# Patient Record
Sex: MALE | Race: WHITE | Employment: OTHER | URBAN - METROPOLITAN AREA
[De-identification: names, ages, dates, MRNs, and addresses within clinical notes are randomized per-mention and may not be internally consistent; named-entity substitution may affect disease eponyms.]

---

## 2024-01-15 ENCOUNTER — TRANSITIONAL CARE UNIT VISIT (OUTPATIENT)
Dept: GERIATRICS | Facility: CLINIC | Age: 74
End: 2024-01-15
Payer: COMMERCIAL

## 2024-01-15 ENCOUNTER — LAB REQUISITION (OUTPATIENT)
Dept: LAB | Facility: CLINIC | Age: 74
End: 2024-01-15
Payer: COMMERCIAL

## 2024-01-15 ENCOUNTER — DOCUMENTATION ONLY (OUTPATIENT)
Dept: GERIATRICS | Facility: CLINIC | Age: 74
End: 2024-01-15

## 2024-01-15 VITALS
HEART RATE: 81 BPM | BODY MASS INDEX: 24.74 KG/M2 | DIASTOLIC BLOOD PRESSURE: 79 MMHG | RESPIRATION RATE: 20 BRPM | OXYGEN SATURATION: 100 % | WEIGHT: 172.8 LBS | HEIGHT: 70 IN | TEMPERATURE: 97.6 F | SYSTOLIC BLOOD PRESSURE: 121 MMHG

## 2024-01-15 DIAGNOSIS — N18.32 STAGE 3B CHRONIC KIDNEY DISEASE (H): ICD-10-CM

## 2024-01-15 DIAGNOSIS — E11.00 TYPE 2 DIABETES MELLITUS WITH HYPEROSMOLARITY WITHOUT COMA, UNSPECIFIED WHETHER LONG TERM INSULIN USE (H): ICD-10-CM

## 2024-01-15 DIAGNOSIS — N18.32 CHRONIC KIDNEY DISEASE, STAGE 3B (H): ICD-10-CM

## 2024-01-15 DIAGNOSIS — I25.118 CORONARY ARTERY DISEASE OF NATIVE ARTERY OF NATIVE HEART WITH STABLE ANGINA PECTORIS (H): ICD-10-CM

## 2024-01-15 DIAGNOSIS — E78.2 MIXED DYSLIPIDEMIA: ICD-10-CM

## 2024-01-15 DIAGNOSIS — I63.531 ACUTE ISCHEMIC RIGHT POSTERIOR CEREBRAL ARTERY (PCA) STROKE (H): Primary | ICD-10-CM

## 2024-01-15 DIAGNOSIS — I69.354 HEMIPARESIS AFFECTING LEFT SIDE AS LATE EFFECT OF CEREBROVASCULAR ACCIDENT (CVA) (H): ICD-10-CM

## 2024-01-15 PROBLEM — B35.1 ONYCHOMYCOSIS OF RIGHT GREAT TOE: Status: ACTIVE | Noted: 2023-04-05

## 2024-01-15 PROBLEM — E65 VISCERAL OBESITY: Status: ACTIVE | Noted: 2020-11-13

## 2024-01-15 PROBLEM — E11.9 TYPE 2 DIABETES MELLITUS (H): Status: ACTIVE | Noted: 2017-02-16

## 2024-01-15 PROBLEM — I63.532 CEREBROVASCULAR ACCIDENT (CVA) DUE TO OCCLUSION OF LEFT POSTERIOR CEREBRAL ARTERY (H): Status: ACTIVE | Noted: 2022-07-04

## 2024-01-15 PROBLEM — R94.39 ABNORMAL STRESS TEST: Status: ACTIVE | Noted: 2024-01-04

## 2024-01-15 PROBLEM — K76.0 NAFLD (NONALCOHOLIC FATTY LIVER DISEASE): Status: ACTIVE | Noted: 2019-10-09

## 2024-01-15 PROBLEM — E78.41 ELEVATED LIPOPROTEIN(A): Chronic | Status: ACTIVE | Noted: 2022-04-06

## 2024-01-15 PROBLEM — H93.13 TINNITUS, BILATERAL: Status: ACTIVE | Noted: 2022-04-04

## 2024-01-15 PROCEDURE — 99305 1ST NF CARE MODERATE MDM 35: CPT | Performed by: NURSE PRACTITIONER

## 2024-01-15 RX ORDER — ASPIRIN 81 MG/1
81 TABLET ORAL DAILY
COMMUNITY
Start: 2024-01-15

## 2024-01-15 RX ORDER — NITROGLYCERIN 0.4 MG/1
0.4 TABLET SUBLINGUAL EVERY 5 MIN PRN
COMMUNITY
Start: 2024-01-15 | End: 2024-03-19

## 2024-01-15 RX ORDER — CLOPIDOGREL BISULFATE 75 MG/1
75 TABLET ORAL DAILY
COMMUNITY
Start: 2024-01-15

## 2024-01-15 RX ORDER — ISOSORBIDE DINITRATE 5 MG/1
5 TABLET ORAL
COMMUNITY
Start: 2024-01-15 | End: 2024-03-07

## 2024-01-15 RX ORDER — ATORVASTATIN CALCIUM 80 MG/1
80 TABLET, FILM COATED ORAL AT BEDTIME
COMMUNITY
Start: 2024-01-15

## 2024-01-15 RX ORDER — METOPROLOL TARTRATE 25 MG/1
25 TABLET, FILM COATED ORAL 2 TIMES DAILY
COMMUNITY
Start: 2024-01-15 | End: 2024-03-17

## 2024-01-15 RX ORDER — MIRTAZAPINE 15 MG/1
15 TABLET, FILM COATED ORAL AT BEDTIME
COMMUNITY
Start: 2024-01-15 | End: 2024-02-07

## 2024-01-15 NOTE — PROGRESS NOTES
[facility] GERIATRIC SERVICES    Code Status:  DNR   Visit Type:   Chief Complaint   Patient presents with    TCU Admission     [place] 1/4/2024 - 1/12/2024     Facility:  [facility] ([facility]) [[id number]]           HPI: Shaji Markham is a 73 year old male who I am seeing today for admit to the TCU. 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  Patient hospitalized on 1/4/2024 due to left-sided weakness and right gaze following cardiac catheterization.  Patient underwent a coronary angiogram on 1/4/2024 in the Cath Lab at [facility].  He received a heparin bolus.  After the procedure was done staff noted the patient exhibiting right gaze deviation.  He also endorsed left Heema body numbness and weakness he was transported to the ED accompanied by the neurologist.  In the ED tPA not indicated as patient had already received heparin.  CT was not indicated of LVO so no endovascular treatment was pursued.  Patient given aspirin suppository.  MRI of the brain showed restricted diffusion defect in the right hippocampal body and tail likely representing acute ischemia.  Repeat MRI of the brain 1/5/2024 demonstrated increased size of the recent right PCA territory infarct involving the hippocampus and occipital lobe with mild localized mass effect.  Possible trace SAH overlying the right middle lobe.  EEG negative.  Vascular surgery consulted for carotid disease.  Severe greater than 70% stenosis of bilateral carotid arteries.  Vascular surgery recommended aspirin with possible carotid artery revascularization in the future.  DAPT and statin therapy.  Vascular recommended 3-month follow-up with repeat carotid artery ultrasound.  Cardiothoracic surgery following.  Patient underwent coronary angiogram on 1/4/2024 which showed multivessel CAD including 70% proximal LAD  disease.  CT surgery recommended CABG versus complex percutaneous coronary intervention.  Neurology recommended at least 4 weeks before considering any intervention.  Patient with suicidal thoughts and ideation during hospitalization.  Psychiatric consulted.  Patient found to have a general disorder with depressed mood.  Worsened left-sided weakness on 1/9/2024.  Repeat head CT showed evolving stroke.  Patient with dense left hemiplegia.    Transitional Care Course: Today patient lying in bed.  Patient with left-sided Heema plegia.  Not sure patient is exhibiting expressive aphasia or cognitive impairment.  He does not always follow commands.  He reports dislike of the food.  He is incontinent of bowel and bladder.  Denies shortness of breath or chest pain.      Assessment/Plan:     Acute ischemic right posterior cerebral artery (PCA) stroke   Hemiparesis affecting left side as late effect of cerebrovascular accident (CVA)   History of CVA due to occlusion of the left posterior cerebral artery  -Continue Plavix, aspirin and statin.  -Brain MRI showed right PCA stroke.  -Video EEG with no seizure.    Bilateral carotid stenosis  -Vascular surgery consulted for greater than 70% stenosis of bilateral carotid arteries.    -Recommend follow-up in 3 months for repeat ultrasound and possible surgery.  -Continue aspirin, Plavix and statin.    Coronary artery disease of native artery of native heart with stable angina pectoris (H24)  -Status post coronary angiogram on 1/4/2024 revealing multivessel coronary artery disease.  -CV surgery following.  Initially CABG was recommended now cardiology is planning possible PCI.   -Hold off for 4 weeks per neurology recommendations  -Continue aspirin and statin.  -Echocardiogram showed ejection fracture of 59%.  -Continue Isosorbide.     Type 2 diabetes mellitus with hyperosmolarity without coma, unspecified whether long term insulin use (H)  -Consistent carb diet.  -Hemoglobin A1c 6.5%  on 1/4/2024.  -Previously on metformin, Farxiga and Ozempic.  Discontinued during hospitalization.  -Blood sugar checks twice daily.  Monitor for hypoglycemia due to poor oral intake.    -looks like sliding scale should have been discontinued but pt admitted on sliding scale with meals. Discontinue today.  Pain management and reach somebody downstairs but they are not answering  -Offer at bedtime snack.    Stage 3b chronic kidney disease (H)  -Baseline creatinine 1.20-1.7.  -Follow-up BMP.    Mixed dyslipidemia  -Continue statin.    -Okay for PT OT eval and treat.  Follow-up CBC and BMP.    Active Ambulatory Problems     Diagnosis Date Noted    Abnormal stress test 01/04/2024    Acute ischemic right posterior cerebral artery (PCA) stroke (H) 01/04/2024    Cerebrovascular accident (CVA) due to occlusion of left posterior cerebral artery (H) 07/04/2022    Type 2 diabetes mellitus (H) 02/16/2017    Tinnitus, bilateral 04/04/2022    Stage 3b chronic kidney disease (H) 03/13/2021    Onychomycosis of right great toe 04/05/2023    NAFLD (nonalcoholic fatty liver disease) 10/09/2019    Mixed dyslipidemia 05/05/2011    Elevated lipoprotein(a) 04/06/2022    Coronary artery disease of native artery of native heart with stable angina pectoris (H24) 01/04/2024    Visceral obesity 11/13/2020     Resolved Ambulatory Problems     Diagnosis Date Noted    No Resolved Ambulatory Problems     No Additional Past Medical History     Allergies   Allergen Reactions    Latex Hives and Rash    Anesthetics, Lorna Rash       All Meds and Allergies reviewed in the record at the facility and is the most up-to-date.    Post Discharge Medication Reconciliation Status: discharge medications reconciled, continue medications without change  Current Outpatient Medications   Medication Sig    aspirin 81 MG EC tablet Take 81 mg by mouth daily    atorvastatin (LIPITOR) 80 MG tablet Take 80 mg by mouth at bedtime    clopidogrel (PLAVIX) 75 MG tablet Take  "75 mg by mouth daily    insulin aspart (NOVOLOG PEN) 100 UNIT/ML pen Inject Subcutaneous 3 times daily (with meals) Injection per sliding scale:  If Blood Sugar is 150 to 199, give 2 Units.  If Blood Sugar is 200 to 249, give 4 Units.  If Blood Sugar is 250 to 299, give 6 Units.  If Blood Sugar is 300 to 349, give 8 Units.  If Blood Sugar is 350 to 399, give 10 Units.  If Blood Sugar is greater than 399, give 12 Units.  If Blood Sugar is greater than 399, call NP/PA.    isosorbide dinitrate (ISORDIL) 5 MG tablet Take 5 mg by mouth 3 times daily    metoprolol tartrate (LOPRESSOR) 25 MG tablet Take 25 mg by mouth 2 times daily    mirtazapine (REMERON) 15 MG tablet Take 15 mg by mouth at bedtime    nitroGLYcerin (NITROSTAT) 0.4 MG sublingual tablet Place 0.4 mg under the tongue every 5 minutes as needed for chest pain For chest pain place 1 tablet under the tongue every 5 minutes for 3 doses. If symptoms persist 5 minutes after 1st dose call 911.    Suvorexant (BELSOMRA) 10 MG tablet Take 10 mg by mouth nightly as needed for sleep     No current facility-administered medications for this visit.       REVIEW OF SYSTEMS:   10 point review of systems reviewed and pertinent positives in the HPI.     PHYSICAL EXAMINATION:  Physical Exam     Vital signs: /79   Pulse 81   Temp 97.6  F (36.4  C)   Resp 20   Ht 1.778 m (5' 10\")   Wt 78.4 kg (172 lb 12.8 oz)   SpO2 100%   BMI 24.79 kg/m    General: Awake, Alert, oriented x2, lying in bed, follows simple commands at times   HEENT:Pink conjunctiva, moist oral mucosa, left side facial droop. Speech clear.   NECK: Supple  CVS:  S1  S2, without murmur or gallop.   LUNG: Clear to auscultation, No wheezes, rales or rhonci. Shallow respiratory effort.   BACK: No kyphosis of the thoracic spine  ABDOMEN: Soft, nontender to palpation, with positive bowel sounds  EXTREMITIES: Left side hemiparesis, no pedal edema  SKIN: Warm and dry  NEUROLOGIC: See above, pulses " palpable      Labs:  All labs reviewed in the nursing home record and Epic     45 minutes spent for this visit which included reviewing labs, meds, consults, imaging as well as face to face with pt and collaboration with nursing staff.     This note has been dictated using voice recognition software. Any grammatical or context distortions are unintentional and inherent to the software    Electronically signed by: Essence Conteh CNP

## 2024-01-15 NOTE — LETTER
1/15/2024        RE: Shaji Markham  [address]  [place] [facility] [address]        [facility]ERIAT[facility]    Code Status:  DNR   Visit Type:   Chief Complaint   Patient presents with     TCU Admission     [place] 1/4/2024 - 1/12/2024     Facility:  [facility] ([facility]) [[id number]]           HPI: Shaji Markham is a 73 year old male who I am seeing today for admit to the TCU. 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  Patient hospitalized on 1/4/2024 due to left-sided weakness and right gaze following cardiac catheterization.  Patient underwent a coronary angiogram on 1/4/2024 in the Cath Lab at [facility].  He received a heparin bolus.  After the procedure was done staff noted the patient exhibiting right gaze deviation.  He also endorsed left Heema body numbness and weakness he was transported to the ED accompanied by the neurologist.  In the ED tPA not indicated as patient had already received heparin.  CT was not indicated of LVO so no endovascular treatment was pursued.  Patient given aspirin suppository.  MRI of the brain showed restricted diffusion defect in the right hippocampal body and tail likely representing acute ischemia.  Repeat MRI of the brain 1/5/2024 demonstrated increased size of the recent right PCA territory infarct involving the hippocampus and occipital lobe with mild localized mass effect.  Possible trace SAH overlying the right middle lobe.  EEG negative.  Vascular surgery consulted for carotid disease.  Severe greater than 70% stenosis of bilateral carotid arteries.  Vascular surgery recommended aspirin with possible carotid artery revascularization in the future.  DAPT and statin therapy.  Vascular recommended 3-month follow-up with repeat carotid artery ultrasound.  Cardiothoracic surgery following.  Patient underwent  coronary angiogram on 1/4/2024 which showed multivessel CAD including 70% proximal LAD disease.  CT surgery recommended CABG versus complex percutaneous coronary intervention.  Neurology recommended at least 4 weeks before considering any intervention.  Patient with suicidal thoughts and ideation during hospitalization.  Psychiatric consulted.  Patient found to have a general disorder with depressed mood.  Worsened left-sided weakness on 1/9/2024.  Repeat head CT showed evolving stroke.  Patient with dense left hemiplegia.    Transitional Care Course: Today patient lying in bed.  Patient with left-sided Heema plegia.  Not sure patient is exhibiting expressive aphasia or cognitive impairment.  He does not always follow commands.  He reports dislike of the food.  He is incontinent of bowel and bladder.  Denies shortness of breath or chest pain.      Assessment/Plan:     Acute ischemic right posterior cerebral artery (PCA) stroke   Hemiparesis affecting left side as late effect of cerebrovascular accident (CVA)   History of CVA due to occlusion of the left posterior cerebral artery  -Continue Plavix, aspirin and statin.  -Brain MRI showed right PCA stroke.  -Video EEG with no seizure.    Bilateral carotid stenosis  -Vascular surgery consulted for greater than 70% stenosis of bilateral carotid arteries.    -Recommend follow-up in 3 months for repeat ultrasound and possible surgery.  -Continue aspirin, Plavix and statin.    Coronary artery disease of native artery of native heart with stable angina pectoris (H24)  -Status post coronary angiogram on 1/4/2024 revealing multivessel coronary artery disease.  -CV surgery following.  Initially CABG was recommended now cardiology is planning possible PCI.   -Hold off for 4 weeks per neurology recommendations  -Continue aspirin and statin.  -Echocardiogram showed ejection fracture of 59%.  -Continue Isosorbide.     Type 2 diabetes mellitus with hyperosmolarity without coma,  unspecified whether long term insulin use (H)  -Consistent carb diet.  -Hemoglobin A1c 6.5% on 1/4/2024.  -Previously on metformin, Farxiga and Ozempic.  Discontinued during hospitalization.  -Blood sugar checks twice daily.  Monitor for hypoglycemia due to poor oral intake.    -looks like sliding scale should have been discontinued but pt admitted on sliding scale with meals. Discontinue today.  Pain management and reach somebody downstairs but they are not answering  -Offer at bedtime snack.    Stage 3b chronic kidney disease (H)  -Baseline creatinine 1.20-1.7.  -Follow-up BMP.    Mixed dyslipidemia  -Continue statin.    -Okay for PT OT eval and treat.  Follow-up CBC and BMP.    Active Ambulatory Problems     Diagnosis Date Noted     Abnormal stress test 01/04/2024     Acute ischemic right posterior cerebral artery (PCA) stroke (H) 01/04/2024     Cerebrovascular accident (CVA) due to occlusion of left posterior cerebral artery (H) 07/04/2022     Type 2 diabetes mellitus (H) 02/16/2017     Tinnitus, bilateral 04/04/2022     Stage 3b chronic kidney disease (H) 03/13/2021     Onychomycosis of right great toe 04/05/2023     NAFLD (nonalcoholic fatty liver disease) 10/09/2019     Mixed dyslipidemia 05/05/2011     Elevated lipoprotein(a) 04/06/2022     Coronary artery disease of native artery of native heart with stable angina pectoris (H24) 01/04/2024     Visceral obesity 11/13/2020     Resolved Ambulatory Problems     Diagnosis Date Noted     No Resolved Ambulatory Problems     No Additional Past Medical History     Allergies   Allergen Reactions     Latex Hives and Rash     Anesthetics, Lorna Rash       All Meds and Allergies reviewed in the record at the facility and is the most up-to-date.    Post Discharge Medication Reconciliation Status: discharge medications reconciled, continue medications without change  Current Outpatient Medications   Medication Sig     aspirin 81 MG EC tablet Take 81 mg by mouth daily      "atorvastatin (LIPITOR) 80 MG tablet Take 80 mg by mouth at bedtime     clopidogrel (PLAVIX) 75 MG tablet Take 75 mg by mouth daily     insulin aspart (NOVOLOG PEN) 100 UNIT/ML pen Inject Subcutaneous 3 times daily (with meals) Injection per sliding scale:  If Blood Sugar is 150 to 199, give 2 Units.  If Blood Sugar is 200 to 249, give 4 Units.  If Blood Sugar is 250 to 299, give 6 Units.  If Blood Sugar is 300 to 349, give 8 Units.  If Blood Sugar is 350 to 399, give 10 Units.  If Blood Sugar is greater than 399, give 12 Units.  If Blood Sugar is greater than 399, call NP/PA.     isosorbide dinitrate (ISORDIL) 5 MG tablet Take 5 mg by mouth 3 times daily     metoprolol tartrate (LOPRESSOR) 25 MG tablet Take 25 mg by mouth 2 times daily     mirtazapine (REMERON) 15 MG tablet Take 15 mg by mouth at bedtime     nitroGLYcerin (NITROSTAT) 0.4 MG sublingual tablet Place 0.4 mg under the tongue every 5 minutes as needed for chest pain For chest pain place 1 tablet under the tongue every 5 minutes for 3 doses. If symptoms persist 5 minutes after 1st dose call 911.     Suvorexant (BELSOMRA) 10 MG tablet Take 10 mg by mouth nightly as needed for sleep     No current facility-administered medications for this visit.       REVIEW OF SYSTEMS:   10 point review of systems reviewed and pertinent positives in the HPI.     PHYSICAL EXAMINATION:  Physical Exam     Vital signs: /79   Pulse 81   Temp 97.6  F (36.4  C)   Resp 20   Ht 1.778 m (5' 10\")   Wt 78.4 kg (172 lb 12.8 oz)   SpO2 100%   BMI 24.79 kg/m    General: Awake, Alert, oriented x2, lying in bed, follows simple commands at times   HEENT:Pink conjunctiva, moist oral mucosa, left side facial droop. Speech clear.   NECK: Supple  CVS:  S1  S2, without murmur or gallop.   LUNG: Clear to auscultation, No wheezes, rales or rhonci. Shallow respiratory effort.   BACK: No kyphosis of the thoracic spine  ABDOMEN: Soft, nontender to palpation, with positive bowel " sounds  EXTREMITIES: Left side hemiparesis, no pedal edema  SKIN: Warm and dry  NEUROLOGIC: See above, pulses palpable      Labs:  All labs reviewed in the nursing home record and Epic     45 minutes spent for this visit which included reviewing labs, meds, consults, imaging as well as face to face with pt and collaboration with nursing staff.     This note has been dictated using voice recognition software. Any grammatical or context distortions are unintentional and inherent to the software    Electronically signed by: Essence Conteh CNP       Sincerely,        Essence Conteh NP

## 2024-01-16 DIAGNOSIS — G47.9 SLEEP DISORDER: Primary | ICD-10-CM

## 2024-01-16 LAB
ANION GAP SERPL CALCULATED.3IONS-SCNC: 12 MMOL/L (ref 7–15)
BUN SERPL-MCNC: 33.7 MG/DL (ref 8–23)
CALCIUM SERPL-MCNC: 10.1 MG/DL (ref 8.8–10.2)
CHLORIDE SERPL-SCNC: 107 MMOL/L (ref 98–107)
CREAT SERPL-MCNC: 1.88 MG/DL (ref 0.67–1.17)
DEPRECATED HCO3 PLAS-SCNC: 24 MMOL/L (ref 22–29)
EGFRCR SERPLBLD CKD-EPI 2021: 37 ML/MIN/1.73M2
ERYTHROCYTE [DISTWIDTH] IN BLOOD BY AUTOMATED COUNT: 12.7 % (ref 10–15)
GLUCOSE SERPL-MCNC: 108 MG/DL (ref 70–99)
HCT VFR BLD AUTO: 44.4 % (ref 40–53)
HGB BLD-MCNC: 14.6 G/DL (ref 13.3–17.7)
MCH RBC QN AUTO: 30.8 PG (ref 26.5–33)
MCHC RBC AUTO-ENTMCNC: 32.9 G/DL (ref 31.5–36.5)
MCV RBC AUTO: 94 FL (ref 78–100)
PLATELET # BLD AUTO: 285 10E3/UL (ref 150–450)
POTASSIUM SERPL-SCNC: 4.5 MMOL/L (ref 3.4–5.3)
RBC # BLD AUTO: 4.74 10E6/UL (ref 4.4–5.9)
SODIUM SERPL-SCNC: 143 MMOL/L (ref 135–145)
WBC # BLD AUTO: 7.9 10E3/UL (ref 4–11)

## 2024-01-16 PROCEDURE — 85027 COMPLETE CBC AUTOMATED: CPT | Mod: ORL | Performed by: NURSE PRACTITIONER

## 2024-01-16 PROCEDURE — 36415 COLL VENOUS BLD VENIPUNCTURE: CPT | Mod: ORL | Performed by: NURSE PRACTITIONER

## 2024-01-16 PROCEDURE — P9603 ONE-WAY ALLOW PRORATED MILES: HCPCS | Mod: ORL | Performed by: NURSE PRACTITIONER

## 2024-01-16 PROCEDURE — 80048 BASIC METABOLIC PNL TOTAL CA: CPT | Mod: ORL | Performed by: NURSE PRACTITIONER

## 2024-01-17 ENCOUNTER — TRANSITIONAL CARE UNIT VISIT (OUTPATIENT)
Dept: GERIATRICS | Facility: CLINIC | Age: 74
End: 2024-01-17
Payer: COMMERCIAL

## 2024-01-17 ENCOUNTER — LAB REQUISITION (OUTPATIENT)
Dept: LAB | Facility: CLINIC | Age: 74
End: 2024-01-17
Payer: COMMERCIAL

## 2024-01-17 VITALS
RESPIRATION RATE: 18 BRPM | HEART RATE: 68 BPM | BODY MASS INDEX: 24.74 KG/M2 | TEMPERATURE: 98.3 F | SYSTOLIC BLOOD PRESSURE: 142 MMHG | OXYGEN SATURATION: 96 % | DIASTOLIC BLOOD PRESSURE: 60 MMHG | HEIGHT: 70 IN | WEIGHT: 172.8 LBS

## 2024-01-17 DIAGNOSIS — E11.00 TYPE 2 DIABETES MELLITUS WITH HYPEROSMOLARITY WITHOUT COMA, UNSPECIFIED WHETHER LONG TERM INSULIN USE (H): ICD-10-CM

## 2024-01-17 DIAGNOSIS — I63.531 ACUTE ISCHEMIC RIGHT POSTERIOR CEREBRAL ARTERY (PCA) STROKE (H): Primary | ICD-10-CM

## 2024-01-17 DIAGNOSIS — G47.9 SLEEP DISORDER: ICD-10-CM

## 2024-01-17 DIAGNOSIS — N18.32 STAGE 3B CHRONIC KIDNEY DISEASE (H): ICD-10-CM

## 2024-01-17 DIAGNOSIS — E86.0 DEHYDRATION: ICD-10-CM

## 2024-01-17 DIAGNOSIS — N17.9 ACUTE KIDNEY FAILURE, UNSPECIFIED (H): ICD-10-CM

## 2024-01-17 DIAGNOSIS — E78.2 MIXED DYSLIPIDEMIA: ICD-10-CM

## 2024-01-17 DIAGNOSIS — I25.118 CORONARY ARTERY DISEASE OF NATIVE ARTERY OF NATIVE HEART WITH STABLE ANGINA PECTORIS (H): ICD-10-CM

## 2024-01-17 PROBLEM — K05.30 PERIODONTITIS: Chronic | Status: ACTIVE | Noted: 2023-12-06

## 2024-01-17 PROCEDURE — 99309 SBSQ NF CARE MODERATE MDM 30: CPT | Performed by: NURSE PRACTITIONER

## 2024-01-17 NOTE — LETTER
1/17/2024        RE: Shaji Markham  [address]  [facility] [address]        No notes on file      Sincerely,        Essence Conteh NP

## 2024-01-18 ENCOUNTER — TRANSITIONAL CARE UNIT VISIT (OUTPATIENT)
Dept: GERIATRICS | Facility: CLINIC | Age: 74
End: 2024-01-18
Payer: COMMERCIAL

## 2024-01-18 VITALS
HEART RATE: 57 BPM | WEIGHT: 171 LBS | OXYGEN SATURATION: 94 % | HEIGHT: 70 IN | TEMPERATURE: 97.1 F | DIASTOLIC BLOOD PRESSURE: 71 MMHG | SYSTOLIC BLOOD PRESSURE: 127 MMHG | BODY MASS INDEX: 24.48 KG/M2 | RESPIRATION RATE: 20 BRPM

## 2024-01-18 DIAGNOSIS — N17.9 ACUTE KIDNEY INJURY SUPERIMPOSED ON CKD (H): ICD-10-CM

## 2024-01-18 DIAGNOSIS — E11.00 TYPE 2 DIABETES MELLITUS WITH HYPEROSMOLARITY WITHOUT COMA, UNSPECIFIED WHETHER LONG TERM INSULIN USE (H): ICD-10-CM

## 2024-01-18 DIAGNOSIS — F32.0 CURRENT MILD EPISODE OF MAJOR DEPRESSIVE DISORDER, UNSPECIFIED WHETHER RECURRENT (H): ICD-10-CM

## 2024-01-18 DIAGNOSIS — N18.9 ACUTE KIDNEY INJURY SUPERIMPOSED ON CKD (H): ICD-10-CM

## 2024-01-18 DIAGNOSIS — I63.531 ACUTE ISCHEMIC RIGHT POSTERIOR CEREBRAL ARTERY (PCA) STROKE (H): Primary | ICD-10-CM

## 2024-01-18 DIAGNOSIS — I25.118 CORONARY ARTERY DISEASE OF NATIVE ARTERY OF NATIVE HEART WITH STABLE ANGINA PECTORIS (H): ICD-10-CM

## 2024-01-18 DIAGNOSIS — E78.2 MIXED DYSLIPIDEMIA: ICD-10-CM

## 2024-01-18 PROCEDURE — 80048 BASIC METABOLIC PNL TOTAL CA: CPT | Mod: ORL | Performed by: NURSE PRACTITIONER

## 2024-01-18 PROCEDURE — 36415 COLL VENOUS BLD VENIPUNCTURE: CPT | Mod: ORL | Performed by: NURSE PRACTITIONER

## 2024-01-18 PROCEDURE — 99309 SBSQ NF CARE MODERATE MDM 30: CPT | Performed by: NURSE PRACTITIONER

## 2024-01-18 PROCEDURE — P9604 ONE-WAY ALLOW PRORATED TRIP: HCPCS | Mod: ORL | Performed by: NURSE PRACTITIONER

## 2024-01-18 NOTE — LETTER
1/18/2024        RE: Shaji Markham  [address] [facility] [address]        [facility] GERIATRIC SERVICES    Code Status:  DNR   Visit Type:   Chief Complaint   Patient presents with     TCU Follow Up     Facility:  [facility]) [[id number]]           HPI: Shaji Markham is a 73 year old male who I am seeing today for follow up on the TCU. 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  Patient hospitalized on 1/4/2024 due to left-sided weakness and right gaze following cardiac catheterization.  Patient underwent a coronary angiogram on 1/4/2024 in the Cath Lab at [facility].  He received a heparin bolus.  After the procedure was done staff noted the patient exhibiting right gaze deviation.  He also endorsed left Heema body numbness and weakness he was transported to the ED accompanied by the neurologist.  In the ED tPA not indicated as patient had already received heparin.  CT was not indicated of LVO so no endovascular treatment was pursued.  Patient given aspirin suppository.  MRI of the brain showed restricted diffusion defect in the right hippocampal body and tail likely representing acute ischemia.  Repeat MRI of the brain 1/5/2024 demonstrated increased size of the recent right PCA territory infarct involving the hippocampus and occipital lobe with mild localized mass effect.  Possible trace SAH overlying the right middle lobe.  EEG negative.  Vascular surgery consulted for carotid disease.  Severe greater than 70% stenosis of bilateral carotid arteries.  Vascular surgery recommended aspirin with possible carotid artery revascularization in the future.  DAPT and statin therapy.  Vascular recommended 3-month follow-up with repeat carotid artery ultrasound.  Cardiothoracic surgery following.  Patient underwent coronary angiogram on 1/4/2024  which showed multivessel CAD including 70% proximal LAD disease.  CT surgery recommended CABG versus complex percutaneous coronary intervention.  Neurology recommended at least 4 weeks before considering any intervention.  Patient with suicidal thoughts and ideation during hospitalization.  Psychiatric consulted.  Patient found to have a general disorder with depressed mood.  Worsened left-sided weakness on 1/9/2024.  Repeat head CT showed evolving stroke.  Patient with dense left hemiplegia.    Transitional Care Course: Today patient lying in bed. CVA with left-sided Heema plegia. Pt with poor oral intake. He is being fed but often refuses meal. I did speak with his daughter today in regards to poor oral intake and dehydration. He was on pureed diet in hospital and she feels he would do better with that. He is currently on moist and minced diet. He is being followed by speech therapy. Dehydration. Discussed IV fluids and she is in agreement if needed. Depressive mood. He continues on Remeron. Incontinent of both bowel and bladder. Pt denies any pain. No SOB or CP.     Assessment/Plan:     Acute ischemic right posterior cerebral artery (PCA) stroke   Hemiparesis affecting left side as late effect of cerebrovascular accident (CVA)   History of CVA due to occlusion of the left posterior cerebral artery  -Continue Plavix, aspirin and statin.  -Brain MRI showed right PCA stroke.  -Video EEG with no seizure.    Bilateral carotid stenosis  -Vascular surgery consulted for greater than 70% stenosis of bilateral carotid arteries.    -Recommend follow-up in 3 months for repeat ultrasound and possible surgery.  -Continue aspirin, Plavix and statin.    DANIA on CKD, stage 3.   -Baseline creatinine 1.20-1.7.  -Creatine now 1.88 due to poor oral intake.   -Previous Creatine 1.88. BMP pending today.   -Additional 240 ml with each med pass.   -assist with all meals.     Coronary artery disease of native artery of native heart with  stable angina pectoris (H24)  -Status post coronary angiogram on 1/4/2024 revealing multivessel coronary artery disease.  -CV surgery following.  Initially CABG was recommended now cardiology is planning possible PCI.   -Hold off for 4 weeks per neurology recommendations  -Continue aspirin and statin.  -Echocardiogram showed ejection fracture of 59%.  -Continue Isosorbide.     Type 2 diabetes mellitus with hyperosmolarity without coma, unspecified whether long term insulin use (H)  -Consistent carb diet.  -Hemoglobin A1c 6.5% on 1/4/2024.  -Previously on metformin, Farxiga and Ozempic.  Discontinued during hospitalization.  -Blood sugar checks twice daily.  Monitor for hypoglycemia due to poor oral intake.    -looks like sliding scale should have been discontinued but pt admitted on sliding scale with meals. Discontinue today.  Pain management and reach somebody downstairs but they are not answering  -Offer at bedtime snack.    Mixed dyslipidemia  -Continue statin.    Depression   -Continue Remeron 15 mg at bedtime.   -pt with suicidal ideation in hospital.   -Monitor for symptoms.         Active Ambulatory Problems     Diagnosis Date Noted     Abnormal stress test 01/04/2024     Acute ischemic right posterior cerebral artery (PCA) stroke (H) 01/04/2024     Cerebrovascular accident (CVA) due to occlusion of left posterior cerebral artery (H) 07/04/2022     Type 2 diabetes mellitus (H) 02/16/2017     Tinnitus, bilateral 04/04/2022     Stage 3b chronic kidney disease (H) 03/13/2021     Onychomycosis of right great toe 04/05/2023     NAFLD (nonalcoholic fatty liver disease) 10/09/2019     Mixed dyslipidemia 05/05/2011     Elevated lipoprotein(a) 04/06/2022     Coronary artery disease of native artery of native heart with stable angina pectoris (H24) 01/04/2024     Visceral obesity 11/13/2020     Periodontitis 12/06/2023     Resolved Ambulatory Problems     Diagnosis Date Noted     No Resolved Ambulatory Problems     No  "Additional Past Medical History     Allergies   Allergen Reactions     Latex Hives and Rash     Anesthetics, Lorna Rash       All Meds and Allergies reviewed in the record at the facility and is the most up-to-date.    Current Outpatient Medications   Medication Sig     aspirin 81 MG EC tablet Take 81 mg by mouth daily     atorvastatin (LIPITOR) 80 MG tablet Take 80 mg by mouth at bedtime     clopidogrel (PLAVIX) 75 MG tablet Take 75 mg by mouth daily     isosorbide dinitrate (ISORDIL) 5 MG tablet Take 5 mg by mouth 3 times daily     metoprolol tartrate (LOPRESSOR) 25 MG tablet Take 25 mg by mouth 2 times daily     mirtazapine (REMERON) 15 MG tablet Take 15 mg by mouth at bedtime     nitroGLYcerin (NITROSTAT) 0.4 MG sublingual tablet Place 0.4 mg under the tongue every 5 minutes as needed for chest pain For chest pain place 1 tablet under the tongue every 5 minutes for 3 doses. If symptoms persist 5 minutes after 1st dose call 911.     Suvorexant (BELSOMRA) 10 MG tablet Take 10 mg by mouth nightly as needed for sleep     No current facility-administered medications for this visit.       REVIEW OF SYSTEMS:   10 point review of systems reviewed and pertinent positives in the HPI.     PHYSICAL EXAMINATION:  Physical Exam     Vital signs: /71   Pulse 57   Temp 97.1  F (36.2  C)   Resp 20   Ht 1.778 m (5' 10\")   Wt 77.6 kg (171 lb)   SpO2 94%   BMI 24.54 kg/m    General: Awake, Alert, oriented x2, lying in bed, follows simple commands at times   HEENT:Pink conjunctiva, dry oral mucosa, left side facial droop. Speech clear.   NECK: Supple  CVS:  S1  S2, without murmur or gallop.   LUNG: Clear to auscultation, No wheezes, rales or rhonci. Shallow respiratory effort.   BACK: No kyphosis of the thoracic spine  ABDOMEN: Soft, nontender to palpation, with positive bowel sounds  EXTREMITIES: Left side hemiparesis, no pedal edema  SKIN: Warm and dry  NEUROLOGIC: See above, pulses palpable      Labs:  All labs " reviewed in the nursing home record and Epic       This note has been dictated using voice recognition software. Any grammatical or context distortions are unintentional and inherent to the software    Electronically signed by: Essence Conteh CNP       Sincerely,        Essence Conteh, NP

## 2024-01-18 NOTE — PROGRESS NOTES
[facility] GERIATRIC SERVICES    Code Status:  DNR   Visit Type:   Chief Complaint   Patient presents with    TCU Follow Up     Facility:  [facility] ([facility]) [[id number]]           HPI: Shaji Markham is a 73 year old male who I am seeing today for follow up on the TCU. 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  Patient hospitalized on 1/4/2024 due to left-sided weakness and right gaze following cardiac catheterization.  Patient underwent a coronary angiogram on 1/4/2024 in the Cath Lab at [facility].  He received a heparin bolus.  After the procedure was done staff noted the patient exhibiting right gaze deviation.  He also endorsed left Heema body numbness and weakness he was transported to the ED accompanied by the neurologist.  In the ED tPA not indicated as patient had already received heparin.  CT was not indicated of LVO so no endovascular treatment was pursued.  Patient given aspirin suppository.  MRI of the brain showed restricted diffusion defect in the right hippocampal body and tail likely representing acute ischemia.  Repeat MRI of the brain 1/5/2024 demonstrated increased size of the recent right PCA territory infarct involving the hippocampus and occipital lobe with mild localized mass effect.  Possible trace SAH overlying the right middle lobe.  EEG negative.  Vascular surgery consulted for carotid disease.  Severe greater than 70% stenosis of bilateral carotid arteries.  Vascular surgery recommended aspirin with possible carotid artery revascularization in the future.  DAPT and statin therapy.  Vascular recommended 3-month follow-up with repeat carotid artery ultrasound.  Cardiothoracic surgery following.  Patient underwent coronary angiogram on 1/4/2024 which showed multivessel CAD including 70% proximal LAD disease.  CT surgery  recommended CABG versus complex percutaneous coronary intervention.  Neurology recommended at least 4 weeks before considering any intervention.  Patient with suicidal thoughts and ideation during hospitalization.  Psychiatric consulted.  Patient found to have a general disorder with depressed mood.  Worsened left-sided weakness on 1/9/2024.  Repeat head CT showed evolving stroke.  Patient with dense left hemiplegia.    Transitional Care Course: Today patient lying in bed. CVA with left-sided Heema plegia. Pt with poor oral intake. He is being fed but often refuses meal. I did speak with his daughter today in regards to poor oral intake and dehydration. He was on pureed diet in hospital and she feels he would do better with that. He is currently on moist and minced diet. He is being followed by speech therapy. Dehydration. Discussed IV fluids and she is in agreement if needed. Depressive mood. He continues on Remeron. Incontinent of both bowel and bladder. Pt denies any pain. No SOB or CP.     Assessment/Plan:     Acute ischemic right posterior cerebral artery (PCA) stroke   Hemiparesis affecting left side as late effect of cerebrovascular accident (CVA)   History of CVA due to occlusion of the left posterior cerebral artery  -Continue Plavix, aspirin and statin.  -Brain MRI showed right PCA stroke.  -Video EEG with no seizure.    Bilateral carotid stenosis  -Vascular surgery consulted for greater than 70% stenosis of bilateral carotid arteries.    -Recommend follow-up in 3 months for repeat ultrasound and possible surgery.  -Continue aspirin, Plavix and statin.    DANIA on CKD, stage 3.   -Baseline creatinine 1.20-1.7.  -Creatine now 1.88 due to poor oral intake.   -Previous Creatine 1.88. BMP pending today.   -Additional 240 ml with each med pass.   -assist with all meals.     Coronary artery disease of native artery of native heart with stable angina pectoris (H24)  -Status post coronary angiogram on 1/4/2024  revealing multivessel coronary artery disease.  -CV surgery following.  Initially CABG was recommended now cardiology is planning possible PCI.   -Hold off for 4 weeks per neurology recommendations  -Continue aspirin and statin.  -Echocardiogram showed ejection fracture of 59%.  -Continue Isosorbide.     Type 2 diabetes mellitus with hyperosmolarity without coma, unspecified whether long term insulin use (H)  -Consistent carb diet.  -Hemoglobin A1c 6.5% on 1/4/2024.  -Previously on metformin, Farxiga and Ozempic.  Discontinued during hospitalization.  -Blood sugar checks twice daily.  Monitor for hypoglycemia due to poor oral intake.    -looks like sliding scale should have been discontinued but pt admitted on sliding scale with meals. Discontinue today.  Pain management and reach somebody downstairs but they are not answering  -Offer at bedtime snack.    Mixed dyslipidemia  -Continue statin.    Depression   -Continue Remeron 15 mg at bedtime.   -pt with suicidal ideation in hospital.   -Monitor for symptoms.         Active Ambulatory Problems     Diagnosis Date Noted    Abnormal stress test 01/04/2024    Acute ischemic right posterior cerebral artery (PCA) stroke (H) 01/04/2024    Cerebrovascular accident (CVA) due to occlusion of left posterior cerebral artery (H) 07/04/2022    Type 2 diabetes mellitus (H) 02/16/2017    Tinnitus, bilateral 04/04/2022    Stage 3b chronic kidney disease (H) 03/13/2021    Onychomycosis of right great toe 04/05/2023    NAFLD (nonalcoholic fatty liver disease) 10/09/2019    Mixed dyslipidemia 05/05/2011    Elevated lipoprotein(a) 04/06/2022    Coronary artery disease of native artery of native heart with stable angina pectoris (H24) 01/04/2024    Visceral obesity 11/13/2020    Periodontitis 12/06/2023     Resolved Ambulatory Problems     Diagnosis Date Noted    No Resolved Ambulatory Problems     No Additional Past Medical History     Allergies   Allergen Reactions    Latex Hives and  "Rash    Anesthetics, Lorna Rash       All Meds and Allergies reviewed in the record at the facility and is the most up-to-date.    Current Outpatient Medications   Medication Sig    aspirin 81 MG EC tablet Take 81 mg by mouth daily    atorvastatin (LIPITOR) 80 MG tablet Take 80 mg by mouth at bedtime    clopidogrel (PLAVIX) 75 MG tablet Take 75 mg by mouth daily    isosorbide dinitrate (ISORDIL) 5 MG tablet Take 5 mg by mouth 3 times daily    metoprolol tartrate (LOPRESSOR) 25 MG tablet Take 25 mg by mouth 2 times daily    mirtazapine (REMERON) 15 MG tablet Take 15 mg by mouth at bedtime    nitroGLYcerin (NITROSTAT) 0.4 MG sublingual tablet Place 0.4 mg under the tongue every 5 minutes as needed for chest pain For chest pain place 1 tablet under the tongue every 5 minutes for 3 doses. If symptoms persist 5 minutes after 1st dose call 911.    Suvorexant (BELSOMRA) 10 MG tablet Take 10 mg by mouth nightly as needed for sleep     No current facility-administered medications for this visit.       REVIEW OF SYSTEMS:   10 point review of systems reviewed and pertinent positives in the HPI.     PHYSICAL EXAMINATION:  Physical Exam     Vital signs: /71   Pulse 57   Temp 97.1  F (36.2  C)   Resp 20   Ht 1.778 m (5' 10\")   Wt 77.6 kg (171 lb)   SpO2 94%   BMI 24.54 kg/m    General: Awake, Alert, oriented x2, lying in bed, follows simple commands at times   HEENT:Pink conjunctiva, dry oral mucosa, left side facial droop. Speech clear.   NECK: Supple  CVS:  S1  S2, without murmur or gallop.   LUNG: Clear to auscultation, No wheezes, rales or rhonci. Shallow respiratory effort.   BACK: No kyphosis of the thoracic spine  ABDOMEN: Soft, nontender to palpation, with positive bowel sounds  EXTREMITIES: Left side hemiparesis, no pedal edema  SKIN: Warm and dry  NEUROLOGIC: See above, pulses palpable      Labs:  All labs reviewed in the nursing home record and [facility]       This note has been dictated using voice recognition " software. Any grammatical or context distortions are unintentional and inherent to the software    Electronically signed by: Essence Conteh CNP

## 2024-01-18 NOTE — PROGRESS NOTES
[facility] GERIATRIC SERVICES    Code Status:  DNR   Visit Type:   Chief Complaint   Patient presents with    TCU Follow Up     Facility:  [facility] ([facility]) [[id number]]           HPI: Shaji Markham is a 73 year old male who I am seeing today for follow up on the TCU. 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  Patient hospitalized on 1/4/2024 due to left-sided weakness and right gaze following cardiac catheterization.  Patient underwent a coronary angiogram on 1/4/2024 in the Cath Lab at [facility].  He received a heparin bolus.  After the procedure was done staff noted the patient exhibiting right gaze deviation.  He also endorsed left Heema body numbness and weakness he was transported to the ED accompanied by the neurologist.  In the ED tPA not indicated as patient had already received heparin.  CT was not indicated of LVO so no endovascular treatment was pursued.  Patient given aspirin suppository.  MRI of the brain showed restricted diffusion defect in the right hippocampal body and tail likely representing acute ischemia.  Repeat MRI of the brain 1/5/2024 demonstrated increased size of the recent right PCA territory infarct involving the hippocampus and occipital lobe with mild localized mass effect.  Possible trace SAH overlying the right middle lobe.  EEG negative.  Vascular surgery consulted for carotid disease.  Severe greater than 70% stenosis of bilateral carotid arteries.  Vascular surgery recommended aspirin with possible carotid artery revascularization in the future.  DAPT and statin therapy.  Vascular recommended 3-month follow-up with repeat carotid artery ultrasound.  Cardiothoracic surgery following.  Patient underwent coronary angiogram on 1/4/2024 which showed multivessel CAD including 70% proximal LAD disease.  CT surgery  recommended CABG versus complex percutaneous coronary intervention.  Neurology recommended at least 4 weeks before considering any intervention.  Patient with suicidal thoughts and ideation during hospitalization.  Psychiatric consulted.  Patient found to have a general disorder with depressed mood.  Worsened left-sided weakness on 1/9/2024.  Repeat head CT showed evolving stroke.  Patient with dense left hemiplegia.    Transitional Care Course: Today patient lying in bed. Family present. Patient with left-sided Heema plegia. Pt with poor oral intake. He is being fed but often refuses meal. Reports dislike of the food. Creatine trending upward at 1.88. Discussed today possible need for IV. Pt refusing. Incontinent of both bowel and bladder. Pt denies any pain. No SOB or CP.     Assessment/Plan:     Acute ischemic right posterior cerebral artery (PCA) stroke   Hemiparesis affecting left side as late effect of cerebrovascular accident (CVA)   History of CVA due to occlusion of the left posterior cerebral artery  -Continue Plavix, aspirin and statin.  -Brain MRI showed right PCA stroke.  -Video EEG with no seizure.    Bilateral carotid stenosis  -Vascular surgery consulted for greater than 70% stenosis of bilateral carotid arteries.    -Recommend follow-up in 3 months for repeat ultrasound and possible surgery.  -Continue aspirin, Plavix and statin.    Dehydration  -Creatine 1.88.   -Additional 240 ml with each med pass.   -assist with all meals.   -Repeat BMP on Thursday.     Coronary artery disease of native artery of native heart with stable angina pectoris (H24)  -Status post coronary angiogram on 1/4/2024 revealing multivessel coronary artery disease.  -CV surgery following.  Initially CABG was recommended now cardiology is planning possible PCI.   -Hold off for 4 weeks per neurology recommendations  -Continue aspirin and statin.  -Echocardiogram showed ejection fracture of 59%.  -Continue Isosorbide.     Type 2  diabetes mellitus with hyperosmolarity without coma, unspecified whether long term insulin use (H)  -Consistent carb diet.  -Hemoglobin A1c 6.5% on 1/4/2024.  -Previously on metformin, Farxiga and Ozempic.  Discontinued during hospitalization.  -Blood sugar checks twice daily.  Monitor for hypoglycemia due to poor oral intake.    -looks like sliding scale should have been discontinued but pt admitted on sliding scale with meals. Discontinue today.  Pain management and reach somebody downstairs but they are not answering  -Offer at bedtime snack.    Stage 3b chronic kidney disease (H)  -Baseline creatinine 1.20-1.7.  -Creatine now 1.88 due to poor oral intake.   -Follow-up BMP.    Mixed dyslipidemia  -Continue statin.      Active Ambulatory Problems     Diagnosis Date Noted    Abnormal stress test 01/04/2024    Acute ischemic right posterior cerebral artery (PCA) stroke (H) 01/04/2024    Cerebrovascular accident (CVA) due to occlusion of left posterior cerebral artery (H) 07/04/2022    Type 2 diabetes mellitus (H) 02/16/2017    Tinnitus, bilateral 04/04/2022    Stage 3b chronic kidney disease (H) 03/13/2021    Onychomycosis of right great toe 04/05/2023    NAFLD (nonalcoholic fatty liver disease) 10/09/2019    Mixed dyslipidemia 05/05/2011    Elevated lipoprotein(a) 04/06/2022    Coronary artery disease of native artery of native heart with stable angina pectoris (H24) 01/04/2024    Visceral obesity 11/13/2020    Periodontitis 12/06/2023     Resolved Ambulatory Problems     Diagnosis Date Noted    No Resolved Ambulatory Problems     No Additional Past Medical History     Allergies   Allergen Reactions    Latex Hives and Rash    Anesthetics, Lorna Rash       All Meds and Allergies reviewed in the record at the facility and is the most up-to-date.    Current Outpatient Medications   Medication Sig    aspirin 81 MG EC tablet Take 81 mg by mouth daily    atorvastatin (LIPITOR) 80 MG tablet Take 80 mg by mouth at bedtime  "   clopidogrel (PLAVIX) 75 MG tablet Take 75 mg by mouth daily    isosorbide dinitrate (ISORDIL) 5 MG tablet Take 5 mg by mouth 3 times daily    metoprolol tartrate (LOPRESSOR) 25 MG tablet Take 25 mg by mouth 2 times daily    mirtazapine (REMERON) 15 MG tablet Take 15 mg by mouth at bedtime    nitroGLYcerin (NITROSTAT) 0.4 MG sublingual tablet Place 0.4 mg under the tongue every 5 minutes as needed for chest pain For chest pain place 1 tablet under the tongue every 5 minutes for 3 doses. If symptoms persist 5 minutes after 1st dose call 911.    Suvorexant (BELSOMRA) 10 MG tablet Take 10 mg by mouth nightly as needed for sleep     No current facility-administered medications for this visit.       REVIEW OF SYSTEMS:   10 point review of systems reviewed and pertinent positives in the HPI.     PHYSICAL EXAMINATION:  Physical Exam     Vital signs: BP (!) 142/60   Pulse 68   Temp 98.3  F (36.8  C)   Resp 18   Ht 1.778 m (5' 10\")   Wt 78.4 kg (172 lb 12.8 oz)   SpO2 96%   BMI 24.79 kg/m    General: Awake, Alert, oriented x2, lying in bed, follows simple commands at times   HEENT:Pink conjunctiva, moist oral mucosa, left side facial droop. Speech clear.   NECK: Supple  CVS:  S1  S2, without murmur or gallop.   LUNG: Clear to auscultation, No wheezes, rales or rhonci. Shallow respiratory effort.   BACK: No kyphosis of the thoracic spine  ABDOMEN: Soft, nontender to palpation, with positive bowel sounds  EXTREMITIES: Left side hemiparesis, no pedal edema  SKIN: Warm and dry  NEUROLOGIC: See above, pulses palpable      Labs:  All labs reviewed in the nursing home record and Epic       This note has been dictated using voice recognition software. Any grammatical or context distortions are unintentional and inherent to the software    Electronically signed by: Essence Conteh CNP   "

## 2024-01-19 LAB
ANION GAP SERPL CALCULATED.3IONS-SCNC: 11 MMOL/L (ref 7–15)
BUN SERPL-MCNC: 39.2 MG/DL (ref 8–23)
CALCIUM SERPL-MCNC: 9.7 MG/DL (ref 8.8–10.2)
CHLORIDE SERPL-SCNC: 109 MMOL/L (ref 98–107)
CREAT SERPL-MCNC: 1.64 MG/DL (ref 0.67–1.17)
DEPRECATED HCO3 PLAS-SCNC: 26 MMOL/L (ref 22–29)
EGFRCR SERPLBLD CKD-EPI 2021: 44 ML/MIN/1.73M2
GLUCOSE SERPL-MCNC: 103 MG/DL (ref 70–99)
POTASSIUM SERPL-SCNC: 4.7 MMOL/L (ref 3.4–5.3)
SODIUM SERPL-SCNC: 146 MMOL/L (ref 135–145)

## 2024-01-22 ENCOUNTER — TRANSITIONAL CARE UNIT VISIT (OUTPATIENT)
Dept: GERIATRICS | Facility: CLINIC | Age: 74
End: 2024-01-22
Payer: COMMERCIAL

## 2024-01-22 VITALS
TEMPERATURE: 97.3 F | RESPIRATION RATE: 20 BRPM | SYSTOLIC BLOOD PRESSURE: 131 MMHG | HEIGHT: 70 IN | WEIGHT: 163.2 LBS | OXYGEN SATURATION: 91 % | BODY MASS INDEX: 23.37 KG/M2 | HEART RATE: 79 BPM | DIASTOLIC BLOOD PRESSURE: 76 MMHG

## 2024-01-22 DIAGNOSIS — I63.531 ACUTE ISCHEMIC RIGHT POSTERIOR CEREBRAL ARTERY (PCA) STROKE (H): Primary | ICD-10-CM

## 2024-01-22 DIAGNOSIS — R13.10 DYSPHAGIA, UNSPECIFIED TYPE: ICD-10-CM

## 2024-01-22 DIAGNOSIS — N18.9 ACUTE KIDNEY INJURY SUPERIMPOSED ON CKD (H): ICD-10-CM

## 2024-01-22 DIAGNOSIS — I25.118 CORONARY ARTERY DISEASE OF NATIVE ARTERY OF NATIVE HEART WITH STABLE ANGINA PECTORIS (H): ICD-10-CM

## 2024-01-22 DIAGNOSIS — E11.00 TYPE 2 DIABETES MELLITUS WITH HYPEROSMOLARITY WITHOUT COMA, UNSPECIFIED WHETHER LONG TERM INSULIN USE (H): ICD-10-CM

## 2024-01-22 DIAGNOSIS — N17.9 ACUTE KIDNEY INJURY SUPERIMPOSED ON CKD (H): ICD-10-CM

## 2024-01-22 DIAGNOSIS — E78.2 MIXED DYSLIPIDEMIA: ICD-10-CM

## 2024-01-22 PROCEDURE — 99309 SBSQ NF CARE MODERATE MDM 30: CPT | Performed by: NURSE PRACTITIONER

## 2024-01-22 RX ORDER — ACETAMINOPHEN 325 MG/1
650 TABLET ORAL 3 TIMES DAILY
COMMUNITY

## 2024-01-22 NOTE — LETTER
1/22/2024        RE: Shaji Markham  [address] [facility] [address]        M [facility] GERIATRIC SERVICES    Code Status:  DNR   Visit Type:   Chief Complaint   Patient presents with     TCU Follow Up     Facility:  [facility]) [[id number]]           HPI: Shaji Markham is a 73 year old male who I am seeing today for follow up on the TCU. 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  Patient hospitalized on 1/4/2024 due to left-sided weakness and right gaze following cardiac catheterization.  Patient underwent a coronary angiogram on 1/4/2024 in the Cath Lab at [facility].  He received a heparin bolus.  After the procedure was done staff noted the patient exhibiting right gaze deviation.  He also endorsed left Heema body numbness and weakness he was transported to the ED accompanied by the neurologist.  In the ED tPA not indicated as patient had already received heparin.  CT was not indicated of LVO so no endovascular treatment was pursued.  Patient given aspirin suppository.  MRI of the brain showed restricted diffusion defect in the right hippocampal body and tail likely representing acute ischemia.  Repeat MRI of the brain 1/5/2024 demonstrated increased size of the recent right PCA territory infarct involving the hippocampus and occipital lobe with mild localized mass effect.  Possible trace SAH overlying the right middle lobe.  EEG negative.  Vascular surgery consulted for carotid disease.  Severe greater than 70% stenosis of bilateral carotid arteries.  Vascular surgery recommended aspirin with possible carotid artery revascularization in the future.  DAPT and statin therapy.  Vascular recommended 3-month follow-up with repeat carotid artery ultrasound.  Cardiothoracic surgery following.  Patient underwent coronary angiogram on 1/4/2024  which showed multivessel CAD including 70% proximal LAD disease.  CT surgery recommended CABG versus complex percutaneous coronary intervention.  Neurology recommended at least 4 weeks before considering any intervention.  Patient with suicidal thoughts and ideation during hospitalization.  Psychiatric consulted.  Patient found to have a general disorder with depressed mood.  Worsened left-sided weakness on 1/9/2024.  Repeat head CT showed evolving stroke.  Patient with dense left hemiplegia.    Transitional Care Course: Today patient lying in bed. CVA with left-sided Hemiplegia. Pt more alert today. He reports occasional pain in his LLE. Appetite continues to vary. He needs assistance with all meals. Diet reduced to pureed for ease of chewing.  Fluids are being encouraged. Creatine slightly improved. No SOB or CP.     Assessment/Plan:     Acute ischemic right posterior cerebral artery (PCA) stroke   Hemiparesis affecting left side as late effect of cerebrovascular accident (CVA)   History of CVA due to occlusion of the left posterior cerebral artery  -Continue Plavix, aspirin and statin.  -Brain MRI showed right PCA stroke.  -Video EEG with no seizure.  -Tylenol 650 mg BID.     Dysphagia  -Diet downgraded last week to pureed for ease of chewing. Continues on thickened liquids.   -FEES test today.   -ST following.    Bilateral carotid stenosis  -Vascular surgery consulted for greater than 70% stenosis of bilateral carotid arteries.    -Recommend follow-up in 3 months for repeat ultrasound and possible surgery.  -Continue aspirin, Plavix and statin.    DANIA on CKD, stage 3.   -Baseline creatinine 1.20-1.7.  -Creatine 1.64.  due to poor oral intake.   -Additional 240 ml with each med pass.   -assist with all meals.   -Follow up BMP in am.     Coronary artery disease of native artery of native heart with stable angina pectoris (H24)  -Status post coronary angiogram on 1/4/2024 revealing multivessel coronary artery  disease.  -CV surgery following.  Initially CABG was recommended now cardiology is planning possible PCI.   -Hold off for 4 weeks per neurology recommendations  -Continue aspirin and statin.  -Echocardiogram showed ejection fracture of 59%.  -Continue Isosorbide.     Type 2 diabetes mellitus with hyperosmolarity without coma, unspecified whether long term insulin use (H)  -Consistent carb diet.  -Hemoglobin A1c 6.5% on 1/4/2024.  -Previously on metformin, Farxiga and Ozempic.  Discontinued during hospitalization.  -Blood sugar checks twice daily.  Monitor for hypoglycemia due to poor oral intake.    -looks like sliding scale should have been discontinued but pt admitted on sliding scale with meals. Discontinue today.  Pain management and reach somebody downstairs but they are not answering  -Offer at bedtime snack.    Mixed dyslipidemia  -Continue statin.    Depression   -Continue Remeron 15 mg at bedtime.   -pt with suicidal ideation in hospital.   -Monitor for symptoms.         Active Ambulatory Problems     Diagnosis Date Noted     Abnormal stress test 01/04/2024     Acute ischemic right posterior cerebral artery (PCA) stroke (H) 01/04/2024     Cerebrovascular accident (CVA) due to occlusion of left posterior cerebral artery (H) 07/04/2022     Type 2 diabetes mellitus (H) 02/16/2017     Tinnitus, bilateral 04/04/2022     Stage 3b chronic kidney disease (H) 03/13/2021     Onychomycosis of right great toe 04/05/2023     NAFLD (nonalcoholic fatty liver disease) 10/09/2019     Mixed dyslipidemia 05/05/2011     Elevated lipoprotein(a) 04/06/2022     Coronary artery disease of native artery of native heart with stable angina pectoris (H24) 01/04/2024     Visceral obesity 11/13/2020     Periodontitis 12/06/2023     Resolved Ambulatory Problems     Diagnosis Date Noted     No Resolved Ambulatory Problems     No Additional Past Medical History     Allergies   Allergen Reactions     Latex Hives and Rash     Anesthetics,  "Lorna Rash       All Meds and Allergies reviewed in the record at the facility and is the most up-to-date.    Current Outpatient Medications   Medication Sig     aspirin 81 MG EC tablet Take 81 mg by mouth daily     atorvastatin (LIPITOR) 80 MG tablet Take 80 mg by mouth at bedtime     clopidogrel (PLAVIX) 75 MG tablet Take 75 mg by mouth daily     isosorbide dinitrate (ISORDIL) 5 MG tablet Take 5 mg by mouth 3 times daily     metoprolol tartrate (LOPRESSOR) 25 MG tablet Take 25 mg by mouth 2 times daily     mirtazapine (REMERON) 15 MG tablet Take 15 mg by mouth at bedtime     nitroGLYcerin (NITROSTAT) 0.4 MG sublingual tablet Place 0.4 mg under the tongue every 5 minutes as needed for chest pain For chest pain place 1 tablet under the tongue every 5 minutes for 3 doses. If symptoms persist 5 minutes after 1st dose call 911.     Suvorexant (BELSOMRA) 10 MG tablet Take 10 mg by mouth nightly as needed for sleep     No current facility-administered medications for this visit.       REVIEW OF SYSTEMS:   10 point review of systems reviewed and pertinent positives in the HPI.     PHYSICAL EXAMINATION:  Physical Exam     Vital signs: /76   Pulse 79   Temp 97.3  F (36.3  C)   Resp 20   Ht 1.778 m (5' 10\")   Wt 74 kg (163 lb 3.2 oz)   SpO2 91%   BMI 23.42 kg/m    General: Awake, Alert, oriented x2, lying in bed, follows simple commands at times   HEENT:Pink conjunctiva, dry oral mucosa, left side facial droop. Speech clear.   NECK: Supple  CVS:  S1  S2, without murmur or gallop.   LUNG: Clear to auscultation, No wheezes, rales or rhonci. Shallow respiratory effort.   BACK: No kyphosis of the thoracic spine  ABDOMEN: Soft, nontender to palpation, with positive bowel sounds  EXTREMITIES: Left side hemiparesis, no pedal edema  SKIN: Warm and dry  NEUROLOGIC: See above, pulses palpable      Labs:  All labs reviewed in the nursing home record and [facility]       This note has been dictated using voice recognition " software. Any grammatical or context distortions are unintentional and inherent to the software    Electronically signed by: Essence Conteh CNP       Sincerely,        Essence Conteh, NP

## 2024-01-22 NOTE — PROGRESS NOTES
[facility] GERIATRIC SERVICES    Code Status:  DNR   Visit Type:   Chief Complaint   Patient presents with    TCU Follow Up     Facility:  [facility]) [[id number]]           HPI: Shaji Markham is a 73 year old male who I am seeing today for follow up on the TCU. 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  Patient hospitalized on 1/4/2024 due to left-sided weakness and right gaze following cardiac catheterization.  Patient underwent a coronary angiogram on 1/4/2024 in the Cath Lab at [facility].  He received a heparin bolus.  After the procedure was done staff noted the patient exhibiting right gaze deviation.  He also endorsed left Heema body numbness and weakness he was transported to the ED accompanied by the neurologist.  In the ED tPA not indicated as patient had already received heparin.  CT was not indicated of LVO so no endovascular treatment was pursued.  Patient given aspirin suppository.  MRI of the brain showed restricted diffusion defect in the right hippocampal body and tail likely representing acute ischemia.  Repeat MRI of the brain 1/5/2024 demonstrated increased size of the recent right PCA territory infarct involving the hippocampus and occipital lobe with mild localized mass effect.  Possible trace SAH overlying the right middle lobe.  EEG negative.  Vascular surgery consulted for carotid disease.  Severe greater than 70% stenosis of bilateral carotid arteries.  Vascular surgery recommended aspirin with possible carotid artery revascularization in the future.  DAPT and statin therapy.  Vascular recommended 3-month follow-up with repeat carotid artery ultrasound.  Cardiothoracic surgery following.  Patient underwent coronary angiogram on 1/4/2024 which showed multivessel CAD including 70% proximal LAD disease.  CT surgery  recommended CABG versus complex percutaneous coronary intervention.  Neurology recommended at least 4 weeks before considering any intervention.  Patient with suicidal thoughts and ideation during hospitalization.  Psychiatric consulted.  Patient found to have a general disorder with depressed mood.  Worsened left-sided weakness on 1/9/2024.  Repeat head CT showed evolving stroke.  Patient with dense left hemiplegia.    Transitional Care Course: Today patient lying in bed. CVA with left-sided Hemiplegia. Pt more alert today. He reports occasional pain in his LLE. Appetite continues to vary. He needs assistance with all meals. Diet reduced to pureed for ease of chewing.  Fluids are being encouraged. Creatine slightly improved. No SOB or CP.     Assessment/Plan:     Acute ischemic right posterior cerebral artery (PCA) stroke   Hemiparesis affecting left side as late effect of cerebrovascular accident (CVA)   History of CVA due to occlusion of the left posterior cerebral artery  -Continue Plavix, aspirin and statin.  -Brain MRI showed right PCA stroke.  -Video EEG with no seizure.  -Tylenol 650 mg BID.     Dysphagia  -Diet downgraded last week to pureed for ease of chewing. Continues on thickened liquids.   -FEES test today.   -ST following.    Bilateral carotid stenosis  -Vascular surgery consulted for greater than 70% stenosis of bilateral carotid arteries.    -Recommend follow-up in 3 months for repeat ultrasound and possible surgery.  -Continue aspirin, Plavix and statin.    DANIA on CKD, stage 3.   -Baseline creatinine 1.20-1.7.  -Creatine 1.64.  due to poor oral intake.   -Additional 240 ml with each med pass.   -assist with all meals.   -Follow up BMP in am.     Coronary artery disease of native artery of native heart with stable angina pectoris (H24)  -Status post coronary angiogram on 1/4/2024 revealing multivessel coronary artery disease.  -CV surgery following.  Initially CABG was recommended now cardiology is  planning possible PCI.   -Hold off for 4 weeks per neurology recommendations  -Continue aspirin and statin.  -Echocardiogram showed ejection fracture of 59%.  -Continue Isosorbide.     Type 2 diabetes mellitus with hyperosmolarity without coma, unspecified whether long term insulin use (H)  -Consistent carb diet.  -Hemoglobin A1c 6.5% on 1/4/2024.  -Previously on metformin, Farxiga and Ozempic.  Discontinued during hospitalization.  -Blood sugar checks twice daily.  Monitor for hypoglycemia due to poor oral intake.    -looks like sliding scale should have been discontinued but pt admitted on sliding scale with meals. Discontinue today.  Pain management and reach somebody downstairs but they are not answering  -Offer at bedtime snack.    Mixed dyslipidemia  -Continue statin.    Depression   -Continue Remeron 15 mg at bedtime.   -pt with suicidal ideation in hospital.   -Monitor for symptoms.         Active Ambulatory Problems     Diagnosis Date Noted    Abnormal stress test 01/04/2024    Acute ischemic right posterior cerebral artery (PCA) stroke (H) 01/04/2024    Cerebrovascular accident (CVA) due to occlusion of left posterior cerebral artery (H) 07/04/2022    Type 2 diabetes mellitus (H) 02/16/2017    Tinnitus, bilateral 04/04/2022    Stage 3b chronic kidney disease (H) 03/13/2021    Onychomycosis of right great toe 04/05/2023    NAFLD (nonalcoholic fatty liver disease) 10/09/2019    Mixed dyslipidemia 05/05/2011    Elevated lipoprotein(a) 04/06/2022    Coronary artery disease of native artery of native heart with stable angina pectoris (H24) 01/04/2024    Visceral obesity 11/13/2020    Periodontitis 12/06/2023     Resolved Ambulatory Problems     Diagnosis Date Noted    No Resolved Ambulatory Problems     No Additional Past Medical History     Allergies   Allergen Reactions    Latex Hives and Rash    Anesthetics, Lorna Rash       All Meds and Allergies reviewed in the record at the facility and is the most  "up-to-date.    Current Outpatient Medications   Medication Sig    aspirin 81 MG EC tablet Take 81 mg by mouth daily    atorvastatin (LIPITOR) 80 MG tablet Take 80 mg by mouth at bedtime    clopidogrel (PLAVIX) 75 MG tablet Take 75 mg by mouth daily    isosorbide dinitrate (ISORDIL) 5 MG tablet Take 5 mg by mouth 3 times daily    metoprolol tartrate (LOPRESSOR) 25 MG tablet Take 25 mg by mouth 2 times daily    mirtazapine (REMERON) 15 MG tablet Take 15 mg by mouth at bedtime    nitroGLYcerin (NITROSTAT) 0.4 MG sublingual tablet Place 0.4 mg under the tongue every 5 minutes as needed for chest pain For chest pain place 1 tablet under the tongue every 5 minutes for 3 doses. If symptoms persist 5 minutes after 1st dose call 911.    Suvorexant (BELSOMRA) 10 MG tablet Take 10 mg by mouth nightly as needed for sleep     No current facility-administered medications for this visit.       REVIEW OF SYSTEMS:   10 point review of systems reviewed and pertinent positives in the HPI.     PHYSICAL EXAMINATION:  Physical Exam     Vital signs: /76   Pulse 79   Temp 97.3  F (36.3  C)   Resp 20   Ht 1.778 m (5' 10\")   Wt 74 kg (163 lb 3.2 oz)   SpO2 91%   BMI 23.42 kg/m    General: Awake, Alert, oriented x2, lying in bed, follows simple commands at times   HEENT:Pink conjunctiva, dry oral mucosa, left side facial droop. Speech clear.   NECK: Supple  CVS:  S1  S2, without murmur or gallop.   LUNG: Clear to auscultation, No wheezes, rales or rhonci. Shallow respiratory effort.   BACK: No kyphosis of the thoracic spine  ABDOMEN: Soft, nontender to palpation, with positive bowel sounds  EXTREMITIES: Left side hemiparesis, no pedal edema  SKIN: Warm and dry  NEUROLOGIC: See above, pulses palpable      Labs:  All labs reviewed in the nursing home record and [facility]       This note has been dictated using voice recognition software. Any grammatical or context distortions are unintentional and inherent to the " software    Electronically signed by: Essence Conteh, CNP

## 2024-01-23 ENCOUNTER — LAB REQUISITION (OUTPATIENT)
Dept: LAB | Facility: CLINIC | Age: 74
End: 2024-01-23
Payer: COMMERCIAL

## 2024-01-23 ENCOUNTER — TRANSITIONAL CARE UNIT VISIT (OUTPATIENT)
Dept: GERIATRICS | Facility: CLINIC | Age: 74
End: 2024-01-23
Payer: COMMERCIAL

## 2024-01-23 VITALS
RESPIRATION RATE: 20 BRPM | WEIGHT: 163.2 LBS | HEIGHT: 70 IN | SYSTOLIC BLOOD PRESSURE: 103 MMHG | TEMPERATURE: 97.3 F | OXYGEN SATURATION: 95 % | BODY MASS INDEX: 23.37 KG/M2 | DIASTOLIC BLOOD PRESSURE: 74 MMHG | HEART RATE: 87 BPM

## 2024-01-23 DIAGNOSIS — F32.0 CURRENT MILD EPISODE OF MAJOR DEPRESSIVE DISORDER, UNSPECIFIED WHETHER RECURRENT (H): ICD-10-CM

## 2024-01-23 DIAGNOSIS — N18.32 CHRONIC KIDNEY DISEASE, STAGE 3B (H): ICD-10-CM

## 2024-01-23 DIAGNOSIS — R45.1 AGITATION: ICD-10-CM

## 2024-01-23 DIAGNOSIS — R13.10 DYSPHAGIA, UNSPECIFIED TYPE: ICD-10-CM

## 2024-01-23 DIAGNOSIS — E11.00 TYPE 2 DIABETES MELLITUS WITH HYPEROSMOLARITY WITHOUT COMA, UNSPECIFIED WHETHER LONG TERM INSULIN USE (H): ICD-10-CM

## 2024-01-23 DIAGNOSIS — I63.531 ACUTE ISCHEMIC RIGHT POSTERIOR CEREBRAL ARTERY (PCA) STROKE (H): Primary | ICD-10-CM

## 2024-01-23 DIAGNOSIS — I25.118 CORONARY ARTERY DISEASE OF NATIVE ARTERY OF NATIVE HEART WITH STABLE ANGINA PECTORIS (H): ICD-10-CM

## 2024-01-23 DIAGNOSIS — E78.2 MIXED DYSLIPIDEMIA: ICD-10-CM

## 2024-01-23 PROCEDURE — 99309 SBSQ NF CARE MODERATE MDM 30: CPT | Performed by: NURSE PRACTITIONER

## 2024-01-23 RX ORDER — QUETIAPINE FUMARATE 25 MG/1
25 TABLET, FILM COATED ORAL 2 TIMES DAILY
COMMUNITY
Start: 2024-03-14

## 2024-01-23 NOTE — LETTER
1/23/2024        RE: Shaji Markham  [address] [facility] [address]        M [facility] GERIATRIC SERVICES    Code Status:  DNR   Visit Type:   Chief Complaint   Patient presents with     TCU Follow Up     Facility:  [facility]) [[id number]]           HPI: Shaji Markham is a 73 year old male who I am seeing today for follow up on the TCU. 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  Patient hospitalized on 1/4/2024 due to left-sided weakness and right gaze following cardiac catheterization.  Patient underwent a coronary angiogram on 1/4/2024 in the Cath Lab at [facility].  He received a heparin bolus.  After the procedure was done staff noted the patient exhibiting right gaze deviation.  He also endorsed left Heema body numbness and weakness he was transported to the ED accompanied by the neurologist.  In the ED tPA not indicated as patient had already received heparin.  CT was not indicated of LVO so no endovascular treatment was pursued.  Patient given aspirin suppository.  MRI of the brain showed restricted diffusion defect in the right hippocampal body and tail likely representing acute ischemia.  Repeat MRI of the brain 1/5/2024 demonstrated increased size of the recent right PCA territory infarct involving the hippocampus and occipital lobe with mild localized mass effect.  Possible trace SAH overlying the right middle lobe.  EEG negative.  Vascular surgery consulted for carotid disease.  Severe greater than 70% stenosis of bilateral carotid arteries.  Vascular surgery recommended aspirin with possible carotid artery revascularization in the future.  DAPT and statin therapy.  Vascular recommended 3-month follow-up with repeat carotid artery ultrasound.  Cardiothoracic surgery following.  Patient underwent coronary angiogram on 1/4/2024  which showed multivessel CAD including 70% proximal LAD disease.  CT surgery recommended CABG versus complex percutaneous coronary intervention.  Neurology recommended at least 4 weeks before considering any intervention.  Patient with suicidal thoughts and ideation during hospitalization.  Psychiatric consulted.  Patient found to have a general disorder with depressed mood.  Worsened left-sided weakness on 1/9/2024.  Repeat head CT showed evolving stroke.  Patient with dense left hemiplegia.    Transitional Care Course: Today patient up in wheelchair. He is very agitated on exam.  He is hitting and kicking the wall in the hallway of his room. I was notified by the nursing staff earlier,  patient attempting to kick up walls and get back in wheelchair.  He was running into tables and attempting to turn over chairs in the dining room.  Recent CVA with left-sided Hemiplegia.  Patient with a history of depression.  He continues on Remeron.  He had suicidal ideation during hospitalization. He was seen by psychiatry.  I did review with patient on effects of stroke on the brain.  I also spoke with his daughter.  Both patient and daughter are in agreement for medication for mood support.  Appetite continues to vary.  He is on puréed diet.  Fluids are being encouraged.      Assessment/Plan:     Acute ischemic right posterior cerebral artery (PCA) stroke   Hemiparesis affecting left side as late effect of cerebrovascular accident (CVA)   History of CVA due to occlusion of the left posterior cerebral artery  -Continue Plavix, aspirin and statin.  -Brain MRI showed right PCA stroke.  -Video EEG with no seizure.  -Tylenol 650 mg BID.     Dysphagia  -Diet downgraded last week to pureed for ease of chewing. Continues on thickened liquids.   -FEES test today.   -ST following.    Agitation  -stroke induced.   -Seroquel 12.5 mg BID.   -ok for ACP.     Depression   -Continue Remeron 15 mg at bedtime.   -pt with suicidal ideation in  hospital.   -Ok for ACP.   -Monitor for symptoms.     Bilateral carotid stenosis  -Vascular surgery consulted for greater than 70% stenosis of bilateral carotid arteries.    -Recommend follow-up in 3 months for repeat ultrasound and possible surgery.  -Continue aspirin, Plavix and statin.    DANIA on CKD, stage 3.   -Baseline creatinine 1.20-1.7.  -Creatine 1.64.  due to poor oral intake.   -Additional 240 ml with each med pass.   -assist with all meals.   -Follow up BMP on Thursday.     Coronary artery disease of native artery of native heart with stable angina pectoris (H24)  -Status post coronary angiogram on 1/4/2024 revealing multivessel coronary artery disease.  -CV surgery following.  Initially CABG was recommended now cardiology is planning possible PCI.   -Hold off for 4 weeks per neurology recommendations  -Continue aspirin and statin.  -Echocardiogram showed ejection fracture of 59%.  -Continue Isosorbide.     Type 2 diabetes mellitus with hyperosmolarity without coma, unspecified whether long term insulin use (H)  -Consistent carb diet.  -Hemoglobin A1c 6.5% on 1/4/2024.  -Previously on metformin, Farxiga and Ozempic.  Discontinued during hospitalization.  -Blood sugar checks twice daily.  Monitor for hypoglycemia due to poor oral intake.   -Offer at bedtime snack.    Mixed dyslipidemia  -Continue statin.      Active Ambulatory Problems     Diagnosis Date Noted     Abnormal stress test 01/04/2024     Acute ischemic right posterior cerebral artery (PCA) stroke (H) 01/04/2024     Cerebrovascular accident (CVA) due to occlusion of left posterior cerebral artery (H) 07/04/2022     Type 2 diabetes mellitus (H) 02/16/2017     Tinnitus, bilateral 04/04/2022     Stage 3b chronic kidney disease (H) 03/13/2021     Onychomycosis of right great toe 04/05/2023     NAFLD (nonalcoholic fatty liver disease) 10/09/2019     Mixed dyslipidemia 05/05/2011     Elevated lipoprotein(a) 04/06/2022     Coronary artery disease of  "native artery of native heart with stable angina pectoris (H24) 01/04/2024     Visceral obesity 11/13/2020     Periodontitis 12/06/2023     Resolved Ambulatory Problems     Diagnosis Date Noted     No Resolved Ambulatory Problems     No Additional Past Medical History     Allergies   Allergen Reactions     Latex Hives and Rash     Anesthetics, Lorna Rash       All Meds and Allergies reviewed in the record at the facility and is the most up-to-date.    Current Outpatient Medications   Medication Sig     QUEtiapine (SEROQUEL) 25 MG tablet Take 12.5 mg by mouth 2 times daily     acetaminophen (TYLENOL) 325 MG tablet Take 650 mg by mouth 2 times daily     aspirin 81 MG EC tablet Take 81 mg by mouth daily     atorvastatin (LIPITOR) 80 MG tablet Take 80 mg by mouth at bedtime     clopidogrel (PLAVIX) 75 MG tablet Take 75 mg by mouth daily     isosorbide dinitrate (ISORDIL) 5 MG tablet Take 5 mg by mouth 3 times daily     metoprolol tartrate (LOPRESSOR) 25 MG tablet Take 25 mg by mouth 2 times daily     mirtazapine (REMERON) 15 MG tablet Take 15 mg by mouth at bedtime     nitroGLYcerin (NITROSTAT) 0.4 MG sublingual tablet Place 0.4 mg under the tongue every 5 minutes as needed for chest pain For chest pain place 1 tablet under the tongue every 5 minutes for 3 doses. If symptoms persist 5 minutes after 1st dose call 911.     Suvorexant (BELSOMRA) 10 MG tablet Take 10 mg by mouth nightly as needed for sleep     No current facility-administered medications for this visit.       REVIEW OF SYSTEMS:   10 point review of systems reviewed and pertinent positives in the HPI.     PHYSICAL EXAMINATION:  Physical Exam     Vital signs: /74   Pulse 87   Temp 97.3  F (36.3  C)   Resp 20   Ht 1.778 m (5' 10\")   Wt 74 kg (163 lb 3.2 oz)   SpO2 95%   BMI 23.42 kg/m    General: Awake, Alert, oriented x2, lying in bed, follows simple commands at times   HEENT:Pink conjunctiva, dry oral mucosa, left side facial droop. Speech " clear.   NECK: Supple  BACK: No kyphosis of the thoracic spine  EXTREMITIES: Left side hemiparesis, no pedal edema  SKIN: Warm and dry  NEUROLOGIC: See above, pulses palpable  PSYCHIATRIC: Very agitated on exam.      Labs:  All labs reviewed in the nursing home record and Epic       This note has been dictated using voice recognition software. Any grammatical or context distortions are unintentional and inherent to the software    Electronically signed by: Essence Conteh CNP       Sincerely,        Essence Conteh NP

## 2024-01-24 NOTE — PROGRESS NOTES
[facility] GERIATRIC SERVICES    Code Status:  DNR   Visit Type:   Chief Complaint   Patient presents with    TCU Follow Up     Facility:  [facility] ([facility]) [[id number]]           HPI: Shaji Markham is a 73 year old male who I am seeing today for follow up on the TCU. 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  Patient hospitalized on 1/4/2024 due to left-sided weakness and right gaze following cardiac catheterization.  Patient underwent a coronary angiogram on 1/4/2024 in the Cath Lab at [facility].  He received a heparin bolus.  After the procedure was done staff noted the patient exhibiting right gaze deviation.  He also endorsed left Heema body numbness and weakness he was transported to the ED accompanied by the neurologist.  In the ED tPA not indicated as patient had already received heparin.  CT was not indicated of LVO so no endovascular treatment was pursued.  Patient given aspirin suppository.  MRI of the brain showed restricted diffusion defect in the right hippocampal body and tail likely representing acute ischemia.  Repeat MRI of the brain 1/5/2024 demonstrated increased size of the recent right PCA territory infarct involving the hippocampus and occipital lobe with mild localized mass effect.  Possible trace SAH overlying the right middle lobe.  EEG negative.  Vascular surgery consulted for carotid disease.  Severe greater than 70% stenosis of bilateral carotid arteries.  Vascular surgery recommended aspirin with possible carotid artery revascularization in the future.  DAPT and statin therapy.  Vascular recommended 3-month follow-up with repeat carotid artery ultrasound.  Cardiothoracic surgery following.  Patient underwent coronary angiogram on 1/4/2024 which showed multivessel CAD including 70% proximal LAD disease.  CT surgery  recommended CABG versus complex percutaneous coronary intervention.  Neurology recommended at least 4 weeks before considering any intervention.  Patient with suicidal thoughts and ideation during hospitalization.  Psychiatric consulted.  Patient found to have a general disorder with depressed mood.  Worsened left-sided weakness on 1/9/2024.  Repeat head CT showed evolving stroke.  Patient with dense left hemiplegia.    Transitional Care Course: Today patient up in wheelchair. He is very agitated on exam.  He is hitting and kicking the wall in the hallway of his room. I was notified by the nursing staff earlier,  patient attempting to kick up walls and get back in wheelchair.  He was running into tables and attempting to turn over chairs in the dining room.  Recent CVA with left-sided Hemiplegia.  Patient with a history of depression.  He continues on Remeron.  He had suicidal ideation during hospitalization. He was seen by psychiatry.  I did review with patient on effects of stroke on the brain.  I also spoke with his daughter.  Both patient and daughter are in agreement for medication for mood support.  Appetite continues to vary.  He is on puréed diet.  Fluids are being encouraged.      Assessment/Plan:     Acute ischemic right posterior cerebral artery (PCA) stroke   Hemiparesis affecting left side as late effect of cerebrovascular accident (CVA)   History of CVA due to occlusion of the left posterior cerebral artery  -Continue Plavix, aspirin and statin.  -Brain MRI showed right PCA stroke.  -Video EEG with no seizure.  -Tylenol 650 mg BID.     Dysphagia  -Diet downgraded last week to pureed for ease of chewing. Continues on thickened liquids.   -FEES test today.   -ST following.    Agitation  -stroke induced.   -Seroquel 12.5 mg BID.   -ok for ACP.     Depression   -Continue Remeron 15 mg at bedtime.   -pt with suicidal ideation in hospital.   -Ok for ACP.   -Monitor for symptoms.     Bilateral carotid  stenosis  -Vascular surgery consulted for greater than 70% stenosis of bilateral carotid arteries.    -Recommend follow-up in 3 months for repeat ultrasound and possible surgery.  -Continue aspirin, Plavix and statin.    DANIA on CKD, stage 3.   -Baseline creatinine 1.20-1.7.  -Creatine 1.64.  due to poor oral intake.   -Additional 240 ml with each med pass.   -assist with all meals.   -Follow up BMP on Thursday.     Coronary artery disease of native artery of native heart with stable angina pectoris (H24)  -Status post coronary angiogram on 1/4/2024 revealing multivessel coronary artery disease.  -CV surgery following.  Initially CABG was recommended now cardiology is planning possible PCI.   -Hold off for 4 weeks per neurology recommendations  -Continue aspirin and statin.  -Echocardiogram showed ejection fracture of 59%.  -Continue Isosorbide.     Type 2 diabetes mellitus with hyperosmolarity without coma, unspecified whether long term insulin use (H)  -Consistent carb diet.  -Hemoglobin A1c 6.5% on 1/4/2024.  -Previously on metformin, Farxiga and Ozempic.  Discontinued during hospitalization.  -Blood sugar checks twice daily.  Monitor for hypoglycemia due to poor oral intake.   -Offer at bedtime snack.    Mixed dyslipidemia  -Continue statin.      Active Ambulatory Problems     Diagnosis Date Noted    Abnormal stress test 01/04/2024    Acute ischemic right posterior cerebral artery (PCA) stroke (H) 01/04/2024    Cerebrovascular accident (CVA) due to occlusion of left posterior cerebral artery (H) 07/04/2022    Type 2 diabetes mellitus (H) 02/16/2017    Tinnitus, bilateral 04/04/2022    Stage 3b chronic kidney disease (H) 03/13/2021    Onychomycosis of right great toe 04/05/2023    NAFLD (nonalcoholic fatty liver disease) 10/09/2019    Mixed dyslipidemia 05/05/2011    Elevated lipoprotein(a) 04/06/2022    Coronary artery disease of native artery of native heart with stable angina pectoris (H24) 01/04/2024     "Visceral obesity 11/13/2020    Periodontitis 12/06/2023     Resolved Ambulatory Problems     Diagnosis Date Noted    No Resolved Ambulatory Problems     No Additional Past Medical History     Allergies   Allergen Reactions    Latex Hives and Rash    Anesthetics, Lorna Rash       All Meds and Allergies reviewed in the record at the facility and is the most up-to-date.    Current Outpatient Medications   Medication Sig    QUEtiapine (SEROQUEL) 25 MG tablet Take 12.5 mg by mouth 2 times daily    acetaminophen (TYLENOL) 325 MG tablet Take 650 mg by mouth 2 times daily    aspirin 81 MG EC tablet Take 81 mg by mouth daily    atorvastatin (LIPITOR) 80 MG tablet Take 80 mg by mouth at bedtime    clopidogrel (PLAVIX) 75 MG tablet Take 75 mg by mouth daily    isosorbide dinitrate (ISORDIL) 5 MG tablet Take 5 mg by mouth 3 times daily    metoprolol tartrate (LOPRESSOR) 25 MG tablet Take 25 mg by mouth 2 times daily    mirtazapine (REMERON) 15 MG tablet Take 15 mg by mouth at bedtime    nitroGLYcerin (NITROSTAT) 0.4 MG sublingual tablet Place 0.4 mg under the tongue every 5 minutes as needed for chest pain For chest pain place 1 tablet under the tongue every 5 minutes for 3 doses. If symptoms persist 5 minutes after 1st dose call 911.    Suvorexant (BELSOMRA) 10 MG tablet Take 10 mg by mouth nightly as needed for sleep     No current facility-administered medications for this visit.       REVIEW OF SYSTEMS:   10 point review of systems reviewed and pertinent positives in the HPI.     PHYSICAL EXAMINATION:  Physical Exam     Vital signs: /74   Pulse 87   Temp 97.3  F (36.3  C)   Resp 20   Ht 1.778 m (5' 10\")   Wt 74 kg (163 lb 3.2 oz)   SpO2 95%   BMI 23.42 kg/m    General: Awake, Alert, oriented x2, lying in bed, follows simple commands at times   HEENT:Pink conjunctiva, dry oral mucosa, left side facial droop. Speech clear.   NECK: Supple  BACK: No kyphosis of the thoracic spine  EXTREMITIES: Left side " hemiparesis, no pedal edema  SKIN: Warm and dry  NEUROLOGIC: See above, pulses palpable  PSYCHIATRIC: Very agitated on exam.      Labs:  All labs reviewed in the nursing home record and Epic       This note has been dictated using voice recognition software. Any grammatical or context distortions are unintentional and inherent to the software    Electronically signed by: Essence Conteh CNP

## 2024-01-25 ENCOUNTER — TRANSITIONAL CARE UNIT VISIT (OUTPATIENT)
Dept: GERIATRICS | Facility: CLINIC | Age: 74
End: 2024-01-25
Payer: COMMERCIAL

## 2024-01-25 VITALS
HEIGHT: 70 IN | BODY MASS INDEX: 22.82 KG/M2 | WEIGHT: 159.4 LBS | TEMPERATURE: 97.3 F | HEART RATE: 89 BPM | OXYGEN SATURATION: 98 % | DIASTOLIC BLOOD PRESSURE: 84 MMHG | SYSTOLIC BLOOD PRESSURE: 146 MMHG | RESPIRATION RATE: 18 BRPM

## 2024-01-25 DIAGNOSIS — R13.10 DYSPHAGIA, UNSPECIFIED TYPE: ICD-10-CM

## 2024-01-25 DIAGNOSIS — I25.118 CORONARY ARTERY DISEASE OF NATIVE ARTERY OF NATIVE HEART WITH STABLE ANGINA PECTORIS (H): ICD-10-CM

## 2024-01-25 DIAGNOSIS — F32.0 CURRENT MILD EPISODE OF MAJOR DEPRESSIVE DISORDER, UNSPECIFIED WHETHER RECURRENT (H): ICD-10-CM

## 2024-01-25 DIAGNOSIS — N18.9 ACUTE KIDNEY INJURY SUPERIMPOSED ON CKD (H): ICD-10-CM

## 2024-01-25 DIAGNOSIS — E11.00 TYPE 2 DIABETES MELLITUS WITH HYPEROSMOLARITY WITHOUT COMA, UNSPECIFIED WHETHER LONG TERM INSULIN USE (H): ICD-10-CM

## 2024-01-25 DIAGNOSIS — N17.9 ACUTE KIDNEY INJURY SUPERIMPOSED ON CKD (H): ICD-10-CM

## 2024-01-25 DIAGNOSIS — I63.531 ACUTE ISCHEMIC RIGHT POSTERIOR CEREBRAL ARTERY (PCA) STROKE (H): Primary | ICD-10-CM

## 2024-01-25 DIAGNOSIS — R45.1 AGITATION: ICD-10-CM

## 2024-01-25 PROCEDURE — 99309 SBSQ NF CARE MODERATE MDM 30: CPT | Performed by: NURSE PRACTITIONER

## 2024-01-25 NOTE — LETTER
1/25/2024        RE: Shaji Markham  [address] [facility] [place] [address]        [facility] GERIATRIC SERVICES    Code Status:  DNR   Visit Type:   Chief Complaint   Patient presents with     TCU follow up     Facility:  [facility]) [[id number]]           HPI: Shaji Markham is a 73 year old male who I am seeing today for follow up on the TCU. 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  Patient hospitalized on 1/4/2024 due to left-sided weakness and right gaze following cardiac catheterization.  Patient underwent a coronary angiogram on 1/4/2024 in the Cath Lab at [facility].  He received a heparin bolus.  After the procedure was done staff noted the patient exhibiting right gaze deviation.  He also endorsed left Heema body numbness and weakness he was transported to the ED accompanied by the neurologist.  In the ED tPA not indicated as patient had already received heparin.  CT was not indicated of LVO so no endovascular treatment was pursued.  Patient given aspirin suppository.  MRI of the brain showed restricted diffusion defect in the right hippocampal body and tail likely representing acute ischemia.  Repeat MRI of the brain 1/5/2024 demonstrated increased size of the recent right PCA territory infarct involving the hippocampus and occipital lobe with mild localized mass effect.  Possible trace SAH overlying the right middle lobe.  EEG negative.  Vascular surgery consulted for carotid disease.  Severe greater than 70% stenosis of bilateral carotid arteries.  Vascular surgery recommended aspirin with possible carotid artery revascularization in the future.  DAPT and statin therapy.  Vascular recommended 3-month follow-up with repeat carotid artery ultrasound.  Cardiothoracic surgery following.  Patient underwent coronary angiogram on 1/4/2024  which showed multivessel CAD including 70% proximal LAD disease.  CT surgery recommended CABG versus complex percutaneous coronary intervention.  Neurology recommended at least 4 weeks before considering any intervention.  Patient with suicidal thoughts and ideation during hospitalization.  Psychiatric consulted.  Patient found to have a general disorder with depressed mood.  Worsened left-sided weakness on 1/9/2024.  Repeat head CT showed evolving stroke.  Patient with dense left hemiplegia. Therapy reporting occ. Increased spasticity.     Transitional Care Course: Today patient lying in bed. Recent CVA with left sided lamar. Pt with recent increased agitation and restlessness. He was started on Seroquel 12.5 mg BID. Behaviors slightly improved. Pt also placed on tylenol 650 mg TID. He continues on Remeron for mood and appetite stimulant. Appetite slightly improving. He underwent FEES test and diet and liquids upgraded.     Assessment/Plan:     Acute ischemic right posterior cerebral artery (PCA) stroke   Hemiparesis affecting left side as late effect of cerebrovascular accident (CVA)   History of CVA due to occlusion of the left posterior cerebral artery  -Continue Plavix, aspirin and statin.  -Brain MRI showed right PCA stroke.  -Video EEG with no seizure.  -Increase Tylenol 650 mg TID.   -occaional increased spasticity. Monitor.     Dysphagia  -Diet downgraded last week to pureed for ease of chewing. Continues on thickened liquids.   -FEES test showed no aspiration noted across trials but penetration noted on large sip of thin liquids.   -Diet down graded to IDDSI 4 with thin liquids with 1:1 supervision.   -High Risk for aspiration.   -ST following.    Agitation  -stroke induced.   -Seroquel 12.5 mg BID.   -ok for ACP.     Depression   -Continue Remeron 15 mg at bedtime.   -pt with suicidal ideation in hospital.   -Ok for ACP.   -Monitor for symptoms.     DANIA on CKD, stage 3.   -Baseline creatinine  1.20-1.7.  -Creatine 1.64.   -Additional 240 ml with each med pass.   -assist with all meals.   -Follow up BMP.     Coronary artery disease of native artery of native heart with stable angina pectoris (H24)  -Status post coronary angiogram on 1/4/2024 revealing multivessel coronary artery disease.  -CV surgery following.  Initially CABG was recommended now cardiology is planning possible PCI.   -Hold off for 4 weeks per neurology recommendations  -Continue aspirin and statin.  -Echocardiogram showed ejection fracture of 59%.  -Continue Isosorbide.     Type 2 diabetes mellitus with hyperosmolarity without coma, unspecified whether long term insulin use (H)  -Consistent carb diet.  -Hemoglobin A1c 6.5% on 1/4/2024.  -Previously on metformin, Farxiga and Ozempic.  Discontinued during hospitalization.  -Blood sugar checks twice daily.  Monitor for hypoglycemia due to poor oral intake.   -Offer at bedtime snack.      Active Ambulatory Problems     Diagnosis Date Noted     Abnormal stress test 01/04/2024     Acute ischemic right posterior cerebral artery (PCA) stroke (H) 01/04/2024     Cerebrovascular accident (CVA) due to occlusion of left posterior cerebral artery (H) 07/04/2022     Type 2 diabetes mellitus (H) 02/16/2017     Tinnitus, bilateral 04/04/2022     Stage 3b chronic kidney disease (H) 03/13/2021     Onychomycosis of right great toe 04/05/2023     NAFLD (nonalcoholic fatty liver disease) 10/09/2019     Mixed dyslipidemia 05/05/2011     Elevated lipoprotein(a) 04/06/2022     Coronary artery disease of native artery of native heart with stable angina pectoris (H24) 01/04/2024     Visceral obesity 11/13/2020     Periodontitis 12/06/2023     Resolved Ambulatory Problems     Diagnosis Date Noted     No Resolved Ambulatory Problems     No Additional Past Medical History     Allergies   Allergen Reactions     Latex Hives and Rash     Anesthetics, Lorna Rash       All Meds and Allergies reviewed in the record at the  "facility and is the most up-to-date.    Current Outpatient Medications   Medication Sig     acetaminophen (TYLENOL) 325 MG tablet Take 650 mg by mouth 3 times daily     aspirin 81 MG EC tablet Take 81 mg by mouth daily     atorvastatin (LIPITOR) 80 MG tablet Take 80 mg by mouth at bedtime     clopidogrel (PLAVIX) 75 MG tablet Take 75 mg by mouth daily     isosorbide dinitrate (ISORDIL) 5 MG tablet Take 5 mg by mouth 3 times daily     metoprolol tartrate (LOPRESSOR) 25 MG tablet Take 25 mg by mouth 2 times daily     mirtazapine (REMERON) 15 MG tablet Take 15 mg by mouth at bedtime     nitroGLYcerin (NITROSTAT) 0.4 MG sublingual tablet Place 0.4 mg under the tongue every 5 minutes as needed for chest pain For chest pain place 1 tablet under the tongue every 5 minutes for 3 doses. If symptoms persist 5 minutes after 1st dose call 911.     QUEtiapine (SEROQUEL) 25 MG tablet Take 12.5 mg by mouth 2 times daily     Suvorexant (BELSOMRA) 10 MG tablet Take 10 mg by mouth nightly as needed for sleep     No current facility-administered medications for this visit.       REVIEW OF SYSTEMS:   10 point review of systems reviewed and pertinent positives in the HPI.     PHYSICAL EXAMINATION:  Physical Exam     Vital signs: BP (!) 146/84   Pulse 89   Temp 97.3  F (36.3  C)   Resp 18   Ht 1.778 m (5' 10\")   Wt 72.3 kg (159 lb 6.4 oz)   SpO2 98%   BMI 22.87 kg/m    General: Awake, Alert, oriented x2, lying in bed, follows simple commands at times   HEENT:Pink conjunctiva, dry oral mucosa, left side facial droop. Speech clear.   CARDIOVASCULAR: S1. S2 without murmur or gallop.   RESPIRATORY: No wheezes, rales or rhonchi.   NECK: Supple  BACK: No kyphosis of the thoracic spine  EXTREMITIES: Left side hemiparesis, no pedal edema  SKIN: Warm and dry  NEUROLOGIC: See above, pulses palpable  PSYCHIATRIC: Flat affect.       Labs:  All labs reviewed in the nursing home record and [facility]       This note has been dictated using voice " recognition software. Any grammatical or context distortions are unintentional and inherent to the software    Electronically signed by: Essence Conteh CNP       Sincerely,        Essence Conteh, NP

## 2024-01-26 NOTE — PROGRESS NOTES
[facility] GERIATRIC SERVICES    Code Status:  DNR   Visit Type:   Chief Complaint   Patient presents with    TCU follow up     Facility:  [facility] ([facility]) [[id number]]           HPI: Shaji Markham is a 73 year old male who I am seeing today for follow up on the TCU. 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  Patient hospitalized on 1/4/2024 due to left-sided weakness and right gaze following cardiac catheterization.  Patient underwent a coronary angiogram on 1/4/2024 in the Cath Lab at [facility].  He received a heparin bolus.  After the procedure was done staff noted the patient exhibiting right gaze deviation.  He also endorsed left Heema body numbness and weakness he was transported to the ED accompanied by the neurologist.  In the ED tPA not indicated as patient had already received heparin.  CT was not indicated of LVO so no endovascular treatment was pursued.  Patient given aspirin suppository.  MRI of the brain showed restricted diffusion defect in the right hippocampal body and tail likely representing acute ischemia.  Repeat MRI of the brain 1/5/2024 demonstrated increased size of the recent right PCA territory infarct involving the hippocampus and occipital lobe with mild localized mass effect.  Possible trace SAH overlying the right middle lobe.  EEG negative.  Vascular surgery consulted for carotid disease.  Severe greater than 70% stenosis of bilateral carotid arteries.  Vascular surgery recommended aspirin with possible carotid artery revascularization in the future.  DAPT and statin therapy.  Vascular recommended 3-month follow-up with repeat carotid artery ultrasound.  Cardiothoracic surgery following.  Patient underwent coronary angiogram on 1/4/2024 which showed multivessel CAD including 70% proximal LAD disease.  CT surgery  recommended CABG versus complex percutaneous coronary intervention.  Neurology recommended at least 4 weeks before considering any intervention.  Patient with suicidal thoughts and ideation during hospitalization.  Psychiatric consulted.  Patient found to have a general disorder with depressed mood.  Worsened left-sided weakness on 1/9/2024.  Repeat head CT showed evolving stroke.  Patient with dense left hemiplegia. Therapy reporting occ. Increased spasticity.     Transitional Care Course: Today patient lying in bed. Recent CVA with left sided lamar. Pt with recent increased agitation and restlessness. He was started on Seroquel 12.5 mg BID. Behaviors slightly improved. Pt also placed on tylenol 650 mg TID. He continues on Remeron for mood and appetite stimulant. Appetite slightly improving. He underwent FEES test and diet and liquids upgraded.     Assessment/Plan:     Acute ischemic right posterior cerebral artery (PCA) stroke   Hemiparesis affecting left side as late effect of cerebrovascular accident (CVA)   History of CVA due to occlusion of the left posterior cerebral artery  -Continue Plavix, aspirin and statin.  -Brain MRI showed right PCA stroke.  -Video EEG with no seizure.  -Increase Tylenol 650 mg TID.   -occaional increased spasticity. Monitor.     Dysphagia  -Diet downgraded last week to pureed for ease of chewing. Continues on thickened liquids.   -FEES test showed no aspiration noted across trials but penetration noted on large sip of thin liquids.   -Diet down graded to IDDSI 4 with thin liquids with 1:1 supervision.   -High Risk for aspiration.   -ST following.    Agitation  -stroke induced.   -Seroquel 12.5 mg BID.   -ok for ACP.     Depression   -Continue Remeron 15 mg at bedtime.   -pt with suicidal ideation in hospital.   -Ok for ACP.   -Monitor for symptoms.     DANIA on CKD, stage 3.   -Baseline creatinine 1.20-1.7.  -Creatine 1.64.   -Additional 240 ml with each med pass.   -assist with all  meals.   -Follow up BMP.     Coronary artery disease of native artery of native heart with stable angina pectoris (H24)  -Status post coronary angiogram on 1/4/2024 revealing multivessel coronary artery disease.  -CV surgery following.  Initially CABG was recommended now cardiology is planning possible PCI.   -Hold off for 4 weeks per neurology recommendations  -Continue aspirin and statin.  -Echocardiogram showed ejection fracture of 59%.  -Continue Isosorbide.     Type 2 diabetes mellitus with hyperosmolarity without coma, unspecified whether long term insulin use (H)  -Consistent carb diet.  -Hemoglobin A1c 6.5% on 1/4/2024.  -Previously on metformin, Farxiga and Ozempic.  Discontinued during hospitalization.  -Blood sugar checks twice daily.  Monitor for hypoglycemia due to poor oral intake.   -Offer at bedtime snack.      Active Ambulatory Problems     Diagnosis Date Noted    Abnormal stress test 01/04/2024    Acute ischemic right posterior cerebral artery (PCA) stroke (H) 01/04/2024    Cerebrovascular accident (CVA) due to occlusion of left posterior cerebral artery (H) 07/04/2022    Type 2 diabetes mellitus (H) 02/16/2017    Tinnitus, bilateral 04/04/2022    Stage 3b chronic kidney disease (H) 03/13/2021    Onychomycosis of right great toe 04/05/2023    NAFLD (nonalcoholic fatty liver disease) 10/09/2019    Mixed dyslipidemia 05/05/2011    Elevated lipoprotein(a) 04/06/2022    Coronary artery disease of native artery of native heart with stable angina pectoris (H24) 01/04/2024    Visceral obesity 11/13/2020    Periodontitis 12/06/2023     Resolved Ambulatory Problems     Diagnosis Date Noted    No Resolved Ambulatory Problems     No Additional Past Medical History     Allergies   Allergen Reactions    Latex Hives and Rash    Anesthetics, Lorna Rash       All Meds and Allergies reviewed in the record at the facility and is the most up-to-date.    Current Outpatient Medications   Medication Sig    acetaminophen  "(TYLENOL) 325 MG tablet Take 650 mg by mouth 3 times daily    aspirin 81 MG EC tablet Take 81 mg by mouth daily    atorvastatin (LIPITOR) 80 MG tablet Take 80 mg by mouth at bedtime    clopidogrel (PLAVIX) 75 MG tablet Take 75 mg by mouth daily    isosorbide dinitrate (ISORDIL) 5 MG tablet Take 5 mg by mouth 3 times daily    metoprolol tartrate (LOPRESSOR) 25 MG tablet Take 25 mg by mouth 2 times daily    mirtazapine (REMERON) 15 MG tablet Take 15 mg by mouth at bedtime    nitroGLYcerin (NITROSTAT) 0.4 MG sublingual tablet Place 0.4 mg under the tongue every 5 minutes as needed for chest pain For chest pain place 1 tablet under the tongue every 5 minutes for 3 doses. If symptoms persist 5 minutes after 1st dose call 911.    QUEtiapine (SEROQUEL) 25 MG tablet Take 12.5 mg by mouth 2 times daily    Suvorexant (BELSOMRA) 10 MG tablet Take 10 mg by mouth nightly as needed for sleep     No current facility-administered medications for this visit.       REVIEW OF SYSTEMS:   10 point review of systems reviewed and pertinent positives in the HPI.     PHYSICAL EXAMINATION:  Physical Exam     Vital signs: BP (!) 146/84   Pulse 89   Temp 97.3  F (36.3  C)   Resp 18   Ht 1.778 m (5' 10\")   Wt 72.3 kg (159 lb 6.4 oz)   SpO2 98%   BMI 22.87 kg/m    General: Awake, Alert, oriented x2, lying in bed, follows simple commands at times   HEENT:Pink conjunctiva, dry oral mucosa, left side facial droop. Speech clear.   CARDIOVASCULAR: S1. S2 without murmur or gallop.   RESPIRATORY: No wheezes, rales or rhonchi.   NECK: Supple  BACK: No kyphosis of the thoracic spine  EXTREMITIES: Left side hemiparesis, no pedal edema  SKIN: Warm and dry  NEUROLOGIC: See above, pulses palpable  PSYCHIATRIC: Flat affect.       Labs:  All labs reviewed in the nursing home record and Epic       This note has been dictated using voice recognition software. Any grammatical or context distortions are unintentional and inherent to the " software    Electronically signed by: Essence Conteh, CNP

## 2024-01-27 ENCOUNTER — TELEPHONE (OUTPATIENT)
Dept: GERIATRICS | Facility: CLINIC | Age: 74
End: 2024-01-27
Payer: COMMERCIAL

## 2024-01-27 NOTE — PROGRESS NOTES
Patient post CVA, having behaviors not managed on seroquel 12.5 mg daily. Family present and requesting more medications.       Plan  Seroquel 25 mg P.O. BID and seroquel 25 mg P.O. daily PRN.     Anitha griffith, CNP

## 2024-01-30 ENCOUNTER — TRANSITIONAL CARE UNIT VISIT (OUTPATIENT)
Dept: GERIATRICS | Facility: CLINIC | Age: 74
End: 2024-01-30
Payer: COMMERCIAL

## 2024-01-30 VITALS
RESPIRATION RATE: 18 BRPM | WEIGHT: 158.8 LBS | BODY MASS INDEX: 22.73 KG/M2 | HEART RATE: 78 BPM | HEIGHT: 70 IN | TEMPERATURE: 96.7 F | OXYGEN SATURATION: 98 % | DIASTOLIC BLOOD PRESSURE: 83 MMHG | SYSTOLIC BLOOD PRESSURE: 142 MMHG

## 2024-01-30 DIAGNOSIS — R25.2 SPASTICITY AS LATE EFFECT OF CEREBROVASCULAR ACCIDENT (CVA): ICD-10-CM

## 2024-01-30 DIAGNOSIS — I69.398 SPASTICITY AS LATE EFFECT OF CEREBROVASCULAR ACCIDENT (CVA): ICD-10-CM

## 2024-01-30 DIAGNOSIS — I25.118 CORONARY ARTERY DISEASE OF NATIVE ARTERY OF NATIVE HEART WITH STABLE ANGINA PECTORIS (H): ICD-10-CM

## 2024-01-30 DIAGNOSIS — F32.0 CURRENT MILD EPISODE OF MAJOR DEPRESSIVE DISORDER, UNSPECIFIED WHETHER RECURRENT (H): ICD-10-CM

## 2024-01-30 DIAGNOSIS — I63.531 ACUTE ISCHEMIC RIGHT POSTERIOR CEREBRAL ARTERY (PCA) STROKE (H): Primary | ICD-10-CM

## 2024-01-30 DIAGNOSIS — E11.00 TYPE 2 DIABETES MELLITUS WITH HYPEROSMOLARITY WITHOUT COMA, UNSPECIFIED WHETHER LONG TERM INSULIN USE (H): ICD-10-CM

## 2024-01-30 DIAGNOSIS — R45.1 AGITATION: ICD-10-CM

## 2024-01-30 PROCEDURE — 99309 SBSQ NF CARE MODERATE MDM 30: CPT | Performed by: NURSE PRACTITIONER

## 2024-01-30 RX ORDER — TIZANIDINE HYDROCHLORIDE 2 MG/1
2 CAPSULE, GELATIN COATED ORAL DAILY
COMMUNITY
End: 2024-02-01

## 2024-01-30 NOTE — PROGRESS NOTES
[facility] GERIATRIC SERVICES    Code Status:  DNR   Visit Type:   Chief Complaint   Patient presents with    TCU Follow Up     Facility:  [facility] ([facility]) [[id number]]           HPI: Shaji Markham is a 73 year old male who I am seeing today for follow up on the TCU. 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  Patient hospitalized on 1/4/2024 due to left-sided weakness and right gaze following cardiac catheterization.  Patient underwent a coronary angiogram on 1/4/2024 in the Cath Lab at [facility].  He received a heparin bolus.  After the procedure was done staff noted the patient exhibiting right gaze deviation.  He also endorsed left Heema body numbness and weakness he was transported to the ED accompanied by the neurologist.  In the ED tPA not indicated as patient had already received heparin.  CT was not indicated of LVO so no endovascular treatment was pursued.  Patient given aspirin suppository.  MRI of the brain showed restricted diffusion defect in the right hippocampal body and tail likely representing acute ischemia.  Repeat MRI of the brain 1/5/2024 demonstrated increased size of the recent right PCA territory infarct involving the hippocampus and occipital lobe with mild localized mass effect.  Possible trace SAH overlying the right middle lobe.  EEG negative.  Vascular surgery consulted for carotid disease.  Severe greater than 70% stenosis of bilateral carotid arteries.  Vascular surgery recommended aspirin with possible carotid artery revascularization in the future.  DAPT and statin therapy.  Vascular recommended 3-month follow-up with repeat carotid artery ultrasound.  Cardiothoracic surgery following.  Patient underwent coronary angiogram on 1/4/2024 which showed multivessel CAD including 70% proximal LAD disease.  CT surgery  recommended CABG versus complex percutaneous coronary intervention.  Neurology recommended at least 4 weeks before considering any intervention.  Patient with suicidal thoughts and ideation during hospitalization.  Psychiatric consulted.  Patient found to have a general disorder with depressed mood.  Worsened left-sided weakness on 1/9/2024.  Repeat head CT showed evolving stroke.  Patient with dense left hemiplegia. Therapy reporting occ. Increased spasticity.     Transitional Care Course: Patient is up in wheelchair visiting with family before exam. Following a recent CVA with left sided lamar. Previous agitation is now adequately managed with Seroquel BID. He feels his appetite is improving a little bit, and denies feeling any pain or spasms. He has contractures in both left-sided extremities but denies any new changes to his mobility.    Reviewed and also add:  Recent increased agitation, including running wheelchair into wall, kicking wall and turning over items in dining area. On call notified over the weekend and Seroquel increased to 25 mg BID. Pt seems more calm today. Pt also started on Tylenol  650 mg TID late last week. Pt denies any pain today on exam however family reporting pain symptoms. Therapy also reporting increased spasticity during therapy. Today I noted spasticity on RLE when examining sensation on LLE.     Assessment/Plan:     Acute ischemic right posterior cerebral artery (PCA) stroke   Hemiparesis affecting left side as late effect of cerebrovascular accident (CVA)   History of CVA due to occlusion of the left posterior cerebral artery  -Continue Plavix, aspirin and statin.  -Brain MRI showed right PCA stroke.  -Video EEG with no seizure.  -Increase Tylenol 650 mg TID.   -increased spasticity; start tizanidine 2 mg daily, monitor     Dysphagia  -Diet downgraded last week to pureed for ease of chewing. Continues on thickened liquids.   -FEES test showed no aspiration noted across trials but  penetration noted on large sip of thin liquids.   -Diet down graded to IDDSI 4 with thin liquids with 1:1 supervision.   -High Risk for aspiration.   -ST following.    Agitation  -stroke induced.   -Seroquel increased to 25 mg BID.   -ok for ACP.     Depression   -Continue Remeron 15 mg at bedtime.   -pt with suicidal ideation in hospital.   -Ok for ACP.   -Monitor for symptoms.     DANIA on CKD, stage 3.   -Baseline creatinine 1.20-1.7.  -Creatine 1.64.   -Additional 240 ml with each med pass.   -assist with all meals.   -Follow up BMP on Thursday.     Coronary artery disease of native artery of native heart with stable angina pectoris (H24)  -Status post coronary angiogram on 1/4/2024 revealing multivessel coronary artery disease.  -CV surgery following.  Initially CABG was recommended now cardiology is planning possible PCI.   -Hold off for 4 weeks per neurology recommendations  -Continue aspirin and statin.  -Echocardiogram showed ejection fracture of 59%.  -Continue Isosorbide.     Type 2 diabetes mellitus with hyperosmolarity without coma, unspecified whether long term insulin use (H)  -Consistent carb diet.  -Hemoglobin A1c 6.5% on 1/4/2024.  -Previously on metformin, Farxiga and Ozempic.  Discontinued during hospitalization.  -Blood sugar checks twice daily.   -Offer at bedtime snack.      Active Ambulatory Problems     Diagnosis Date Noted    Abnormal stress test 01/04/2024    Acute ischemic right posterior cerebral artery (PCA) stroke (H) 01/04/2024    Cerebrovascular accident (CVA) due to occlusion of left posterior cerebral artery (H) 07/04/2022    Type 2 diabetes mellitus (H) 02/16/2017    Tinnitus, bilateral 04/04/2022    Stage 3b chronic kidney disease (H) 03/13/2021    Onychomycosis of right great toe 04/05/2023    NAFLD (nonalcoholic fatty liver disease) 10/09/2019    Mixed dyslipidemia 05/05/2011    Elevated lipoprotein(a) 04/06/2022    Coronary artery disease of native artery of native heart with  "stable angina pectoris (H24) 01/04/2024    Visceral obesity 11/13/2020    Periodontitis 12/06/2023     Resolved Ambulatory Problems     Diagnosis Date Noted    No Resolved Ambulatory Problems     No Additional Past Medical History     Allergies   Allergen Reactions    Latex Hives and Rash    Anesthetics, Lorna Rash       All Meds and Allergies reviewed in the record at the facility and is the most up-to-date.    Current Outpatient Medications   Medication Sig    acetaminophen (TYLENOL) 325 MG tablet Take 650 mg by mouth 3 times daily    aspirin 81 MG EC tablet Take 81 mg by mouth daily    atorvastatin (LIPITOR) 80 MG tablet Take 80 mg by mouth at bedtime    clopidogrel (PLAVIX) 75 MG tablet Take 75 mg by mouth daily    isosorbide dinitrate (ISORDIL) 5 MG tablet Take 5 mg by mouth 3 times daily    metoprolol tartrate (LOPRESSOR) 25 MG tablet Take 25 mg by mouth 2 times daily    mirtazapine (REMERON) 15 MG tablet Take 15 mg by mouth at bedtime    nitroGLYcerin (NITROSTAT) 0.4 MG sublingual tablet Place 0.4 mg under the tongue every 5 minutes as needed for chest pain For chest pain place 1 tablet under the tongue every 5 minutes for 3 doses. If symptoms persist 5 minutes after 1st dose call 911.    QUEtiapine (SEROQUEL) 25 MG tablet Take 12.5 mg by mouth 2 times daily    Suvorexant (BELSOMRA) 10 MG tablet Take 10 mg by mouth nightly as needed for sleep     No current facility-administered medications for this visit.       REVIEW OF SYSTEMS:   10 point review of systems reviewed and pertinent positives in the HPI.     PHYSICAL EXAMINATION:  Physical Exam     Vital signs: BP (!) 142/83   Pulse 78   Temp (!) 96.7  F (35.9  C)   Resp 18   Ht 1.778 m (5' 10\")   Wt 72 kg (158 lb 12.8 oz)   SpO2 98%   BMI 22.79 kg/m    General: Awake, Alert, oriented x2, lying in bed, follows simple commands at times   HEENT:Pink conjunctiva, dry oral mucosa, left side facial droop. Speech clear.   CARDIOVASCULAR: S1. S2 without murmur " or gallop.   RESPIRATORY: No wheezes, rales or rhonchi.   NECK: Supple  BACK: No kyphosis of the thoracic spine  EXTREMITIES: Left side hemiparesis, no pedal edema  SKIN: Warm and dry  NEUROLOGIC: See above, pulses palpable. Increased spasticity to RLE when testing sensation in LLE.   PSYCHIATRIC: Flat affect.       Labs:  All labs reviewed in the nursing home record and Epic     I was present with the medical student/advanced practice registered nurse, Monique Burrell, NP student, who participated in the service and in the documentation of this note. I have verified the history and personally performed the physical exam and medical decision making. I agree with the assessment and plan of care as documented in the note.       This note has been dictated using voice recognition software. Any grammatical or context distortions are unintentional and inherent to the software    Electronically signed by: Essence Conteh CNP

## 2024-01-30 NOTE — LETTER
1/30/2024        RE: Shaji Markham  [address] [facility] [id number]        [facility] GERIATRIC SERVICES    Code Status:  DNR   Visit Type:   Chief Complaint   Patient presents with     TCU Follow Up     Facility:  [facility]) [[id number]]           HPI: Shaji Markham is a 73 year old male who I am seeing today for follow up on the TCU. 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  Patient hospitalized on 1/4/2024 due to left-sided weakness and right gaze following cardiac catheterization.  Patient underwent a coronary angiogram on 1/4/2024 in the Cath Lab at [facility].  He received a heparin bolus.  After the procedure was done staff noted the patient exhibiting right gaze deviation.  He also endorsed left Heema body numbness and weakness he was transported to the ED accompanied by the neurologist.  In the ED tPA not indicated as patient had already received heparin.  CT was not indicated of LVO so no endovascular treatment was pursued.  Patient given aspirin suppository.  MRI of the brain showed restricted diffusion defect in the right hippocampal body and tail likely representing acute ischemia.  Repeat MRI of the brain 1/5/2024 demonstrated increased size of the recent right PCA territory infarct involving the hippocampus and occipital lobe with mild localized mass effect.  Possible trace SAH overlying the right middle lobe.  EEG negative.  Vascular surgery consulted for carotid disease.  Severe greater than 70% stenosis of bilateral carotid arteries.  Vascular surgery recommended aspirin with possible carotid artery revascularization in the future.  DAPT and statin therapy.  Vascular recommended 3-month follow-up with repeat carotid artery ultrasound.  Cardiothoracic surgery following.  Patient underwent coronary angiogram on 1/4/2024  which showed multivessel CAD including 70% proximal LAD disease.  CT surgery recommended CABG versus complex percutaneous coronary intervention.  Neurology recommended at least 4 weeks before considering any intervention.  Patient with suicidal thoughts and ideation during hospitalization.  Psychiatric consulted.  Patient found to have a general disorder with depressed mood.  Worsened left-sided weakness on 1/9/2024.  Repeat head CT showed evolving stroke.  Patient with dense left hemiplegia. Therapy reporting occ. Increased spasticity.     Transitional Care Course: Patient is up in wheelchair visiting with family before exam. Following a recent CVA with left sided lamar. Previous agitation is now adequately managed with Seroquel BID. He feels his appetite is improving a little bit, and denies feeling any pain or spasms. He has contractures in both left-sided extremities but denies any new changes to his mobility.    Reviewed and also add:  Recent increased agitation, including running wheelchair into wall, kicking wall and turning over items in dining area. On call notified over the weekend and Seroquel increased to 25 mg BID. Pt seems more calm today. Pt also started on Tylenol  650 mg TID late last week. Pt denies any pain today on exam however family reporting pain symptoms. Therapy also reporting increased spasticity during therapy. Today I noted spasticity on RLE when examining sensation on LLE.     Assessment/Plan:     Acute ischemic right posterior cerebral artery (PCA) stroke   Hemiparesis affecting left side as late effect of cerebrovascular accident (CVA)   History of CVA due to occlusion of the left posterior cerebral artery  -Continue Plavix, aspirin and statin.  -Brain MRI showed right PCA stroke.  -Video EEG with no seizure.  -Increase Tylenol 650 mg TID.   -increased spasticity; start tizanidine 2 mg daily, monitor     Dysphagia  -Diet downgraded last week to pureed for ease of chewing. Continues on  thickened liquids.   -FEES test showed no aspiration noted across trials but penetration noted on large sip of thin liquids.   -Diet down graded to IDDSI 4 with thin liquids with 1:1 supervision.   -High Risk for aspiration.   -ST following.    Agitation  -stroke induced.   -Seroquel increased to 25 mg BID.   -ok for ACP.     Depression   -Continue Remeron 15 mg at bedtime.   -pt with suicidal ideation in hospital.   -Ok for ACP.   -Monitor for symptoms.     DANIA on CKD, stage 3.   -Baseline creatinine 1.20-1.7.  -Creatine 1.64.   -Additional 240 ml with each med pass.   -assist with all meals.   -Follow up BMP on Thursday.     Coronary artery disease of native artery of native heart with stable angina pectoris (H24)  -Status post coronary angiogram on 1/4/2024 revealing multivessel coronary artery disease.  -CV surgery following.  Initially CABG was recommended now cardiology is planning possible PCI.   -Hold off for 4 weeks per neurology recommendations  -Continue aspirin and statin.  -Echocardiogram showed ejection fracture of 59%.  -Continue Isosorbide.     Type 2 diabetes mellitus with hyperosmolarity without coma, unspecified whether long term insulin use (H)  -Consistent carb diet.  -Hemoglobin A1c 6.5% on 1/4/2024.  -Previously on metformin, Farxiga and Ozempic.  Discontinued during hospitalization.  -Blood sugar checks twice daily.   -Offer at bedtime snack.      Active Ambulatory Problems     Diagnosis Date Noted     Abnormal stress test 01/04/2024     Acute ischemic right posterior cerebral artery (PCA) stroke (H) 01/04/2024     Cerebrovascular accident (CVA) due to occlusion of left posterior cerebral artery (H) 07/04/2022     Type 2 diabetes mellitus (H) 02/16/2017     Tinnitus, bilateral 04/04/2022     Stage 3b chronic kidney disease (H) 03/13/2021     Onychomycosis of right great toe 04/05/2023     NAFLD (nonalcoholic fatty liver disease) 10/09/2019     Mixed dyslipidemia 05/05/2011     Elevated  "lipoprotein(a) 04/06/2022     Coronary artery disease of native artery of native heart with stable angina pectoris (H24) 01/04/2024     Visceral obesity 11/13/2020     Periodontitis 12/06/2023     Resolved Ambulatory Problems     Diagnosis Date Noted     No Resolved Ambulatory Problems     No Additional Past Medical History     Allergies   Allergen Reactions     Latex Hives and Rash     Anesthetics, Lrona Rash       All Meds and Allergies reviewed in the record at the facility and is the most up-to-date.    Current Outpatient Medications   Medication Sig     acetaminophen (TYLENOL) 325 MG tablet Take 650 mg by mouth 3 times daily     aspirin 81 MG EC tablet Take 81 mg by mouth daily     atorvastatin (LIPITOR) 80 MG tablet Take 80 mg by mouth at bedtime     clopidogrel (PLAVIX) 75 MG tablet Take 75 mg by mouth daily     isosorbide dinitrate (ISORDIL) 5 MG tablet Take 5 mg by mouth 3 times daily     metoprolol tartrate (LOPRESSOR) 25 MG tablet Take 25 mg by mouth 2 times daily     mirtazapine (REMERON) 15 MG tablet Take 15 mg by mouth at bedtime     nitroGLYcerin (NITROSTAT) 0.4 MG sublingual tablet Place 0.4 mg under the tongue every 5 minutes as needed for chest pain For chest pain place 1 tablet under the tongue every 5 minutes for 3 doses. If symptoms persist 5 minutes after 1st dose call 911.     QUEtiapine (SEROQUEL) 25 MG tablet Take 12.5 mg by mouth 2 times daily     Suvorexant (BELSOMRA) 10 MG tablet Take 10 mg by mouth nightly as needed for sleep     No current facility-administered medications for this visit.       REVIEW OF SYSTEMS:   10 point review of systems reviewed and pertinent positives in the HPI.     PHYSICAL EXAMINATION:  Physical Exam     Vital signs: BP (!) 142/83   Pulse 78   Temp (!) 96.7  F (35.9  C)   Resp 18   Ht 1.778 m (5' 10\")   Wt 72 kg (158 lb 12.8 oz)   SpO2 98%   BMI 22.79 kg/m    General: Awake, Alert, oriented x2, lying in bed, follows simple commands at times   HEENT:Pink " conjunctiva, dry oral mucosa, left side facial droop. Speech clear.   CARDIOVASCULAR: S1. S2 without murmur or gallop.   RESPIRATORY: No wheezes, rales or rhonchi.   NECK: Supple  BACK: No kyphosis of the thoracic spine  EXTREMITIES: Left side hemiparesis, no pedal edema  SKIN: Warm and dry  NEUROLOGIC: See above, pulses palpable. Increased spasticity to RLE when testing sensation in LLE.   PSYCHIATRIC: Flat affect.       Labs:  All labs reviewed in the nursing home record and Epic     I was present with the medical student/advanced practice registered nurse, Monique Burrell NP student, who participated in the service and in the documentation of this note. I have verified the history and personally performed the physical exam and medical decision making. I agree with the assessment and plan of care as documented in the note.       This note has been dictated using voice recognition software. Any grammatical or context distortions are unintentional and inherent to the software    Electronically signed by: Essence Conteh CNP       Sincerely,        Essence Conteh NP

## 2024-01-31 ENCOUNTER — LAB REQUISITION (OUTPATIENT)
Dept: LAB | Facility: CLINIC | Age: 74
End: 2024-01-31
Payer: COMMERCIAL

## 2024-01-31 DIAGNOSIS — N17.9 ACUTE KIDNEY FAILURE, UNSPECIFIED (H): ICD-10-CM

## 2024-01-31 DIAGNOSIS — I25.118 ATHEROSCLEROTIC HEART DISEASE OF NATIVE CORONARY ARTERY WITH OTHER FORMS OF ANGINA PECTORIS (H): ICD-10-CM

## 2024-01-31 DIAGNOSIS — I69.30 UNSPECIFIED SEQUELAE OF CEREBRAL INFARCTION: ICD-10-CM

## 2024-02-01 ENCOUNTER — TRANSITIONAL CARE UNIT VISIT (OUTPATIENT)
Dept: GERIATRICS | Facility: CLINIC | Age: 74
End: 2024-02-01
Payer: COMMERCIAL

## 2024-02-01 VITALS
RESPIRATION RATE: 20 BRPM | HEIGHT: 70 IN | BODY MASS INDEX: 22.73 KG/M2 | SYSTOLIC BLOOD PRESSURE: 110 MMHG | HEART RATE: 70 BPM | OXYGEN SATURATION: 85 % | DIASTOLIC BLOOD PRESSURE: 68 MMHG | WEIGHT: 158.8 LBS | TEMPERATURE: 97.8 F

## 2024-02-01 DIAGNOSIS — G58.9 MONONEUROPATHY: ICD-10-CM

## 2024-02-01 DIAGNOSIS — R13.10 DYSPHAGIA, UNSPECIFIED TYPE: ICD-10-CM

## 2024-02-01 DIAGNOSIS — I25.118 CORONARY ARTERY DISEASE OF NATIVE ARTERY OF NATIVE HEART WITH STABLE ANGINA PECTORIS (H): ICD-10-CM

## 2024-02-01 DIAGNOSIS — N17.9 ACUTE KIDNEY INJURY SUPERIMPOSED ON CKD (H): ICD-10-CM

## 2024-02-01 DIAGNOSIS — I63.531 ACUTE ISCHEMIC RIGHT POSTERIOR CEREBRAL ARTERY (PCA) STROKE (H): Primary | ICD-10-CM

## 2024-02-01 DIAGNOSIS — R45.1 AGITATION: ICD-10-CM

## 2024-02-01 DIAGNOSIS — E11.00 TYPE 2 DIABETES MELLITUS WITH HYPEROSMOLARITY WITHOUT COMA, UNSPECIFIED WHETHER LONG TERM INSULIN USE (H): ICD-10-CM

## 2024-02-01 DIAGNOSIS — N18.9 ACUTE KIDNEY INJURY SUPERIMPOSED ON CKD (H): ICD-10-CM

## 2024-02-01 LAB
ANION GAP SERPL CALCULATED.3IONS-SCNC: 11 MMOL/L (ref 7–15)
BUN SERPL-MCNC: 45.7 MG/DL (ref 8–23)
CALCIUM SERPL-MCNC: 9.2 MG/DL (ref 8.8–10.2)
CHLORIDE SERPL-SCNC: 108 MMOL/L (ref 98–107)
CREAT SERPL-MCNC: 1.84 MG/DL (ref 0.67–1.17)
DEPRECATED HCO3 PLAS-SCNC: 26 MMOL/L (ref 22–29)
EGFRCR SERPLBLD CKD-EPI 2021: 38 ML/MIN/1.73M2
GLUCOSE SERPL-MCNC: 145 MG/DL (ref 70–99)
POTASSIUM SERPL-SCNC: 3.8 MMOL/L (ref 3.4–5.3)
SODIUM SERPL-SCNC: 145 MMOL/L (ref 135–145)

## 2024-02-01 PROCEDURE — 99309 SBSQ NF CARE MODERATE MDM 30: CPT | Performed by: NURSE PRACTITIONER

## 2024-02-01 PROCEDURE — 36415 COLL VENOUS BLD VENIPUNCTURE: CPT | Mod: ORL | Performed by: NURSE PRACTITIONER

## 2024-02-01 PROCEDURE — P9604 ONE-WAY ALLOW PRORATED TRIP: HCPCS | Mod: ORL | Performed by: NURSE PRACTITIONER

## 2024-02-01 PROCEDURE — 80048 BASIC METABOLIC PNL TOTAL CA: CPT | Mod: ORL | Performed by: NURSE PRACTITIONER

## 2024-02-01 RX ORDER — GABAPENTIN 100 MG/1
100 CAPSULE ORAL 2 TIMES DAILY
COMMUNITY

## 2024-02-01 NOTE — LETTER
2/1/2024        RE: Shaji Markham  [address] [facility] [id number]        [facility] GERIATRIC SERVICES    Code Status:  DNR   Visit Type:   Chief Complaint   Patient presents with     TCU Follow Up     Facility:  [facility]) [[id number]]           HPI: Shaji Markham is a 73 year old male who I am seeing today for follow up on the TCU. 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  Patient hospitalized on 1/4/2024 due to left-sided weakness and right gaze following cardiac catheterization.  Patient underwent a coronary angiogram on 1/4/2024 in the Cath Lab at [facility].  He received a heparin bolus.  After the procedure was done staff noted the patient exhibiting right gaze deviation.  He also endorsed left Heema body numbness and weakness he was transported to the ED accompanied by the neurologist.  In the ED tPA not indicated as patient had already received heparin.  CT was not indicated of LVO so no endovascular treatment was pursued.  Patient given aspirin suppository.  MRI of the brain showed restricted diffusion defect in the right hippocampal body and tail likely representing acute ischemia.  Repeat MRI of the brain 1/5/2024 demonstrated increased size of the recent right PCA territory infarct involving the hippocampus and occipital lobe with mild localized mass effect.  Possible trace SAH overlying the right middle lobe.  EEG negative.  Vascular surgery consulted for carotid disease.  Severe greater than 70% stenosis of bilateral carotid arteries.  Vascular surgery recommended aspirin with possible carotid artery revascularization in the future.  DAPT and statin therapy.  Vascular recommended 3-month follow-up with repeat carotid artery ultrasound.  Cardiothoracic surgery following.  Patient underwent coronary angiogram on 1/4/2024  "which showed multivessel CAD including 70% proximal LAD disease.  CT surgery recommended CABG versus complex percutaneous coronary intervention.  Neurology recommended at least 4 weeks before considering any intervention.  Patient with suicidal thoughts and ideation during hospitalization.  Psychiatric consulted.  Patient found to have a general disorder with depressed mood.  Worsened left-sided weakness on 1/9/2024.  Repeat head CT showed evolving stroke.  Patient with dense left hemiplegia. Therapy reporting occ. Increased spasticity.     Transitional Care Course: Today patient sitting up in wheelchair.  He is very agitated.  Just prior to my visit nursing staff report patient turning over things in the dining room.  Kicking walls again.  He is attempted to take out the dresser drawers in his room.  One-on-one attempted however unsuccessful.  Patient continues on Seroquel 25 twice daily.  Today he reports \" if he had a car he would run off the bridge.\"  He expresses anxiety and depressive symptoms over his recent CVA.  He is constantly bobbing his right knee up and down during my visit.  When asking about pain.  He reports of burning in his left lower extremity.  He reports that he moves his right knee to combat the pain in his left leg.    I did talk with his daughter today regarding overall prognosis.  Daughter feels he does have pain in his left lower extremity.  She reports he had diabetic neuropathy pain in his lower extremity prior to stroke.  He continues on Tylenol.  She reports the nurse had reported anxiety during hospitalization.  He is on mirtazapine.  We discussed current medications and treatments.  She reported her dad is completely different.  He used to be a very calm man.  We also discussed possible ACP services however patient has declined.  Family is open to this.    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  -Brain MRI showed right PCA stroke.  -Video EEG with no seizure.  -Tylenol 650 mg TID.   -DC tizanidine  -Start gabapentin 200 mg nightly.      Dysphagia  -Diet downgraded last week to pureed for ease of chewing. Continues on thickened liquids.   -FEES test showed no aspiration noted across trials but penetration noted on large sip of thin liquids.   -Diet down graded to IDDSI 4 with thin liquids with 1:1 supervision.   -High Risk for aspiration.   -ST following.    Agitation  -stroke induced.   -Seroquel increased to 25 mg BID.   -ok for ACP.     Depression   -Continue Remeron 15 mg at bedtime.   -pt with suicidal ideation in hospital.  Patient reporting that he would run his car off the bridge.  Suicidal thought present.  Patient has no means of doing this.  -Ok for ACP.   -Monitor for symptoms.     DANIA on CKD, stage 3.   -Baseline creatinine 1.20-1.7.  -Follow up BMP with creatinine of 1.84.  -Additional 240 ml with each med pass.   -assist with all meals.     Coronary artery disease of native artery of native heart with stable angina pectoris (H24)  -Status post coronary angiogram on 1/4/2024 revealing multivessel coronary artery disease.  -CV surgery following.  Initially CABG was recommended now cardiology is planning possible PCI.   -Hold off for 4 weeks per neurology recommendations  -Continue aspirin and statin.  -Echocardiogram showed ejection fracture of 59%.  -Continue Isosorbide.     Type 2 diabetes mellitus with hyperosmolarity without coma, unspecified whether long term insulin use (H)  -Consistent carb diet.  -Hemoglobin A1c 6.5% on 1/4/2024.  -Previously on metformin, Farxiga and Ozempic.  Discontinued during hospitalization.  -Blood sugar checks twice daily.   -Offer at bedtime snack.      Active Ambulatory Problems     Diagnosis  Date Noted     Abnormal stress test 01/04/2024     Acute ischemic right posterior cerebral artery (PCA) stroke (H) 01/04/2024     Cerebrovascular accident (CVA) due to occlusion of left posterior cerebral artery (H) 07/04/2022     Type 2 diabetes mellitus (H) 02/16/2017     Tinnitus, bilateral 04/04/2022     Stage 3b chronic kidney disease (H) 03/13/2021     Onychomycosis of right great toe 04/05/2023     NAFLD (nonalcoholic fatty liver disease) 10/09/2019     Mixed dyslipidemia 05/05/2011     Elevated lipoprotein(a) 04/06/2022     Coronary artery disease of native artery of native heart with stable angina pectoris (H24) 01/04/2024     Visceral obesity 11/13/2020     Periodontitis 12/06/2023     Resolved Ambulatory Problems     Diagnosis Date Noted     No Resolved Ambulatory Problems     No Additional Past Medical History     Allergies   Allergen Reactions     Latex Hives and Rash     Anesthetics, Lorna Rash       All Meds and Allergies reviewed in the record at the facility and is the most up-to-date.    Current Outpatient Medications   Medication Sig     gabapentin (NEURONTIN) 100 MG capsule Take 200 mg by mouth at bedtime     acetaminophen (TYLENOL) 325 MG tablet Take 650 mg by mouth 3 times daily     aspirin 81 MG EC tablet Take 81 mg by mouth daily     atorvastatin (LIPITOR) 80 MG tablet Take 80 mg by mouth at bedtime     clopidogrel (PLAVIX) 75 MG tablet Take 75 mg by mouth daily     isosorbide dinitrate (ISORDIL) 5 MG tablet Take 5 mg by mouth 3 times daily     metoprolol tartrate (LOPRESSOR) 25 MG tablet Take 25 mg by mouth 2 times daily     mirtazapine (REMERON) 15 MG tablet Take 15 mg by mouth at bedtime     nitroGLYcerin (NITROSTAT) 0.4 MG sublingual tablet Place 0.4 mg under the tongue every 5 minutes as needed for chest pain For chest pain place 1 tablet under the tongue every 5 minutes for 3 doses. If symptoms persist 5 minutes after 1st dose call 911.     QUEtiapine (SEROQUEL) 25 MG tablet Take 25 mg  "by mouth 2 times daily     Suvorexant (BELSOMRA) 10 MG tablet Take 10 mg by mouth nightly as needed for sleep     No current facility-administered medications for this visit.       REVIEW OF SYSTEMS:   10 point review of systems reviewed and pertinent positives in the HPI.     PHYSICAL EXAMINATION:  Physical Exam     Vital signs: /68   Pulse 70   Temp 97.8  F (36.6  C)   Resp 20   Ht 1.778 m (5' 10\")   Wt 72 kg (158 lb 12.8 oz)   SpO2 (!) 85%   BMI 22.79 kg/m    General: Awake, Alert, oriented x2, sitting up in wheelchair follows simple commands at times   HEENT:Pink conjunctiva, dry oral mucosa, left side facial droop. Speech clear.   NECK: Supple  BACK: No kyphosis of the thoracic spine  EXTREMITIES: Left side hemiparesis, no pedal edema.  Moving right knee up and down and fast motion continuously.  SKIN: Warm and dry  NEUROLOGIC: See above, pulses palpable.  PSYCHIATRIC: Flat affect.       Labs:  All labs reviewed in the nursing home record and Epic     This note has been dictated using voice recognition software. Any grammatical or context distortions are unintentional and inherent to the software    Electronically signed by: Essence Conteh CNP       Sincerely,        Essence Conteh NP      "

## 2024-02-02 NOTE — PROGRESS NOTES
[facility] GERIATRIC SERVICES    Code Status:  DNR   Visit Type:   Chief Complaint   Patient presents with    TCU Follow Up     Facility:  [facility] ([facility]) [[id number]]           HPI: Shaji Markham is a 73 year old male who I am seeing today for follow up on the TCU. 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  Patient hospitalized on 1/4/2024 due to left-sided weakness and right gaze following cardiac catheterization.  Patient underwent a coronary angiogram on 1/4/2024 in the Cath Lab at [facility].  He received a heparin bolus.  After the procedure was done staff noted the patient exhibiting right gaze deviation.  He also endorsed left Heema body numbness and weakness he was transported to the ED accompanied by the neurologist.  In the ED tPA not indicated as patient had already received heparin.  CT was not indicated of LVO so no endovascular treatment was pursued.  Patient given aspirin suppository.  MRI of the brain showed restricted diffusion defect in the right hippocampal body and tail likely representing acute ischemia.  Repeat MRI of the brain 1/5/2024 demonstrated increased size of the recent right PCA territory infarct involving the hippocampus and occipital lobe with mild localized mass effect.  Possible trace SAH overlying the right middle lobe.  EEG negative.  Vascular surgery consulted for carotid disease.  Severe greater than 70% stenosis of bilateral carotid arteries.  Vascular surgery recommended aspirin with possible carotid artery revascularization in the future.  DAPT and statin therapy.  Vascular recommended 3-month follow-up with repeat carotid artery ultrasound.  Cardiothoracic surgery following.  Patient underwent coronary angiogram on 1/4/2024 which showed multivessel CAD including 70% proximal LAD disease.  CT surgery  "recommended CABG versus complex percutaneous coronary intervention.  Neurology recommended at least 4 weeks before considering any intervention.  Patient with suicidal thoughts and ideation during hospitalization.  Psychiatric consulted.  Patient found to have a general disorder with depressed mood.  Worsened left-sided weakness on 1/9/2024.  Repeat head CT showed evolving stroke.  Patient with dense left hemiplegia. Therapy reporting occ. Increased spasticity.     Transitional Care Course: Today patient sitting up in wheelchair.  He is very agitated.  Just prior to my visit nursing staff report patient turning over things in the dining room.  Kicking walls again.  He is attempted to take out the dresser drawers in his room.  One-on-one attempted however unsuccessful.  Patient continues on Seroquel 25 twice daily.  Today he reports \" if he had a car he would run off the bridge.\"  He expresses anxiety and depressive symptoms over his recent CVA.  He is constantly bobbing his right knee up and down during my visit.  When asking about pain.  He reports of burning in his left lower extremity.  He reports that he moves his right knee to combat the pain in his left leg.    I did talk with his daughter today regarding overall prognosis.  Daughter feels he does have pain in his left lower extremity.  She reports he had diabetic neuropathy pain in his lower extremity prior to stroke.  He continues on Tylenol.  She reports the nurse had reported anxiety during hospitalization.  He is on mirtazapine.  We discussed current medications and treatments.  She reported her dad is completely different.  He used to be a very calm man.  We also discussed possible ACP services however patient has declined.  Family is open to this.    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  -Brain MRI showed right PCA stroke.  -Video EEG with no seizure.  -Tylenol 650 mg TID.   -DC tizanidine  -Start gabapentin 200 mg nightly.      Dysphagia  -Diet downgraded last week to pureed for ease of chewing. Continues on thickened liquids.   -FEES test showed no aspiration noted across trials but penetration noted on large sip of thin liquids.   -Diet down graded to IDDSI 4 with thin liquids with 1:1 supervision.   -High Risk for aspiration.   -ST following.    Agitation  -stroke induced.   -Seroquel increased to 25 mg BID.   -ok for ACP.     Depression   -Continue Remeron 15 mg at bedtime.   -pt with suicidal ideation in hospital.  Patient reporting that he would run his car off the bridge.  Suicidal thought present.  Patient has no means of doing this.  -Ok for ACP.   -Monitor for symptoms.     DANIA on CKD, stage 3.   -Baseline creatinine 1.20-1.7.  -Follow up BMP with creatinine of 1.84.  -Additional 240 ml with each med pass.   -assist with all meals.     Coronary artery disease of native artery of native heart with stable angina pectoris (H24)  -Status post coronary angiogram on 1/4/2024 revealing multivessel coronary artery disease.  -CV surgery following.  Initially CABG was recommended now cardiology is planning possible PCI.   -Hold off for 4 weeks per neurology recommendations  -Continue aspirin and statin.  -Echocardiogram showed ejection fracture of 59%.  -Continue Isosorbide.     Type 2 diabetes mellitus with hyperosmolarity without coma, unspecified whether long term insulin use (H)  -Consistent carb diet.  -Hemoglobin A1c 6.5% on 1/4/2024.  -Previously on metformin, Farxiga and Ozempic.  Discontinued during hospitalization.  -Blood sugar checks twice daily.   -Offer at bedtime snack.      Active Ambulatory Problems     Diagnosis Date Noted    Abnormal stress test 01/04/2024    Acute ischemic right  posterior cerebral artery (PCA) stroke (H) 01/04/2024    Cerebrovascular accident (CVA) due to occlusion of left posterior cerebral artery (H) 07/04/2022    Type 2 diabetes mellitus (H) 02/16/2017    Tinnitus, bilateral 04/04/2022    Stage 3b chronic kidney disease (H) 03/13/2021    Onychomycosis of right great toe 04/05/2023    NAFLD (nonalcoholic fatty liver disease) 10/09/2019    Mixed dyslipidemia 05/05/2011    Elevated lipoprotein(a) 04/06/2022    Coronary artery disease of native artery of native heart with stable angina pectoris (H24) 01/04/2024    Visceral obesity 11/13/2020    Periodontitis 12/06/2023     Resolved Ambulatory Problems     Diagnosis Date Noted    No Resolved Ambulatory Problems     No Additional Past Medical History     Allergies   Allergen Reactions    Latex Hives and Rash    Anesthetics, Lorna Rash       All Meds and Allergies reviewed in the record at the facility and is the most up-to-date.    Current Outpatient Medications   Medication Sig    gabapentin (NEURONTIN) 100 MG capsule Take 200 mg by mouth at bedtime    acetaminophen (TYLENOL) 325 MG tablet Take 650 mg by mouth 3 times daily    aspirin 81 MG EC tablet Take 81 mg by mouth daily    atorvastatin (LIPITOR) 80 MG tablet Take 80 mg by mouth at bedtime    clopidogrel (PLAVIX) 75 MG tablet Take 75 mg by mouth daily    isosorbide dinitrate (ISORDIL) 5 MG tablet Take 5 mg by mouth 3 times daily    metoprolol tartrate (LOPRESSOR) 25 MG tablet Take 25 mg by mouth 2 times daily    mirtazapine (REMERON) 15 MG tablet Take 15 mg by mouth at bedtime    nitroGLYcerin (NITROSTAT) 0.4 MG sublingual tablet Place 0.4 mg under the tongue every 5 minutes as needed for chest pain For chest pain place 1 tablet under the tongue every 5 minutes for 3 doses. If symptoms persist 5 minutes after 1st dose call 911.    QUEtiapine (SEROQUEL) 25 MG tablet Take 25 mg by mouth 2 times daily    Suvorexant (BELSOMRA) 10 MG tablet Take 10 mg by mouth nightly as  "needed for sleep     No current facility-administered medications for this visit.       REVIEW OF SYSTEMS:   10 point review of systems reviewed and pertinent positives in the HPI.     PHYSICAL EXAMINATION:  Physical Exam     Vital signs: /68   Pulse 70   Temp 97.8  F (36.6  C)   Resp 20   Ht 1.778 m (5' 10\")   Wt 72 kg (158 lb 12.8 oz)   SpO2 (!) 85%   BMI 22.79 kg/m    General: Awake, Alert, oriented x2, sitting up in wheelchair follows simple commands at times   HEENT:Pink conjunctiva, dry oral mucosa, left side facial droop. Speech clear.   NECK: Supple  BACK: No kyphosis of the thoracic spine  EXTREMITIES: Left side hemiparesis, no pedal edema.  Moving right knee up and down and fast motion continuously.  SKIN: Warm and dry  NEUROLOGIC: See above, pulses palpable.  PSYCHIATRIC: Flat affect.       Labs:  All labs reviewed in the nursing home record and Epic     This note has been dictated using voice recognition software. Any grammatical or context distortions are unintentional and inherent to the software    Electronically signed by: Essence Conteh CNP   "

## 2024-02-06 ENCOUNTER — TRANSITIONAL CARE UNIT VISIT (OUTPATIENT)
Dept: GERIATRICS | Facility: CLINIC | Age: 74
End: 2024-02-06
Payer: COMMERCIAL

## 2024-02-06 ENCOUNTER — LAB REQUISITION (OUTPATIENT)
Dept: LAB | Facility: CLINIC | Age: 74
End: 2024-02-06
Payer: COMMERCIAL

## 2024-02-06 VITALS
OXYGEN SATURATION: 99 % | RESPIRATION RATE: 18 BRPM | SYSTOLIC BLOOD PRESSURE: 138 MMHG | BODY MASS INDEX: 24.69 KG/M2 | DIASTOLIC BLOOD PRESSURE: 84 MMHG | HEIGHT: 70 IN | HEART RATE: 71 BPM | TEMPERATURE: 97.4 F | WEIGHT: 172.5 LBS

## 2024-02-06 DIAGNOSIS — E11.00 TYPE 2 DIABETES MELLITUS WITH HYPEROSMOLARITY WITHOUT COMA, UNSPECIFIED WHETHER LONG TERM INSULIN USE (H): ICD-10-CM

## 2024-02-06 DIAGNOSIS — R53.83 LETHARGY: ICD-10-CM

## 2024-02-06 DIAGNOSIS — N17.9 ACUTE KIDNEY INJURY SUPERIMPOSED ON CKD (H): ICD-10-CM

## 2024-02-06 DIAGNOSIS — I63.531 ACUTE ISCHEMIC RIGHT POSTERIOR CEREBRAL ARTERY (PCA) STROKE (H): Primary | ICD-10-CM

## 2024-02-06 DIAGNOSIS — N18.9 ACUTE KIDNEY INJURY SUPERIMPOSED ON CKD (H): ICD-10-CM

## 2024-02-06 DIAGNOSIS — N17.9 ACUTE KIDNEY FAILURE, UNSPECIFIED (H): ICD-10-CM

## 2024-02-06 DIAGNOSIS — I25.118 CORONARY ARTERY DISEASE OF NATIVE ARTERY OF NATIVE HEART WITH STABLE ANGINA PECTORIS (H): ICD-10-CM

## 2024-02-06 DIAGNOSIS — R53.83 OTHER FATIGUE: ICD-10-CM

## 2024-02-06 LAB
ALBUMIN UR-MCNC: 30 MG/DL
ANION GAP SERPL CALCULATED.3IONS-SCNC: 14 MMOL/L (ref 7–15)
APPEARANCE UR: CLEAR
BILIRUB UR QL STRIP: NEGATIVE
BUN SERPL-MCNC: 17.4 MG/DL (ref 8–23)
CALCIUM SERPL-MCNC: 9.2 MG/DL (ref 8.8–10.2)
CHLORIDE SERPL-SCNC: 102 MMOL/L (ref 98–107)
COLOR UR AUTO: YELLOW
CREAT SERPL-MCNC: 1.31 MG/DL (ref 0.67–1.17)
DEPRECATED HCO3 PLAS-SCNC: 24 MMOL/L (ref 22–29)
EGFRCR SERPLBLD CKD-EPI 2021: 57 ML/MIN/1.73M2
ERYTHROCYTE [DISTWIDTH] IN BLOOD BY AUTOMATED COUNT: 12.5 % (ref 10–15)
GLUCOSE SERPL-MCNC: 113 MG/DL (ref 70–99)
GLUCOSE UR STRIP-MCNC: NEGATIVE MG/DL
HCT VFR BLD AUTO: 41.8 % (ref 40–53)
HGB BLD-MCNC: 13.7 G/DL (ref 13.3–17.7)
HGB UR QL STRIP: NEGATIVE
KETONES UR STRIP-MCNC: NEGATIVE MG/DL
LEUKOCYTE ESTERASE UR QL STRIP: NEGATIVE
MCH RBC QN AUTO: 31.4 PG (ref 26.5–33)
MCHC RBC AUTO-ENTMCNC: 32.8 G/DL (ref 31.5–36.5)
MCV RBC AUTO: 96 FL (ref 78–100)
MUCOUS THREADS #/AREA URNS LPF: PRESENT /LPF
NITRATE UR QL: NEGATIVE
PH UR STRIP: 5 [PH] (ref 5–7)
PLATELET # BLD AUTO: 239 10E3/UL (ref 150–450)
POTASSIUM SERPL-SCNC: 4.9 MMOL/L (ref 3.4–5.3)
RBC # BLD AUTO: 4.37 10E6/UL (ref 4.4–5.9)
RBC URINE: 4 /HPF
SODIUM SERPL-SCNC: 140 MMOL/L (ref 135–145)
SP GR UR STRIP: 1.02 (ref 1–1.03)
UROBILINOGEN UR STRIP-MCNC: NORMAL MG/DL
WBC # BLD AUTO: 7.7 10E3/UL (ref 4–11)
WBC URINE: <1 /HPF

## 2024-02-06 PROCEDURE — 80048 BASIC METABOLIC PNL TOTAL CA: CPT | Mod: ORL | Performed by: NURSE PRACTITIONER

## 2024-02-06 PROCEDURE — 85027 COMPLETE CBC AUTOMATED: CPT | Mod: ORL | Performed by: NURSE PRACTITIONER

## 2024-02-06 PROCEDURE — 87086 URINE CULTURE/COLONY COUNT: CPT | Mod: ORL | Performed by: NURSE PRACTITIONER

## 2024-02-06 PROCEDURE — 81001 URINALYSIS AUTO W/SCOPE: CPT | Mod: ORL | Performed by: NURSE PRACTITIONER

## 2024-02-06 PROCEDURE — 99310 SBSQ NF CARE HIGH MDM 45: CPT | Performed by: NURSE PRACTITIONER

## 2024-02-06 NOTE — LETTER
2/6/2024        RE: Shaji Markham  [address] [facility] [address]        [facility] GERIATRIC SERVICES    Code Status:  DNR   Visit Type:   Chief Complaint   Patient presents with     TCU Follow Up     Facility:  [facility]) [[id number]]           HPI: Shaji Markham is a 73 year old male who I am seeing today for follow up on the TCU. 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  Patient hospitalized on 1/4/2024 due to left-sided weakness and right gaze following cardiac catheterization.  Patient underwent a coronary angiogram on 1/4/2024 in the Cath Lab at [facility].  He received a heparin bolus.  After the procedure was done staff noted the patient exhibiting right gaze deviation.  He also endorsed left Heema body numbness and weakness he was transported to the ED accompanied by the neurologist.  In the ED tPA not indicated as patient had already received heparin.  CT was not indicated of LVO so no endovascular treatment was pursued.  Patient given aspirin suppository.  MRI of the brain showed restricted diffusion defect in the right hippocampal body and tail likely representing acute ischemia.  Repeat MRI of the brain 1/5/2024 demonstrated increased size of the recent right PCA territory infarct involving the hippocampus and occipital lobe with mild localized mass effect.  Possible trace SAH overlying the right middle lobe.  EEG negative.  Vascular surgery consulted for carotid disease.  Severe greater than 70% stenosis of bilateral carotid arteries.  Vascular surgery recommended aspirin with possible carotid artery revascularization in the future.  DAPT and statin therapy.  Vascular recommended 3-month follow-up with repeat carotid artery ultrasound.  Cardiothoracic surgery following.  Patient underwent coronary angiogram on 1/4/2024  which showed multivessel CAD including 70% proximal LAD disease.  CT surgery recommended CABG versus complex percutaneous coronary intervention.  Neurology recommended at least 4 weeks before considering any intervention.  Patient with suicidal thoughts and ideation during hospitalization.  Psychiatric consulted.  Patient found to have a general disorder with depressed mood.  Worsened left-sided weakness on 1/9/2024.  Repeat head CT showed evolving stroke.  Patient with dense left hemiplegia. Therapy reporting occ. Increased spasticity.     Transitional Care Course: Today patient lying in bed. Nursing staff reporting pt lethargic today. Pt not wanting to get OOB. He is arousable. Neuros intact. A febrile. He is refusing to eat today. Previously with aggitation/behaviors. He has been on Seroquel. He is also on gabapentin at  for stroke induced neuropathy. Underlying anxiety and depression exacerbated by CVA. Suicidal ideations expressed. Pt does not have a plan to harm self. He is being followed by [facility]. He continues on Mirtazapine.     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  -Brain MRI showed right PCA stroke.  -Video EEG with no seizure.  -Tylenol 650 mg TID.   -gabapentin 200 mg nightly..   -ST following.    Lethargy  -UA/UC   -STAT CBC,BMP.     Agitation  -stroke induced.   -Seroquel 25 mg BID.   -HOLD Seroquel today.     Depression   -Continue Remeron 15 mg at bedtime.   -pt with suicidal ideation in hospital.  Patient reporting that he would run his car off the bridge.  Suicidal thought present.  Patient has no plans.   -[facility] following.     DANIA on CKD, stage 3.   -Baseline creatinine 1.20-1.7.  -Follow up today.    -Additional 240 ml with each med pass.    -assist with all meals.     Coronary artery disease of native artery of native heart with stable angina pectoris (H24)  -Status post coronary angiogram on 1/4/2024 revealing multivessel coronary artery disease.  -CV surgery following.  Initially CABG was recommended now cardiology is planning possible PCI.   -Hold off for 4 weeks per neurology recommendations  -Continue aspirin and statin.  -Echocardiogram showed ejection fracture of 59%.  -Continue Isosorbide.     Type 2 diabetes mellitus with hyperosmolarity without coma, unspecified whether long term insulin use (H)  -Consistent carb diet.  -Hemoglobin A1c 6.5% on 1/4/2024.  -Previously on metformin, Farxiga and Ozempic.  Discontinued during hospitalization.  -Blood sugar checks twice daily.   -Offer at bedtime snack.      Active Ambulatory Problems     Diagnosis Date Noted     Abnormal stress test 01/04/2024     Acute ischemic right posterior cerebral artery (PCA) stroke (H) 01/04/2024     Cerebrovascular accident (CVA) due to occlusion of left posterior cerebral artery (H) 07/04/2022     Type 2 diabetes mellitus (H) 02/16/2017     Tinnitus, bilateral 04/04/2022     Stage 3b chronic kidney disease (H) 03/13/2021     Onychomycosis of right great toe 04/05/2023     NAFLD (nonalcoholic fatty liver disease) 10/09/2019     Mixed dyslipidemia 05/05/2011     Elevated lipoprotein(a) 04/06/2022     Coronary artery disease of native artery of native heart with stable angina pectoris (H24) 01/04/2024     Visceral obesity 11/13/2020     Periodontitis 12/06/2023     Resolved Ambulatory Problems     Diagnosis Date Noted     No Resolved Ambulatory Problems     No Additional Past Medical History     Allergies   Allergen Reactions     Latex Hives and Rash     Anesthetics, Lorna Rash       All Meds and Allergies reviewed in the record at the facility and is the most up-to-date.    Current Outpatient Medications   Medication Sig     acetaminophen (TYLENOL) 325 MG tablet Take 650  "mg by mouth 3 times daily     aspirin 81 MG EC tablet Take 81 mg by mouth daily     atorvastatin (LIPITOR) 80 MG tablet Take 80 mg by mouth at bedtime     clopidogrel (PLAVIX) 75 MG tablet Take 75 mg by mouth daily     gabapentin (NEURONTIN) 100 MG capsule Take 200 mg by mouth at bedtime     isosorbide dinitrate (ISORDIL) 5 MG tablet Take 5 mg by mouth 3 times daily     metoprolol tartrate (LOPRESSOR) 25 MG tablet Take 25 mg by mouth 2 times daily     mirtazapine (REMERON) 15 MG tablet Take 15 mg by mouth at bedtime     nitroGLYcerin (NITROSTAT) 0.4 MG sublingual tablet Place 0.4 mg under the tongue every 5 minutes as needed for chest pain For chest pain place 1 tablet under the tongue every 5 minutes for 3 doses. If symptoms persist 5 minutes after 1st dose call 911.     QUEtiapine (SEROQUEL) 25 MG tablet Take 25 mg by mouth 2 times daily     Suvorexant (BELSOMRA) 10 MG tablet Take 10 mg by mouth nightly as needed for sleep     No current facility-administered medications for this visit.       REVIEW OF SYSTEMS:   10 point review of systems reviewed and pertinent positives in the HPI.     PHYSICAL EXAMINATION:  Physical Exam     Vital signs: /84   Pulse 71   Temp 97.4  F (36.3  C)   Resp 18   Ht 1.778 m (5' 10\")   Wt 78.2 kg (172 lb 8 oz)   SpO2 99%   BMI 24.75 kg/m    General: Alert, awake, oriented x2, lying in bed,  follows simple commands at times   HEENT:Pink conjunctiva, dry oral mucosa, left side facial droop. Speech clear.   NECK: Supple  CARDIOVASCULAR: S1, S2 without murmur or gallop.   RESPIRATORY: No wheezes, rale or rhonchi.   BACK: No kyphosis of the thoracic spine  EXTREMITIES: Left side hemiparesis, no pedal edema.  Move RLL when palpating LLE.   SKIN: Warm and dry  NEUROLOGIC: See above, pulses palpable.  PSYCHIATRIC: Flat affect.       Labs:  All labs reviewed in the nursing home record and [facility]     This note has been dictated using voice recognition software. Any grammatical or " context distortions are unintentional and inherent to the software    Electronically signed by: Essence Conteh CNP       Sincerely,        Essence Conteh, NP

## 2024-02-07 ENCOUNTER — TRANSITIONAL CARE UNIT VISIT (OUTPATIENT)
Dept: GERIATRICS | Facility: CLINIC | Age: 74
End: 2024-02-07
Payer: COMMERCIAL

## 2024-02-07 VITALS
SYSTOLIC BLOOD PRESSURE: 155 MMHG | BODY MASS INDEX: 24.69 KG/M2 | HEIGHT: 70 IN | HEART RATE: 59 BPM | WEIGHT: 172.5 LBS | TEMPERATURE: 97.3 F | OXYGEN SATURATION: 98 % | RESPIRATION RATE: 18 BRPM | DIASTOLIC BLOOD PRESSURE: 63 MMHG

## 2024-02-07 DIAGNOSIS — R45.1 AGITATION: ICD-10-CM

## 2024-02-07 DIAGNOSIS — I25.118 CORONARY ARTERY DISEASE OF NATIVE ARTERY OF NATIVE HEART WITH STABLE ANGINA PECTORIS (H): ICD-10-CM

## 2024-02-07 DIAGNOSIS — F34.1 PERSISTENT DEPRESSIVE DISORDER WITH ANXIOUS DISTRESS, CURRENTLY SEVERE: ICD-10-CM

## 2024-02-07 DIAGNOSIS — R53.83 LETHARGY: ICD-10-CM

## 2024-02-07 DIAGNOSIS — I63.531 ACUTE ISCHEMIC RIGHT POSTERIOR CEREBRAL ARTERY (PCA) STROKE (H): Primary | ICD-10-CM

## 2024-02-07 DIAGNOSIS — E11.00 TYPE 2 DIABETES MELLITUS WITH HYPEROSMOLARITY WITHOUT COMA, UNSPECIFIED WHETHER LONG TERM INSULIN USE (H): ICD-10-CM

## 2024-02-07 PROCEDURE — 99309 SBSQ NF CARE MODERATE MDM 30: CPT | Performed by: NURSE PRACTITIONER

## 2024-02-07 RX ORDER — ESCITALOPRAM OXALATE 5 MG/1
5 TABLET ORAL DAILY
COMMUNITY
End: 2024-05-08

## 2024-02-07 NOTE — PROGRESS NOTES
[facility] GERIATRIC SERVICES    Code Status:  DNR   Visit Type:   Chief Complaint   Patient presents with    TCU Follow Up     Facility:  [facility] ([facility]) [[id number]]           HPI: Shjai Markham is a 73 year old male who I am seeing today for follow up on the TCU. 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  Patient hospitalized on 1/4/2024 due to left-sided weakness and right gaze following cardiac catheterization.  Patient underwent a coronary angiogram on 1/4/2024 in the Cath Lab at [facility].  He received a heparin bolus.  After the procedure was done staff noted the patient exhibiting right gaze deviation.  He also endorsed left Heema body numbness and weakness he was transported to the ED accompanied by the neurologist.  In the ED tPA not indicated as patient had already received heparin.  CT was not indicated of LVO so no endovascular treatment was pursued.  Patient given aspirin suppository.  MRI of the brain showed restricted diffusion defect in the right hippocampal body and tail likely representing acute ischemia.  Repeat MRI of the brain 1/5/2024 demonstrated increased size of the recent right PCA territory infarct involving the hippocampus and occipital lobe with mild localized mass effect.  Possible trace SAH overlying the right middle lobe.  EEG negative.  Vascular surgery consulted for carotid disease.  Severe greater than 70% stenosis of bilateral carotid arteries.  Vascular surgery recommended aspirin with possible carotid artery revascularization in the future.  DAPT and statin therapy.  Vascular recommended 3-month follow-up with repeat carotid artery ultrasound.  Cardiothoracic surgery following.  Patient underwent coronary angiogram on 1/4/2024 which showed multivessel CAD including 70% proximal LAD disease.  CT surgery  recommended CABG versus complex percutaneous coronary intervention.  Neurology recommended at least 4 weeks before considering any intervention.  Patient with suicidal thoughts and ideation during hospitalization.  Psychiatric consulted.  Patient found to have a general disorder with depressed mood.  Worsened left-sided weakness on 1/9/2024.  Repeat head CT showed evolving stroke.  Patient with dense left hemiplegia. Therapy reporting occ. Increased spasticity.     Transitional Care Course: Today patient lying in bed. Nursing staff reporting pt lethargic today. Pt not wanting to get OOB. He is arousable. Neuros intact. A febrile. He is refusing to eat today. Previously with aggitation/behaviors. He has been on Seroquel. He is also on gabapentin at  for stroke induced neuropathy. Underlying anxiety and depression exacerbated by CVA. Suicidal ideations expressed. Pt does not have a plan to harm self. He is being followed by [facility]. He continues on Mirtazapine.     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  -Brain MRI showed right PCA stroke.  -Video EEG with no seizure.  -Tylenol 650 mg TID.   -gabapentin 200 mg nightly..   -ST following.    Lethargy  -UA/UC   -STAT CBC,BMP.     Agitation  -stroke induced.   -Seroquel 25 mg BID.   -HOLD Seroquel today.     Depression   -Continue Remeron 15 mg at bedtime.   -pt with suicidal ideation in hospital.  Patient reporting that he would run his car off the bridge.  Suicidal thought present.  Patient has no plans.   -[facility] following.     DANIA on CKD, stage 3.   -Baseline creatinine 1.20-1.7.  -Follow up today.    -Additional 240 ml with each med pass.   -assist with all meals.     Coronary artery disease of native artery of native  heart with stable angina pectoris (H24)  -Status post coronary angiogram on 1/4/2024 revealing multivessel coronary artery disease.  -CV surgery following.  Initially CABG was recommended now cardiology is planning possible PCI.   -Hold off for 4 weeks per neurology recommendations  -Continue aspirin and statin.  -Echocardiogram showed ejection fracture of 59%.  -Continue Isosorbide.     Type 2 diabetes mellitus with hyperosmolarity without coma, unspecified whether long term insulin use (H)  -Consistent carb diet.  -Hemoglobin A1c 6.5% on 1/4/2024.  -Previously on metformin, Farxiga and Ozempic.  Discontinued during hospitalization.  -Blood sugar checks twice daily.   -Offer at bedtime snack.      Active Ambulatory Problems     Diagnosis Date Noted    Abnormal stress test 01/04/2024    Acute ischemic right posterior cerebral artery (PCA) stroke (H) 01/04/2024    Cerebrovascular accident (CVA) due to occlusion of left posterior cerebral artery (H) 07/04/2022    Type 2 diabetes mellitus (H) 02/16/2017    Tinnitus, bilateral 04/04/2022    Stage 3b chronic kidney disease (H) 03/13/2021    Onychomycosis of right great toe 04/05/2023    NAFLD (nonalcoholic fatty liver disease) 10/09/2019    Mixed dyslipidemia 05/05/2011    Elevated lipoprotein(a) 04/06/2022    Coronary artery disease of native artery of native heart with stable angina pectoris (H24) 01/04/2024    Visceral obesity 11/13/2020    Periodontitis 12/06/2023     Resolved Ambulatory Problems     Diagnosis Date Noted    No Resolved Ambulatory Problems     No Additional Past Medical History     Allergies   Allergen Reactions    Latex Hives and Rash    Anesthetics, Lorna Rash       All Meds and Allergies reviewed in the record at the facility and is the most up-to-date.    Current Outpatient Medications   Medication Sig    acetaminophen (TYLENOL) 325 MG tablet Take 650 mg by mouth 3 times daily    aspirin 81 MG EC tablet Take 81 mg by mouth daily    atorvastatin  "(LIPITOR) 80 MG tablet Take 80 mg by mouth at bedtime    clopidogrel (PLAVIX) 75 MG tablet Take 75 mg by mouth daily    gabapentin (NEURONTIN) 100 MG capsule Take 200 mg by mouth at bedtime    isosorbide dinitrate (ISORDIL) 5 MG tablet Take 5 mg by mouth 3 times daily    metoprolol tartrate (LOPRESSOR) 25 MG tablet Take 25 mg by mouth 2 times daily    mirtazapine (REMERON) 15 MG tablet Take 15 mg by mouth at bedtime    nitroGLYcerin (NITROSTAT) 0.4 MG sublingual tablet Place 0.4 mg under the tongue every 5 minutes as needed for chest pain For chest pain place 1 tablet under the tongue every 5 minutes for 3 doses. If symptoms persist 5 minutes after 1st dose call 911.    QUEtiapine (SEROQUEL) 25 MG tablet Take 25 mg by mouth 2 times daily    Suvorexant (BELSOMRA) 10 MG tablet Take 10 mg by mouth nightly as needed for sleep     No current facility-administered medications for this visit.       REVIEW OF SYSTEMS:   10 point review of systems reviewed and pertinent positives in the HPI.     PHYSICAL EXAMINATION:  Physical Exam     Vital signs: /84   Pulse 71   Temp 97.4  F (36.3  C)   Resp 18   Ht 1.778 m (5' 10\")   Wt 78.2 kg (172 lb 8 oz)   SpO2 99%   BMI 24.75 kg/m    General: Alert, awake, oriented x2, lying in bed,  follows simple commands at times   HEENT:Pink conjunctiva, dry oral mucosa, left side facial droop. Speech clear.   NECK: Supple  CARDIOVASCULAR: S1, S2 without murmur or gallop.   RESPIRATORY: No wheezes, rale or rhonchi.   BACK: No kyphosis of the thoracic spine  EXTREMITIES: Left side hemiparesis, no pedal edema.  Move RLL when palpating LLE.   SKIN: Warm and dry  NEUROLOGIC: See above, pulses palpable.  PSYCHIATRIC: Flat affect.       Labs:  All labs reviewed in the nursing home record and [facility]     This note has been dictated using voice recognition software. Any grammatical or context distortions are unintentional and inherent to the software    Electronically signed by: Essence Conteh, " CNP

## 2024-02-07 NOTE — LETTER
2/7/2024        RE: Shaji Markham  [address] [facility] [address]        [facility] GERIATRIC SERVICES    Code Status:  DNR   Visit Type:   Chief Complaint   Patient presents with     TCU FOLLOW UP     Facility:  [facility]) [[id number]]           HPI: Shaji Markham is a 73 year old male who I am seeing today for follow up on the TCU. 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  Patient hospitalized on 1/4/2024 due to left-sided weakness and right gaze following cardiac catheterization.  Patient underwent a coronary angiogram on 1/4/2024 in the Cath Lab at [facility].  He received a heparin bolus.  After the procedure was done staff noted the patient exhibiting right gaze deviation.  He also endorsed left Heema body numbness and weakness he was transported to the ED accompanied by the neurologist.  In the ED tPA not indicated as patient had already received heparin.  CT was not indicated of LVO so no endovascular treatment was pursued.  Patient given aspirin suppository.  MRI of the brain showed restricted diffusion defect in the right hippocampal body and tail likely representing acute ischemia.  Repeat MRI of the brain 1/5/2024 demonstrated increased size of the recent right PCA territory infarct involving the hippocampus and occipital lobe with mild localized mass effect.  Possible trace SAH overlying the right middle lobe.  EEG negative.  Vascular surgery consulted for carotid disease.  Severe greater than 70% stenosis of bilateral carotid arteries.  Vascular surgery recommended aspirin with possible carotid artery revascularization in the future.  DAPT and statin therapy.  Vascular recommended 3-month follow-up with repeat carotid artery ultrasound.  Cardiothoracic surgery following.  Patient underwent coronary angiogram on 1/4/2024  which showed multivessel CAD including 70% proximal LAD disease.  CT surgery recommended CABG versus complex percutaneous coronary intervention.  Neurology recommended at least 4 weeks before considering any intervention.  Patient with suicidal thoughts and ideation during hospitalization.  Psychiatric consulted.  Patient found to have a general disorder with depressed mood.  Worsened left-sided weakness on 1/9/2024.  Repeat head CT showed evolving stroke.  Patient with dense left hemiplegia. Therapy reporting occ. Increased spasticity.     Transitional Care Course: Today patient lying in bed. His brother and sister n law are visiting. Yesterday pt lethargic. Today he is more awake and talking to family at bedside. His laboratory was reviewed and is unremarkable. UA appears negative. UC pending. Nursing reporting pt will spit out his meds at night. He is receiving Mirtazapine for mood and sleep at hs. He continues with depression and anxiety exacerbated by CVA. We did talk about this on exam today and with his family. Pt is seeing ACP. He is experiencing stages of grief due to current condition. He was started on Seroquel for agitation. He has expressed suicidal ideations. He does not have a plan. 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 He denies any pain on today's visit. Suicidal ideations expressed.     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  -Brain MRI showed right PCA stroke.  -Video EEG with no seizure.  -Tylenol 650 mg TID.   -Continue gabapentin 200 mg nightly.   -ST following.    Lethargy  -UA appears negative. UC pending.   -laboratory unremarkable.   -more alert today and talkative.     Agitation  -stroke induced.   -Decrease  Seroquel to 12.5 mg BID with attempt to wean off.     Depression   -Discontinue Remeron.   -Start Lexapro 5 mg daily X 1 week then increase to 10 mg daily.   -pt with suicidal ideation in hospital. Suicidal thought present.  Patient has no plans.   -ACP following.     DANIA on CKD, stage 3.   -Baseline creatinine 1.20-1.7.  -Follow up today.    -Additional 240 ml with each med pass.   -assist with all meals.     Coronary artery disease of native artery of native heart with stable angina pectoris (H24)  -Status post coronary angiogram on 1/4/2024 revealing multivessel coronary artery disease.  -CV surgery following.  Initially CABG was recommended now cardiology is planning possible PCI.   -Hold off for 4 weeks per neurology recommendations  -Continue aspirin and statin.  -Echocardiogram showed ejection fracture of 59%.  -Continue Isosorbide.     Type 2 diabetes mellitus with hyperosmolarity without coma, unspecified whether long term insulin use (H)  -Consistent carb diet.  -Hemoglobin A1c 6.5% on 1/4/2024.  -Previously on metformin, Farxiga and Ozempic.  Discontinued during hospitalization.  -Blood sugar checks twice daily.   -Offer at bedtime snack.      Active Ambulatory Problems     Diagnosis Date Noted     Abnormal stress test 01/04/2024     Acute ischemic right posterior cerebral artery (PCA) stroke (H) 01/04/2024     Cerebrovascular accident (CVA) due to occlusion of left posterior cerebral artery (H) 07/04/2022     Type 2 diabetes mellitus (H) 02/16/2017     Tinnitus, bilateral 04/04/2022     Stage 3b chronic kidney disease (H) 03/13/2021     Onychomycosis of right great toe 04/05/2023     NAFLD (nonalcoholic fatty liver disease) 10/09/2019     Mixed dyslipidemia 05/05/2011     Elevated lipoprotein(a) 04/06/2022     Coronary artery disease of native artery of native heart with stable angina pectoris (H24) 01/04/2024     Visceral obesity 11/13/2020     Periodontitis 12/06/2023     Resolved Ambulatory Problems  "    Diagnosis Date Noted     No Resolved Ambulatory Problems     No Additional Past Medical History     Allergies   Allergen Reactions     Latex Hives and Rash     Anesthetics, Lorna Rash       All Meds and Allergies reviewed in the record at the facility and is the most up-to-date.    Current Outpatient Medications   Medication Sig     escitalopram (LEXAPRO) 5 MG tablet Take 5 mg by mouth daily Start 5 mg PO daily X 1 week then increase to 10 mg daily.     acetaminophen (TYLENOL) 325 MG tablet Take 650 mg by mouth 3 times daily     aspirin 81 MG EC tablet Take 81 mg by mouth daily     atorvastatin (LIPITOR) 80 MG tablet Take 80 mg by mouth at bedtime     clopidogrel (PLAVIX) 75 MG tablet Take 75 mg by mouth daily     gabapentin (NEURONTIN) 100 MG capsule Take 200 mg by mouth at bedtime     isosorbide dinitrate (ISORDIL) 5 MG tablet Take 5 mg by mouth 3 times daily     metoprolol tartrate (LOPRESSOR) 25 MG tablet Take 25 mg by mouth 2 times daily     nitroGLYcerin (NITROSTAT) 0.4 MG sublingual tablet Place 0.4 mg under the tongue every 5 minutes as needed for chest pain For chest pain place 1 tablet under the tongue every 5 minutes for 3 doses. If symptoms persist 5 minutes after 1st dose call 911.     QUEtiapine (SEROQUEL) 25 MG tablet Take 12.5 mg by mouth 2 times daily     Suvorexant (BELSOMRA) 10 MG tablet Take 10 mg by mouth nightly as needed for sleep     No current facility-administered medications for this visit.       REVIEW OF SYSTEMS:   10 point review of systems reviewed and pertinent positives in the HPI.     PHYSICAL EXAMINATION:  Physical Exam     Vital signs: BP (!) 155/63   Pulse 59   Temp 97.3  F (36.3  C)   Resp 18   Ht 1.778 m (5' 10\")   Wt 78.2 kg (172 lb 8 oz)   SpO2 98%   BMI 24.75 kg/m    General: Alert, awake, oriented x2, lying in bed,  follows simple commands at times   HEENT:Pink conjunctiva, dry oral mucosa, left side facial droop. Speech clear.   NECK: Supple  CARDIOVASCULAR: S1, " S2 without murmur or gallop.   RESPIRATORY: No wheezes, rale or rhonchi.   BACK: No kyphosis of the thoracic spine  EXTREMITIES: Left side hemiparesis, no pedal edema.  Move RLL when palpating LLE.   SKIN: Warm and dry  NEUROLOGIC: See above, pulses palpable.  PSYCHIATRIC: Calm affect.       Labs:  All labs reviewed in the nursing home record and Epic     This note has been dictated using voice recognition software. Any grammatical or context distortions are unintentional and inherent to the software    Electronically signed by: Essence Conteh CNP       Sincerely,        Essence Conteh NP

## 2024-02-08 LAB
BACTERIA UR CULT: ABNORMAL

## 2024-02-08 NOTE — PROGRESS NOTES
[facility] GERIATRIC SERVICES    Code Status:  DNR   Visit Type:   Chief Complaint   Patient presents with    TCU FOLLOW UP     Facility:  [facility] ([facility]) [[id number]]           HPI: Shaji Markham is a 73 year old male who I am seeing today for follow up on the TCU. 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  Patient hospitalized on 1/4/2024 due to left-sided weakness and right gaze following cardiac catheterization.  Patient underwent a coronary angiogram on 1/4/2024 in the Cath Lab at [facility].  He received a heparin bolus.  After the procedure was done staff noted the patient exhibiting right gaze deviation.  He also endorsed left Heema body numbness and weakness he was transported to the ED accompanied by the neurologist.  In the ED tPA not indicated as patient had already received heparin.  CT was not indicated of LVO so no endovascular treatment was pursued.  Patient given aspirin suppository.  MRI of the brain showed restricted diffusion defect in the right hippocampal body and tail likely representing acute ischemia.  Repeat MRI of the brain 1/5/2024 demonstrated increased size of the recent right PCA territory infarct involving the hippocampus and occipital lobe with mild localized mass effect.  Possible trace SAH overlying the right middle lobe.  EEG negative.  Vascular surgery consulted for carotid disease.  Severe greater than 70% stenosis of bilateral carotid arteries.  Vascular surgery recommended aspirin with possible carotid artery revascularization in the future.  DAPT and statin therapy.  Vascular recommended 3-month follow-up with repeat carotid artery ultrasound.  Cardiothoracic surgery following.  Patient underwent coronary angiogram on 1/4/2024 which showed multivessel CAD including 70% proximal LAD disease.  CT surgery  recommended CABG versus complex percutaneous coronary intervention.  Neurology recommended at least 4 weeks before considering any intervention.  Patient with suicidal thoughts and ideation during hospitalization.  Psychiatric consulted.  Patient found to have a general disorder with depressed mood.  Worsened left-sided weakness on 1/9/2024.  Repeat head CT showed evolving stroke.  Patient with dense left hemiplegia. Therapy reporting occ. Increased spasticity.     Transitional Care Course: Today patient lying in bed. His brother and sister n law are visiting. Yesterday pt lethargic. Today he is more awake and talking to family at bedside. His laboratory was reviewed and is unremarkable. UA appears negative. UC pending. Nursing reporting pt will spit out his meds at night. He is receiving Mirtazapine for mood and sleep at hs. He continues with depression and anxiety exacerbated by CVA. We did talk about this on exam today and with his family. Pt is seeing ACP. He is experiencing stages of grief due to current condition. He was started on Seroquel for agitation. He has expressed suicidal ideations. He does not have a plan. 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 He denies any pain on today's visit. Suicidal ideations expressed.     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  -Brain MRI showed right PCA stroke.  -Video EEG with no seizure.  -Tylenol 650 mg TID.   -Continue gabapentin 200 mg nightly.   -ST following.    Lethargy  -UA appears negative. UC pending.   -laboratory unremarkable.   -more alert today and talkative.     Agitation  -stroke induced.   -Decrease Seroquel to 12.5 mg BID with attempt to wean off.     Depression   -Discontinue  Remeron.   -Start Lexapro 5 mg daily X 1 week then increase to 10 mg daily.   -pt with suicidal ideation in hospital. Suicidal thought present.  Patient has no plans.   -ACP following.     DANIA on CKD, stage 3.   -Baseline creatinine 1.20-1.7.  -Follow up today.    -Additional 240 ml with each med pass.   -assist with all meals.     Coronary artery disease of native artery of native heart with stable angina pectoris (H24)  -Status post coronary angiogram on 1/4/2024 revealing multivessel coronary artery disease.  -CV surgery following.  Initially CABG was recommended now cardiology is planning possible PCI.   -Hold off for 4 weeks per neurology recommendations  -Continue aspirin and statin.  -Echocardiogram showed ejection fracture of 59%.  -Continue Isosorbide.     Type 2 diabetes mellitus with hyperosmolarity without coma, unspecified whether long term insulin use (H)  -Consistent carb diet.  -Hemoglobin A1c 6.5% on 1/4/2024.  -Previously on metformin, Farxiga and Ozempic.  Discontinued during hospitalization.  -Blood sugar checks twice daily.   -Offer at bedtime snack.      Active Ambulatory Problems     Diagnosis Date Noted    Abnormal stress test 01/04/2024    Acute ischemic right posterior cerebral artery (PCA) stroke (H) 01/04/2024    Cerebrovascular accident (CVA) due to occlusion of left posterior cerebral artery (H) 07/04/2022    Type 2 diabetes mellitus (H) 02/16/2017    Tinnitus, bilateral 04/04/2022    Stage 3b chronic kidney disease (H) 03/13/2021    Onychomycosis of right great toe 04/05/2023    NAFLD (nonalcoholic fatty liver disease) 10/09/2019    Mixed dyslipidemia 05/05/2011    Elevated lipoprotein(a) 04/06/2022    Coronary artery disease of native artery of native heart with stable angina pectoris (H24) 01/04/2024    Visceral obesity 11/13/2020    Periodontitis 12/06/2023     Resolved Ambulatory Problems     Diagnosis Date Noted    No Resolved Ambulatory Problems     No Additional Past Medical  "History     Allergies   Allergen Reactions    Latex Hives and Rash    Anesthetics, Lorna Rash       All Meds and Allergies reviewed in the record at the facility and is the most up-to-date.    Current Outpatient Medications   Medication Sig    escitalopram (LEXAPRO) 5 MG tablet Take 5 mg by mouth daily Start 5 mg PO daily X 1 week then increase to 10 mg daily.    acetaminophen (TYLENOL) 325 MG tablet Take 650 mg by mouth 3 times daily    aspirin 81 MG EC tablet Take 81 mg by mouth daily    atorvastatin (LIPITOR) 80 MG tablet Take 80 mg by mouth at bedtime    clopidogrel (PLAVIX) 75 MG tablet Take 75 mg by mouth daily    gabapentin (NEURONTIN) 100 MG capsule Take 200 mg by mouth at bedtime    isosorbide dinitrate (ISORDIL) 5 MG tablet Take 5 mg by mouth 3 times daily    metoprolol tartrate (LOPRESSOR) 25 MG tablet Take 25 mg by mouth 2 times daily    nitroGLYcerin (NITROSTAT) 0.4 MG sublingual tablet Place 0.4 mg under the tongue every 5 minutes as needed for chest pain For chest pain place 1 tablet under the tongue every 5 minutes for 3 doses. If symptoms persist 5 minutes after 1st dose call 911.    QUEtiapine (SEROQUEL) 25 MG tablet Take 12.5 mg by mouth 2 times daily    Suvorexant (BELSOMRA) 10 MG tablet Take 10 mg by mouth nightly as needed for sleep     No current facility-administered medications for this visit.       REVIEW OF SYSTEMS:   10 point review of systems reviewed and pertinent positives in the HPI.     PHYSICAL EXAMINATION:  Physical Exam     Vital signs: BP (!) 155/63   Pulse 59   Temp 97.3  F (36.3  C)   Resp 18   Ht 1.778 m (5' 10\")   Wt 78.2 kg (172 lb 8 oz)   SpO2 98%   BMI 24.75 kg/m    General: Alert, awake, oriented x2, lying in bed,  follows simple commands at times   HEENT:Pink conjunctiva, dry oral mucosa, left side facial droop. Speech clear.   NECK: Supple  CARDIOVASCULAR: S1, S2 without murmur or gallop.   RESPIRATORY: No wheezes, rale or rhonchi.   BACK: No kyphosis of the " thoracic spine  EXTREMITIES: Left side hemiparesis, no pedal edema.  Move RLL when palpating LLE.   SKIN: Warm and dry  NEUROLOGIC: See above, pulses palpable.  PSYCHIATRIC: Calm affect.       Labs:  All labs reviewed in the nursing home record and Epic     This note has been dictated using voice recognition software. Any grammatical or context distortions are unintentional and inherent to the software    Electronically signed by: Essence Conteh CNP    7

## 2024-02-13 ENCOUNTER — TRANSITIONAL CARE UNIT VISIT (OUTPATIENT)
Dept: GERIATRICS | Facility: CLINIC | Age: 74
End: 2024-02-13
Payer: COMMERCIAL

## 2024-02-13 VITALS
SYSTOLIC BLOOD PRESSURE: 100 MMHG | WEIGHT: 172.5 LBS | RESPIRATION RATE: 17 BRPM | HEIGHT: 70 IN | OXYGEN SATURATION: 95 % | BODY MASS INDEX: 24.69 KG/M2 | DIASTOLIC BLOOD PRESSURE: 64 MMHG | TEMPERATURE: 97.8 F | HEART RATE: 73 BPM

## 2024-02-13 DIAGNOSIS — I25.118 CORONARY ARTERY DISEASE OF NATIVE ARTERY OF NATIVE HEART WITH STABLE ANGINA PECTORIS (H): ICD-10-CM

## 2024-02-13 DIAGNOSIS — F34.1 PERSISTENT DEPRESSIVE DISORDER WITH ANXIOUS DISTRESS, CURRENTLY SEVERE: ICD-10-CM

## 2024-02-13 DIAGNOSIS — E11.00 TYPE 2 DIABETES MELLITUS WITH HYPEROSMOLARITY WITHOUT COMA, UNSPECIFIED WHETHER LONG TERM INSULIN USE (H): ICD-10-CM

## 2024-02-13 DIAGNOSIS — I63.531 ACUTE ISCHEMIC RIGHT POSTERIOR CEREBRAL ARTERY (PCA) STROKE (H): Primary | ICD-10-CM

## 2024-02-13 DIAGNOSIS — R45.1 AGITATION: ICD-10-CM

## 2024-02-13 PROCEDURE — 99309 SBSQ NF CARE MODERATE MDM 30: CPT | Performed by: NURSE PRACTITIONER

## 2024-02-13 NOTE — PROGRESS NOTES
[facility] GERIATRIC SERVICES    Code Status:  DNR   Visit Type:   Chief Complaint   Patient presents with    TCU Follow Up     Facility:  [facility] ([facility]) [[id number]]           HPI: Shaji Markham is a 73 year old male who I am seeing today for follow up on the TCU. 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  Patient hospitalized on 1/4/2024 due to left-sided weakness and right gaze following cardiac catheterization.  Patient underwent a coronary angiogram on 1/4/2024 in the Cath Lab at [facility].  He received a heparin bolus.  After the procedure was done staff noted the patient exhibiting right gaze deviation.  He also endorsed left Heema body numbness and weakness he was transported to the ED accompanied by the neurologist.  In the ED tPA not indicated as patient had already received heparin.  CT was not indicated of LVO so no endovascular treatment was pursued.  Patient given aspirin suppository.  MRI of the brain showed restricted diffusion defect in the right hippocampal body and tail likely representing acute ischemia.  Repeat MRI of the brain 1/5/2024 demonstrated increased size of the recent right PCA territory infarct involving the hippocampus and occipital lobe with mild localized mass effect.  Possible trace SAH overlying the right middle lobe.  EEG negative.  Vascular surgery consulted for carotid disease.  Severe greater than 70% stenosis of bilateral carotid arteries.  Vascular surgery recommended aspirin with possible carotid artery revascularization in the future.  DAPT and statin therapy.  Vascular recommended 3-month follow-up with repeat carotid artery ultrasound.  Cardiothoracic surgery following.  Patient underwent coronary angiogram on 1/4/2024 which showed multivessel CAD including 70% proximal LAD disease.  CT surgery  recommended CABG versus complex percutaneous coronary intervention.  Neurology recommended at least 4 weeks before considering any intervention.  Patient with suicidal thoughts and ideation during hospitalization.  Psychiatric consulted.  Patient found to have a general disorder with depressed mood.  Worsened left-sided weakness on 1/9/2024.  Repeat head CT showed evolving stroke.  Patient with dense left hemiplegia. Therapy reporting occ. Increased spasticity.     Transitional Care Course: Today patient sitting up in wheelchair. Pt with recent CVA with left sided hemiparesis. Pt with underlying anxiety with depression. Prior to my visit he was agitated and attempting to remove drawers from furniture in dayroom. Unredirectable at times. He is calm sitting in front of TV on exam. He does express frustration with current situation and loss of independence. He continues to see ACP. Recently started on lexapro. Continues on Seroquel.       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  -Brain MRI showed right PCA stroke.  -Video EEG with no seizure.  -Tylenol 650 mg TID.   -Continue gabapentin 200 mg nightly.   -ST following.    Agitation  -stroke induced.   -Continue Seroquel to 12.5 mg BID.     Depression   -Remeron discontinued.   -Continue Lexapro 5 mg daily X 1 week then increase to 10 mg daily.   -pt with suicidal ideation in hospital. Suicidal thought present.  Patient has no plans.   -ACP following.     DANIA on CKD, stage 3.   -Baseline creatinine 1.20-1.7.  -Additional 240 ml with each med pass.   -assist with all meals.     Coronary artery disease of native artery of native heart with stable angina pectoris (H24)  -Status post coronary angiogram on 1/4/2024  revealing multivessel coronary artery disease.  -CV surgery following.  Initially CABG was recommended now cardiology is planning possible PCI.   -Hold off for 4 weeks per neurology recommendations  -Continue aspirin and statin.  -Echocardiogram showed ejection fracture of 59%.  -Continue Isosorbide.     Type 2 diabetes mellitus with hyperosmolarity without coma, unspecified whether long term insulin use (H)  -Consistent carb diet.  -Hemoglobin A1c 6.5% on 1/4/2024.  -Previously on metformin, Farxiga and Ozempic.  Discontinued during hospitalization.  -Blood sugar checks twice daily.   -Offer at bedtime snack.      Active Ambulatory Problems     Diagnosis Date Noted    Abnormal stress test 01/04/2024    Acute ischemic right posterior cerebral artery (PCA) stroke (H) 01/04/2024    Cerebrovascular accident (CVA) due to occlusion of left posterior cerebral artery (H) 07/04/2022    Type 2 diabetes mellitus (H) 02/16/2017    Tinnitus, bilateral 04/04/2022    Stage 3b chronic kidney disease (H) 03/13/2021    Onychomycosis of right great toe 04/05/2023    NAFLD (nonalcoholic fatty liver disease) 10/09/2019    Mixed dyslipidemia 05/05/2011    Elevated lipoprotein(a) 04/06/2022    Coronary artery disease of native artery of native heart with stable angina pectoris (H24) 01/04/2024    Visceral obesity 11/13/2020    Periodontitis 12/06/2023     Resolved Ambulatory Problems     Diagnosis Date Noted    No Resolved Ambulatory Problems     No Additional Past Medical History     Allergies   Allergen Reactions    Latex Hives and Rash    Anesthetics, Lorna Rash       All Meds and Allergies reviewed in the record at the facility and is the most up-to-date.    Current Outpatient Medications   Medication Sig    acetaminophen (TYLENOL) 325 MG tablet Take 650 mg by mouth 3 times daily    aspirin 81 MG EC tablet Take 81 mg by mouth daily    atorvastatin (LIPITOR) 80 MG tablet Take 80 mg by mouth at bedtime    clopidogrel (PLAVIX) 75 MG  "tablet Take 75 mg by mouth daily    escitalopram (LEXAPRO) 5 MG tablet Take 5 mg by mouth daily Start 5 mg PO daily X 1 week then increase to 10 mg daily.    gabapentin (NEURONTIN) 100 MG capsule Take 200 mg by mouth at bedtime    isosorbide dinitrate (ISORDIL) 5 MG tablet Take 5 mg by mouth 3 times daily    metoprolol tartrate (LOPRESSOR) 25 MG tablet Take 25 mg by mouth 2 times daily    nitroGLYcerin (NITROSTAT) 0.4 MG sublingual tablet Place 0.4 mg under the tongue every 5 minutes as needed for chest pain For chest pain place 1 tablet under the tongue every 5 minutes for 3 doses. If symptoms persist 5 minutes after 1st dose call 911.    QUEtiapine (SEROQUEL) 25 MG tablet Take 12.5 mg by mouth 2 times daily    Suvorexant (BELSOMRA) 10 MG tablet Take 10 mg by mouth nightly as needed for sleep     No current facility-administered medications for this visit.       REVIEW OF SYSTEMS:   10 point review of systems reviewed and pertinent positives in the HPI.     PHYSICAL EXAMINATION:  Physical Exam     Vital signs: /64   Pulse 73   Temp 97.8  F (36.6  C)   Resp 17   Ht 1.778 m (5' 10\")   Wt 78.2 kg (172 lb 8 oz)   SpO2 95%   BMI 24.75 kg/m    General: Alert, awake, oriented x2, lying in bed,  follows simple commands at times   HEENT:Pink conjunctiva, dry oral mucosa, left side facial droop. Speech clear.   NECK: Supple  CARDIOVASCULAR: S1, S2 without murmur or gallop.   RESPIRATORY: No wheezes, rale or rhonchi.   BACK: No kyphosis of the thoracic spine  EXTREMITIES: Left side hemiparesis, no pedal edema.  Move RLL when palpating LLE.   SKIN: Warm and dry  NEUROLOGIC: See above, pulses palpable.  PSYCHIATRIC: Depression. Flat affect.       Labs:  All labs reviewed in the nursing home record and [facility]     This note has been dictated using voice recognition software. Any grammatical or context distortions are unintentional and inherent to the software    Electronically signed by: Essence Conteh CNP   "

## 2024-02-13 NOTE — LETTER
2/13/2024        RE: Shaji Markham  [address] [facility] [address]        [place] GERIATRIC SERVICES    Code Status:  DNR   Visit Type:   Chief Complaint   Patient presents with     TCU Follow Up     Facility:  [facility]) [[id number]]           HPI: Shaji Markham is a 73 year old male who I am seeing today for follow up on the TCU. 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  Patient hospitalized on 1/4/2024 due to left-sided weakness and right gaze following cardiac catheterization.  Patient underwent a coronary angiogram on 1/4/2024 in the Cath Lab at [facility].  He received a heparin bolus.  After the procedure was done staff noted the patient exhibiting right gaze deviation.  He also endorsed left Heema body numbness and weakness he was transported to the ED accompanied by the neurologist.  In the ED tPA not indicated as patient had already received heparin.  CT was not indicated of LVO so no endovascular treatment was pursued.  Patient given aspirin suppository.  MRI of the brain showed restricted diffusion defect in the right hippocampal body and tail likely representing acute ischemia.  Repeat MRI of the brain 1/5/2024 demonstrated increased size of the recent right PCA territory infarct involving the hippocampus and occipital lobe with mild localized mass effect.  Possible trace SAH overlying the right middle lobe.  EEG negative.  Vascular surgery consulted for carotid disease.  Severe greater than 70% stenosis of bilateral carotid arteries.  Vascular surgery recommended aspirin with possible carotid artery revascularization in the future.  DAPT and statin therapy.  Vascular recommended 3-month follow-up with repeat carotid artery ultrasound.  Cardiothoracic surgery following.  Patient underwent coronary angiogram on 1/4/2024  which showed multivessel CAD including 70% proximal LAD disease.  CT surgery recommended CABG versus complex percutaneous coronary intervention.  Neurology recommended at least 4 weeks before considering any intervention.  Patient with suicidal thoughts and ideation during hospitalization.  Psychiatric consulted.  Patient found to have a general disorder with depressed mood.  Worsened left-sided weakness on 1/9/2024.  Repeat head CT showed evolving stroke.  Patient with dense left hemiplegia. Therapy reporting occ. Increased spasticity.     Transitional Care Course: Today patient sitting up in wheelchair. Pt with recent CVA with left sided hemiparesis. Pt with underlying anxiety with depression. Prior to my visit he was agitated and attempting to remove drawers from furniture in dayroom. Unredirectable at times. He is calm sitting in front of TV on exam. He does express frustration with current situation and loss of independence. He continues to see ACP. Recently started on lexapro. Continues on Seroquel.       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  -Brain MRI showed right PCA stroke.  -Video EEG with no seizure.  -Tylenol 650 mg TID.   -Continue gabapentin 200 mg nightly.   -ST following.    Agitation  -stroke induced.   -Continue Seroquel to 12.5 mg BID.     Depression   -Remeron discontinued.   -Continue Lexapro 5 mg daily X 1 week then increase to 10 mg daily.   -pt with suicidal ideation in hospital. Suicidal thought present.  Patient has no plans.   -ACP following.     DANIA on CKD, stage 3.   -Baseline creatinine 1.20-1.7.  -Additional 240 ml with each med pass.   -assist with all meals.     Coronary artery disease of native artery of native heart with  stable angina pectoris (H24)  -Status post coronary angiogram on 1/4/2024 revealing multivessel coronary artery disease.  -CV surgery following.  Initially CABG was recommended now cardiology is planning possible PCI.   -Hold off for 4 weeks per neurology recommendations  -Continue aspirin and statin.  -Echocardiogram showed ejection fracture of 59%.  -Continue Isosorbide.     Type 2 diabetes mellitus with hyperosmolarity without coma, unspecified whether long term insulin use (H)  -Consistent carb diet.  -Hemoglobin A1c 6.5% on 1/4/2024.  -Previously on metformin, Farxiga and Ozempic.  Discontinued during hospitalization.  -Blood sugar checks twice daily.   -Offer at bedtime snack.      Active Ambulatory Problems     Diagnosis Date Noted     Abnormal stress test 01/04/2024     Acute ischemic right posterior cerebral artery (PCA) stroke (H) 01/04/2024     Cerebrovascular accident (CVA) due to occlusion of left posterior cerebral artery (H) 07/04/2022     Type 2 diabetes mellitus (H) 02/16/2017     Tinnitus, bilateral 04/04/2022     Stage 3b chronic kidney disease (H) 03/13/2021     Onychomycosis of right great toe 04/05/2023     NAFLD (nonalcoholic fatty liver disease) 10/09/2019     Mixed dyslipidemia 05/05/2011     Elevated lipoprotein(a) 04/06/2022     Coronary artery disease of native artery of native heart with stable angina pectoris (H24) 01/04/2024     Visceral obesity 11/13/2020     Periodontitis 12/06/2023     Resolved Ambulatory Problems     Diagnosis Date Noted     No Resolved Ambulatory Problems     No Additional Past Medical History     Allergies   Allergen Reactions     Latex Hives and Rash     Anesthetics, Lorna Rash       All Meds and Allergies reviewed in the record at the facility and is the most up-to-date.    Current Outpatient Medications   Medication Sig     acetaminophen (TYLENOL) 325 MG tablet Take 650 mg by mouth 3 times daily     aspirin 81 MG EC tablet Take 81 mg by mouth daily      "atorvastatin (LIPITOR) 80 MG tablet Take 80 mg by mouth at bedtime     clopidogrel (PLAVIX) 75 MG tablet Take 75 mg by mouth daily     escitalopram (LEXAPRO) 5 MG tablet Take 5 mg by mouth daily Start 5 mg PO daily X 1 week then increase to 10 mg daily.     gabapentin (NEURONTIN) 100 MG capsule Take 200 mg by mouth at bedtime     isosorbide dinitrate (ISORDIL) 5 MG tablet Take 5 mg by mouth 3 times daily     metoprolol tartrate (LOPRESSOR) 25 MG tablet Take 25 mg by mouth 2 times daily     nitroGLYcerin (NITROSTAT) 0.4 MG sublingual tablet Place 0.4 mg under the tongue every 5 minutes as needed for chest pain For chest pain place 1 tablet under the tongue every 5 minutes for 3 doses. If symptoms persist 5 minutes after 1st dose call 911.     QUEtiapine (SEROQUEL) 25 MG tablet Take 12.5 mg by mouth 2 times daily     Suvorexant (BELSOMRA) 10 MG tablet Take 10 mg by mouth nightly as needed for sleep     No current facility-administered medications for this visit.       REVIEW OF SYSTEMS:   10 point review of systems reviewed and pertinent positives in the HPI.     PHYSICAL EXAMINATION:  Physical Exam     Vital signs: /64   Pulse 73   Temp 97.8  F (36.6  C)   Resp 17   Ht 1.778 m (5' 10\")   Wt 78.2 kg (172 lb 8 oz)   SpO2 95%   BMI 24.75 kg/m    General: Alert, awake, oriented x2, lying in bed,  follows simple commands at times   HEENT:Pink conjunctiva, dry oral mucosa, left side facial droop. Speech clear.   NECK: Supple  CARDIOVASCULAR: S1, S2 without murmur or gallop.   RESPIRATORY: No wheezes, rale or rhonchi.   BACK: No kyphosis of the thoracic spine  EXTREMITIES: Left side hemiparesis, no pedal edema.  Move RLL when palpating LLE.   SKIN: Warm and dry  NEUROLOGIC: See above, pulses palpable.  PSYCHIATRIC: Depression. Flat affect.       Labs:  All labs reviewed in the nursing home record and [facility]     This note has been dictated using voice recognition software. Any grammatical or context distortions " are unintentional and inherent to the software    Electronically signed by: Essence Conteh CNP       Sincerely,        Essence Conteh NP

## 2024-02-22 ENCOUNTER — TRANSITIONAL CARE UNIT VISIT (OUTPATIENT)
Dept: GERIATRICS | Facility: CLINIC | Age: 74
End: 2024-02-22
Payer: COMMERCIAL

## 2024-02-22 VITALS
OXYGEN SATURATION: 99 % | WEIGHT: 172.5 LBS | RESPIRATION RATE: 14 BRPM | SYSTOLIC BLOOD PRESSURE: 144 MMHG | HEART RATE: 59 BPM | TEMPERATURE: 98.7 F | DIASTOLIC BLOOD PRESSURE: 62 MMHG | HEIGHT: 70 IN | BODY MASS INDEX: 24.69 KG/M2

## 2024-02-22 DIAGNOSIS — I69.398 SPASTICITY AS LATE EFFECT OF CEREBROVASCULAR ACCIDENT (CVA): ICD-10-CM

## 2024-02-22 DIAGNOSIS — R45.1 AGITATION: ICD-10-CM

## 2024-02-22 DIAGNOSIS — R25.2 SPASTICITY AS LATE EFFECT OF CEREBROVASCULAR ACCIDENT (CVA): ICD-10-CM

## 2024-02-22 DIAGNOSIS — F34.1 PERSISTENT DEPRESSIVE DISORDER WITH ANXIOUS DISTRESS, CURRENTLY SEVERE: ICD-10-CM

## 2024-02-22 DIAGNOSIS — I63.531 ACUTE ISCHEMIC RIGHT POSTERIOR CEREBRAL ARTERY (PCA) STROKE (H): Primary | ICD-10-CM

## 2024-02-22 DIAGNOSIS — I25.118 CORONARY ARTERY DISEASE OF NATIVE ARTERY OF NATIVE HEART WITH STABLE ANGINA PECTORIS (H): ICD-10-CM

## 2024-02-22 PROCEDURE — 99309 SBSQ NF CARE MODERATE MDM 30: CPT | Performed by: NURSE PRACTITIONER

## 2024-02-22 NOTE — LETTER
2/22/2024        RE: Shaji Markham  [address] [facility] [address]        [facility] GERIATRIC [facility]    Code Status:  DNR   Visit Type:   Chief Complaint   Patient presents with     TCU Follow Up     Facility:  [facility]) [[id number]]           HPI: Shaji Markham is a 73 year old male who I am seeing today for follow up on the TCU. 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  Patient hospitalized on 1/4/2024 due to left-sided weakness and right gaze following cardiac catheterization.  Patient underwent a coronary angiogram on 1/4/2024 in the Cath Lab at .  He received a heparin bolus.  After the procedure was done staff noted the patient exhibiting right gaze deviation.  He also endorsed left Heema body numbness and weakness he was transported to the ED accompanied by the neurologist.  In the ED tPA not indicated as patient had already received heparin.  CT was not indicated of LVO so no endovascular treatment was pursued.  Patient given aspirin suppository.  MRI of the brain showed restricted diffusion defect in the right hippocampal body and tail likely representing acute ischemia.  Repeat MRI of the brain 1/5/2024 demonstrated increased size of the recent right PCA territory infarct involving the hippocampus and occipital lobe with mild localized mass effect.  Possible trace SAH overlying the right middle lobe.  EEG negative.  Vascular surgery consulted for carotid disease.  Severe greater than 70% stenosis of bilateral carotid arteries.  Vascular surgery recommended aspirin with possible carotid artery revascularization in the future.  DAPT and statin therapy.  Vascular recommended 3-month follow-up with repeat carotid artery ultrasound.  Cardiothoracic surgery following.  Patient underwent coronary angiogram on 1/4/2024  which showed multivessel CAD including 70% proximal LAD disease.  CT surgery recommended CABG versus complex percutaneous coronary intervention.  Neurology recommended at least 4 weeks before considering any intervention.  Patient with suicidal thoughts and ideation during hospitalization.  Psychiatric consulted.  Patient found to have a general disorder with depressed mood.  Worsened left-sided weakness on 1/9/2024.  Repeat head CT showed evolving stroke.  Patient with dense left hemiplegia. Therapy reporting occ. Increased spasticity.     Transitional Care Course: Today patient sitting up in wheelchair. Pt with recent CVA with left sided hemiparesis. Therapy reporting increased spasticity post CVA. He is gaining some movement back in his LLE. Pt with depression with anxiety. He was recently switched from Remeron to Lexapro. He has also had some aggitation including turning over furniture and throwing things in dining room. He continues on Seroquel. Pt being followed by ACP.       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  -Brain MRI showed right PCA stroke.  -Video EEG with no seizure.  -Tylenol 650 mg TID.   -Continue gabapentin 200 mg nightly.   -ST following.  -Start tizanidine 2 mg daily.      Agitation  -stroke induced.   -Continue Seroquel to 12.5 mg BID.     Depression   -Continue Lexapro 10 mg daily.   -pt with suicidal ideation in hospital. Suicidal thought present.  Patient has no plans.   -ACP following.     DANIA on CKD, stage 3.   -Baseline creatinine 1.20-1.7.  -Additional 240 ml with each med pass.   -assist with all meals.     Coronary artery disease of native artery of native heart with stable angina pectoris  (H24)  -Status post coronary angiogram on 1/4/2024 revealing multivessel coronary artery disease.  -CV surgery following.  Initially CABG was recommended now cardiology is planning possible PCI.   -Hold off for 4 weeks per neurology recommendations  -Continue aspirin and statin.  -Echocardiogram showed ejection fracture of 59%.  -Continue Isosorbide.     Active Ambulatory Problems     Diagnosis Date Noted     Abnormal stress test 01/04/2024     Acute ischemic right posterior cerebral artery (PCA) stroke (H) 01/04/2024     Cerebrovascular accident (CVA) due to occlusion of left posterior cerebral artery (H) 07/04/2022     Type 2 diabetes mellitus (H) 02/16/2017     Tinnitus, bilateral 04/04/2022     Stage 3b chronic kidney disease (H) 03/13/2021     Onychomycosis of right great toe 04/05/2023     NAFLD (nonalcoholic fatty liver disease) 10/09/2019     Mixed dyslipidemia 05/05/2011     Elevated lipoprotein(a) 04/06/2022     Coronary artery disease of native artery of native heart with stable angina pectoris (H24) 01/04/2024     Visceral obesity 11/13/2020     Periodontitis 12/06/2023     Resolved Ambulatory Problems     Diagnosis Date Noted     No Resolved Ambulatory Problems     No Additional Past Medical History     Allergies   Allergen Reactions     Latex Hives and Rash     Anesthetics, Lorna Rash       All Meds and Allergies reviewed in the record at the facility and is the most up-to-date.    Current Outpatient Medications   Medication Sig     tiZANidine (ZANAFLEX) 2 MG capsule Take 2 mg by mouth daily     acetaminophen (TYLENOL) 325 MG tablet Take 650 mg by mouth 3 times daily     aspirin 81 MG EC tablet Take 81 mg by mouth daily     atorvastatin (LIPITOR) 80 MG tablet Take 80 mg by mouth at bedtime     clopidogrel (PLAVIX) 75 MG tablet Take 75 mg by mouth daily     escitalopram (LEXAPRO) 5 MG tablet Take 5 mg by mouth daily Start 5 mg PO daily X 1 week then increase to 10 mg daily.     gabapentin (NEURONTIN)  "100 MG capsule Take 200 mg by mouth at bedtime     isosorbide dinitrate (ISORDIL) 5 MG tablet Take 5 mg by mouth 3 times daily     metoprolol tartrate (LOPRESSOR) 25 MG tablet Take 25 mg by mouth 2 times daily     nitroGLYcerin (NITROSTAT) 0.4 MG sublingual tablet Place 0.4 mg under the tongue every 5 minutes as needed for chest pain For chest pain place 1 tablet under the tongue every 5 minutes for 3 doses. If symptoms persist 5 minutes after 1st dose call 911.     QUEtiapine (SEROQUEL) 25 MG tablet Take 12.5 mg by mouth 2 times daily     Suvorexant (BELSOMRA) 10 MG tablet Take 10 mg by mouth nightly as needed for sleep     No current facility-administered medications for this visit.       REVIEW OF SYSTEMS:   10 point review of systems reviewed and pertinent positives in the HPI.     PHYSICAL EXAMINATION:  Physical Exam     Vital signs: BP (!) 144/62   Pulse 59   Temp 98.7  F (37.1  C)   Resp 14   Ht 1.778 m (5' 10\")   Wt 78.2 kg (172 lb 8 oz)   SpO2 99%   BMI 24.75 kg/m    General: Alert, awake, oriented x2, lying in bed,  follows simple commands at times   HEENT:Pink conjunctiva, dry oral mucosa, left side facial droop. Speech clear.   NECK: Supple  BACK: No kyphosis of the thoracic spine  EXTREMITIES: Left side hemiparesis, no pedal edema. Slight movement in LLL.   SKIN: Warm and dry  NEUROLOGIC: See above, pulses palpable.  PSYCHIATRIC: Flat affect.       Labs:  All labs reviewed in the nursing home record and [facility]     This note has been dictated using voice recognition software. Any grammatical or context distortions are unintentional and inherent to the software    Electronically signed by: Essence Conteh CNP       Sincerely,        Essence Conteh NP      "

## 2024-02-25 RX ORDER — TIZANIDINE HYDROCHLORIDE 2 MG/1
2 CAPSULE, GELATIN COATED ORAL DAILY
COMMUNITY

## 2024-02-26 NOTE — PROGRESS NOTES
[facility] GERIATRIC SERVICES    Code Status:  DNR   Visit Type:   Chief Complaint   Patient presents with    TCU Follow Up     Facility:  [facility] ([facility]) [[id number]]           HPI: Shaji Markham is a 73 year old male who I am seeing today for follow up on the TCU. 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  Patient hospitalized on 1/4/2024 due to left-sided weakness and right gaze following cardiac catheterization.  Patient underwent a coronary angiogram on 1/4/2024 in the Cath Lab at [facility].  He received a heparin bolus.  After the procedure was done staff noted the patient exhibiting right gaze deviation.  He also endorsed left Heema body numbness and weakness he was transported to the ED accompanied by the neurologist.  In the ED tPA not indicated as patient had already received heparin.  CT was not indicated of LVO so no endovascular treatment was pursued.  Patient given aspirin suppository.  MRI of the brain showed restricted diffusion defect in the right hippocampal body and tail likely representing acute ischemia.  Repeat MRI of the brain 1/5/2024 demonstrated increased size of the recent right PCA territory infarct involving the hippocampus and occipital lobe with mild localized mass effect.  Possible trace SAH overlying the right middle lobe.  EEG negative.  Vascular surgery consulted for carotid disease.  Severe greater than 70% stenosis of bilateral carotid arteries.  Vascular surgery recommended aspirin with possible carotid artery revascularization in the future.  DAPT and statin therapy.  Vascular recommended 3-month follow-up with repeat carotid artery ultrasound.  Cardiothoracic surgery following.  Patient underwent coronary angiogram on 1/4/2024 which showed multivessel CAD including 70% proximal LAD disease.  CT surgery  recommended CABG versus complex percutaneous coronary intervention.  Neurology recommended at least 4 weeks before considering any intervention.  Patient with suicidal thoughts and ideation during hospitalization.  Psychiatric consulted.  Patient found to have a general disorder with depressed mood.  Worsened left-sided weakness on 1/9/2024.  Repeat head CT showed evolving stroke.  Patient with dense left hemiplegia. Therapy reporting occ. Increased spasticity.     Transitional Care Course: Today patient sitting up in wheelchair. Pt with recent CVA with left sided hemiparesis. Therapy reporting increased spasticity post CVA. He is gaining some movement back in his LLE. Pt with depression with anxiety. He was recently switched from Remeron to Lexapro. He has also had some aggitation including turning over furniture and throwing things in dining room. He continues on Seroquel. Pt being followed by ACP.       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  -Brain MRI showed right PCA stroke.  -Video EEG with no seizure.  -Tylenol 650 mg TID.   -Continue gabapentin 200 mg nightly.   -ST following.  -Start tizanidine 2 mg daily.      Agitation  -stroke induced.   -Continue Seroquel to 12.5 mg BID.     Depression   -Continue Lexapro 10 mg daily.   -pt with suicidal ideation in hospital. Suicidal thought present.  Patient has no plans.   -ACP following.     DANIA on CKD, stage 3.   -Baseline creatinine 1.20-1.7.  -Additional 240 ml with each med pass.   -assist with all meals.     Coronary artery disease of native artery of native heart with stable angina pectoris (H24)  -Status post coronary angiogram on 1/4/2024 revealing multivessel  coronary artery disease.  -CV surgery following.  Initially CABG was recommended now cardiology is planning possible PCI.   -Hold off for 4 weeks per neurology recommendations  -Continue aspirin and statin.  -Echocardiogram showed ejection fracture of 59%.  -Continue Isosorbide.     Active Ambulatory Problems     Diagnosis Date Noted    Abnormal stress test 01/04/2024    Acute ischemic right posterior cerebral artery (PCA) stroke (H) 01/04/2024    Cerebrovascular accident (CVA) due to occlusion of left posterior cerebral artery (H) 07/04/2022    Type 2 diabetes mellitus (H) 02/16/2017    Tinnitus, bilateral 04/04/2022    Stage 3b chronic kidney disease (H) 03/13/2021    Onychomycosis of right great toe 04/05/2023    NAFLD (nonalcoholic fatty liver disease) 10/09/2019    Mixed dyslipidemia 05/05/2011    Elevated lipoprotein(a) 04/06/2022    Coronary artery disease of native artery of native heart with stable angina pectoris (H24) 01/04/2024    Visceral obesity 11/13/2020    Periodontitis 12/06/2023     Resolved Ambulatory Problems     Diagnosis Date Noted    No Resolved Ambulatory Problems     No Additional Past Medical History     Allergies   Allergen Reactions    Latex Hives and Rash    Anesthetics, Lorna Rash       All Meds and Allergies reviewed in the record at the facility and is the most up-to-date.    Current Outpatient Medications   Medication Sig    tiZANidine (ZANAFLEX) 2 MG capsule Take 2 mg by mouth daily    acetaminophen (TYLENOL) 325 MG tablet Take 650 mg by mouth 3 times daily    aspirin 81 MG EC tablet Take 81 mg by mouth daily    atorvastatin (LIPITOR) 80 MG tablet Take 80 mg by mouth at bedtime    clopidogrel (PLAVIX) 75 MG tablet Take 75 mg by mouth daily    escitalopram (LEXAPRO) 5 MG tablet Take 5 mg by mouth daily Start 5 mg PO daily X 1 week then increase to 10 mg daily.    gabapentin (NEURONTIN) 100 MG capsule Take 200 mg by mouth at bedtime    isosorbide dinitrate (ISORDIL) 5 MG tablet Take  "5 mg by mouth 3 times daily    metoprolol tartrate (LOPRESSOR) 25 MG tablet Take 25 mg by mouth 2 times daily    nitroGLYcerin (NITROSTAT) 0.4 MG sublingual tablet Place 0.4 mg under the tongue every 5 minutes as needed for chest pain For chest pain place 1 tablet under the tongue every 5 minutes for 3 doses. If symptoms persist 5 minutes after 1st dose call 911.    QUEtiapine (SEROQUEL) 25 MG tablet Take 12.5 mg by mouth 2 times daily    Suvorexant (BELSOMRA) 10 MG tablet Take 10 mg by mouth nightly as needed for sleep     No current facility-administered medications for this visit.       REVIEW OF SYSTEMS:   10 point review of systems reviewed and pertinent positives in the HPI.     PHYSICAL EXAMINATION:  Physical Exam     Vital signs: BP (!) 144/62   Pulse 59   Temp 98.7  F (37.1  C)   Resp 14   Ht 1.778 m (5' 10\")   Wt 78.2 kg (172 lb 8 oz)   SpO2 99%   BMI 24.75 kg/m    General: Alert, awake, oriented x2, lying in bed,  follows simple commands at times   HEENT:Pink conjunctiva, dry oral mucosa, left side facial droop. Speech clear.   NECK: Supple  BACK: No kyphosis of the thoracic spine  EXTREMITIES: Left side hemiparesis, no pedal edema. Slight movement in LLL.   SKIN: Warm and dry  NEUROLOGIC: See above, pulses palpable.  PSYCHIATRIC: Flat affect.       Labs:  All labs reviewed in the nursing home record and Epic     This note has been dictated using voice recognition software. Any grammatical or context distortions are unintentional and inherent to the software    Electronically signed by: Essence Conteh CNP   "

## 2024-02-27 ENCOUNTER — TRANSITIONAL CARE UNIT VISIT (OUTPATIENT)
Dept: GERIATRICS | Facility: CLINIC | Age: 74
End: 2024-02-27
Payer: COMMERCIAL

## 2024-02-27 VITALS
SYSTOLIC BLOOD PRESSURE: 117 MMHG | DIASTOLIC BLOOD PRESSURE: 68 MMHG | OXYGEN SATURATION: 96 % | HEIGHT: 70 IN | HEART RATE: 56 BPM | TEMPERATURE: 98.1 F | WEIGHT: 167.2 LBS | RESPIRATION RATE: 16 BRPM | BODY MASS INDEX: 23.94 KG/M2

## 2024-02-27 DIAGNOSIS — R25.2 SPASTICITY AS LATE EFFECT OF CEREBROVASCULAR ACCIDENT (CVA): ICD-10-CM

## 2024-02-27 DIAGNOSIS — I25.118 CORONARY ARTERY DISEASE OF NATIVE ARTERY OF NATIVE HEART WITH STABLE ANGINA PECTORIS (H): ICD-10-CM

## 2024-02-27 DIAGNOSIS — I63.531 ACUTE ISCHEMIC RIGHT POSTERIOR CEREBRAL ARTERY (PCA) STROKE (H): Primary | ICD-10-CM

## 2024-02-27 DIAGNOSIS — R45.1 AGITATION: ICD-10-CM

## 2024-02-27 DIAGNOSIS — F34.1 PERSISTENT DEPRESSIVE DISORDER WITH ANXIOUS DISTRESS, CURRENTLY SEVERE: ICD-10-CM

## 2024-02-27 DIAGNOSIS — E11.00 TYPE 2 DIABETES MELLITUS WITH HYPEROSMOLARITY WITHOUT COMA, UNSPECIFIED WHETHER LONG TERM INSULIN USE (H): ICD-10-CM

## 2024-02-27 DIAGNOSIS — I69.398 SPASTICITY AS LATE EFFECT OF CEREBROVASCULAR ACCIDENT (CVA): ICD-10-CM

## 2024-02-27 PROCEDURE — 99309 SBSQ NF CARE MODERATE MDM 30: CPT | Performed by: NURSE PRACTITIONER

## 2024-02-27 NOTE — LETTER
2/27/2024        RE: Shaji Markham  [id number] [facility] [facility] [place] [id number]        [facility] GERIATRIC SERVICES    Code Status:  DNR   Visit Type:   Chief Complaint   Patient presents with     TCU Follow Up     Facility:  [facility]) [[id number]]           HPI: Shaji Markham is a 73 year old male who I am seeing today for follow up on the TCU. 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  Patient hospitalized on 1/4/2024 due to left-sided weakness and right gaze following cardiac catheterization.  Patient underwent a coronary angiogram on 1/4/2024 in the Cath Lab at [facility].  He received a heparin bolus.  After the procedure was done staff noted the patient exhibiting right gaze deviation.  He also endorsed left Heema body numbness and weakness he was transported to the ED accompanied by the neurologist.  In the ED tPA not indicated as patient had already received heparin.  CT was not indicated of LVO so no endovascular treatment was pursued.  Patient given aspirin suppository.  MRI of the brain showed restricted diffusion defect in the right hippocampal body and tail likely representing acute ischemia.  Repeat MRI of the brain 1/5/2024 demonstrated increased size of the recent right PCA territory infarct involving the hippocampus and occipital lobe with mild localized mass effect.  Possible trace SAH overlying the right middle lobe.  EEG negative.  Vascular surgery consulted for carotid disease.  Severe greater than 70% stenosis of bilateral carotid arteries.  Vascular surgery recommended aspirin with possible carotid artery revascularization in the future.  DAPT and statin therapy.  Vascular recommended 3-month follow-up with repeat carotid artery ultrasound.  Cardiothoracic surgery following.  Patient underwent coronary angiogram on 1/4/2024  which showed multivessel CAD including 70% proximal LAD disease.  CT surgery recommended CABG versus complex percutaneous coronary intervention.  Neurology recommended at least 4 weeks before considering any intervention.  Patient with suicidal thoughts and ideation during hospitalization.  Psychiatric consulted.  Patient found to have a general disorder with depressed mood.  Worsened left-sided weakness on 1/9/2024.  Repeat head CT showed evolving stroke.  Patient with dense left hemiplegia. Therapy reporting occ. Increased spasticity.     Transitional Care Course: Today patient sitting up in wheelchair. Pt with recent CVA with left sided hemiparesis. Pt recently started on tizanidine for spasticity from CVA. Pt is making gains in therapy. He has increased movement in his LLL. He is receiving diathermy. He continues with decreased movement in his ULE. Depression with anxiety. Pt continues on Lexapro. He continues with occ periods of agitation, including throwing items. He continues on Seroquel. Pt being followed by ACP.       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  -Brain MRI showed right PCA stroke.  -Video EEG with no seizure.  -Tylenol 650 mg TID.   -Continue gabapentin 200 mg nightly.   -ST following.  -Continue tizanidine 2 mg daily.      Agitation  -stroke induced.   -Continue Seroquel to 12.5 mg BID.     Depression   -Continue Lexapro 10 mg daily.   -pt with suicidal ideation in hospital. No recent suicidal thoughts.   -ACP following.     DANIA on CKD, stage 3.   -Baseline creatinine 1.20-1.7.  -Additional 240 ml with each med pass.     Coronary artery disease of native artery of native heart with stable angina  pectoris (H24)  -Status post coronary angiogram on 1/4/2024 revealing multivessel coronary artery disease.  -CV surgery following.  Initially CABG was recommended now cardiology is planning possible PCI.   -Hold off for 4 weeks per neurology recommendations  -Continue aspirin and statin.  -Echocardiogram showed ejection fracture of 59%.  -Continue Isosorbide.     Active Ambulatory Problems     Diagnosis Date Noted     Abnormal stress test 01/04/2024     Acute ischemic right posterior cerebral artery (PCA) stroke (H) 01/04/2024     Cerebrovascular accident (CVA) due to occlusion of left posterior cerebral artery (H) 07/04/2022     Type 2 diabetes mellitus (H) 02/16/2017     Tinnitus, bilateral 04/04/2022     Stage 3b chronic kidney disease (H) 03/13/2021     Onychomycosis of right great toe 04/05/2023     NAFLD (nonalcoholic fatty liver disease) 10/09/2019     Mixed dyslipidemia 05/05/2011     Elevated lipoprotein(a) 04/06/2022     Coronary artery disease of native artery of native heart with stable angina pectoris (H24) 01/04/2024     Visceral obesity 11/13/2020     Periodontitis 12/06/2023     Resolved Ambulatory Problems     Diagnosis Date Noted     No Resolved Ambulatory Problems     No Additional Past Medical History     Allergies   Allergen Reactions     Latex Hives and Rash     Anesthetics, Lorna Rash       All Meds and Allergies reviewed in the record at the facility and is the most up-to-date.    Current Outpatient Medications   Medication Sig     acetaminophen (TYLENOL) 325 MG tablet Take 650 mg by mouth 3 times daily     aspirin 81 MG EC tablet Take 81 mg by mouth daily     atorvastatin (LIPITOR) 80 MG tablet Take 80 mg by mouth at bedtime     clopidogrel (PLAVIX) 75 MG tablet Take 75 mg by mouth daily     escitalopram (LEXAPRO) 5 MG tablet Take 5 mg by mouth daily Start 5 mg PO daily X 1 week then increase to 10 mg daily.     gabapentin (NEURONTIN) 100 MG capsule Take 200 mg by mouth at bedtime      "isosorbide dinitrate (ISORDIL) 5 MG tablet Take 5 mg by mouth 3 times daily     metoprolol tartrate (LOPRESSOR) 25 MG tablet Take 25 mg by mouth 2 times daily     nitroGLYcerin (NITROSTAT) 0.4 MG sublingual tablet Place 0.4 mg under the tongue every 5 minutes as needed for chest pain For chest pain place 1 tablet under the tongue every 5 minutes for 3 doses. If symptoms persist 5 minutes after 1st dose call 911.     QUEtiapine (SEROQUEL) 25 MG tablet Take 12.5 mg by mouth 2 times daily     Suvorexant (BELSOMRA) 10 MG tablet Take 10 mg by mouth nightly as needed for sleep     tiZANidine (ZANAFLEX) 2 MG capsule Take 2 mg by mouth daily     No current facility-administered medications for this visit.       REVIEW OF SYSTEMS:   10 point review of systems reviewed and pertinent positives in the HPI.     PHYSICAL EXAMINATION:  Physical Exam     Vital signs: /68   Pulse 56   Temp 98.1  F (36.7  C)   Resp 16   Ht 1.778 m (5' 10\")   Wt 75.8 kg (167 lb 3.2 oz)   SpO2 96%   BMI 23.99 kg/m    General: Alert, awake, oriented x2, lying in bed,  follows simple commands at times   HEENT:Pink conjunctiva, dry oral mucosa, left side facial droop. Speech clear.   NECK: Supple  BACK: No kyphosis of the thoracic spine  EXTREMITIES: Left side hemiparesis, no pedal edema. Increased movement in LLL. Pt able to self propel wheelchair.   SKIN: Warm and dry  NEUROLOGIC: See above, pulses palpable.  PSYCHIATRIC: pleasant affect.       Labs:  All labs reviewed in the nursing home record and [facility]     This note has been dictated using voice recognition software. Any grammatical or context distortions are unintentional and inherent to the software    Electronically signed by: Essence Conteh CNP       Sincerely,        Essence Conteh NP      "

## 2024-02-27 NOTE — PROGRESS NOTES
[facility] GERIATRIC SERVICES    Code Status:  DNR   Visit Type:   Chief Complaint   Patient presents with    TCU Follow Up     Facility:  [facility] ([facility]) [[id number]]           HPI: Shaji Markham is a 73 year old male who I am seeing today for follow up on the TCU. 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  Patient hospitalized on 1/4/2024 due to left-sided weakness and right gaze following cardiac catheterization.  Patient underwent a coronary angiogram on 1/4/2024 in the Cath Lab at [facility].  He received a heparin bolus.  After the procedure was done staff noted the patient exhibiting right gaze deviation.  He also endorsed left Heema body numbness and weakness he was transported to the ED accompanied by the neurologist.  In the ED tPA not indicated as patient had already received heparin.  CT was not indicated of LVO so no endovascular treatment was pursued.  Patient given aspirin suppository.  MRI of the brain showed restricted diffusion defect in the right hippocampal body and tail likely representing acute ischemia.  Repeat MRI of the brain 1/5/2024 demonstrated increased size of the recent right PCA territory infarct involving the hippocampus and occipital lobe with mild localized mass effect.  Possible trace SAH overlying the right middle lobe.  EEG negative.  Vascular surgery consulted for carotid disease.  Severe greater than 70% stenosis of bilateral carotid arteries.  Vascular surgery recommended aspirin with possible carotid artery revascularization in the future.  DAPT and statin therapy.  Vascular recommended 3-month follow-up with repeat carotid artery ultrasound.  Cardiothoracic surgery following.  Patient underwent coronary angiogram on 1/4/2024 which showed multivessel CAD including 70% proximal LAD disease.  CT surgery  recommended CABG versus complex percutaneous coronary intervention.  Neurology recommended at least 4 weeks before considering any intervention.  Patient with suicidal thoughts and ideation during hospitalization.  Psychiatric consulted.  Patient found to have a general disorder with depressed mood.  Worsened left-sided weakness on 1/9/2024.  Repeat head CT showed evolving stroke.  Patient with dense left hemiplegia. Therapy reporting occ. Increased spasticity.     Transitional Care Course: Today patient sitting up in wheelchair. Pt with recent CVA with left sided hemiparesis. Pt recently started on tizanidine for spasticity from CVA. Pt is making gains in therapy. He has increased movement in his LLL. He is receiving diathermy. He continues with decreased movement in his ULE. Depression with anxiety. Pt continues on Lexapro. He continues with occ periods of agitation, including throwing items. He continues on Seroquel. Pt being followed by ACP.       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  -Brain MRI showed right PCA stroke.  -Video EEG with no seizure.  -Tylenol 650 mg TID.   -Continue gabapentin 200 mg nightly.   -ST following.  -Continue tizanidine 2 mg daily.      Agitation  -stroke induced.   -Continue Seroquel to 12.5 mg BID.     Depression   -Continue Lexapro 10 mg daily.   -pt with suicidal ideation in hospital. No recent suicidal thoughts.   -ACP following.     DANIA on CKD, stage 3.   -Baseline creatinine 1.20-1.7.  -Additional 240 ml with each med pass.     Coronary artery disease of native artery of native heart with stable angina pectoris (H24)  -Status post coronary angiogram on 1/4/2024 revealing multivessel  coronary artery disease.  -CV surgery following.  Initially CABG was recommended now cardiology is planning possible PCI.   -Hold off for 4 weeks per neurology recommendations  -Continue aspirin and statin.  -Echocardiogram showed ejection fracture of 59%.  -Continue Isosorbide.     Active Ambulatory Problems     Diagnosis Date Noted    Abnormal stress test 01/04/2024    Acute ischemic right posterior cerebral artery (PCA) stroke (H) 01/04/2024    Cerebrovascular accident (CVA) due to occlusion of left posterior cerebral artery (H) 07/04/2022    Type 2 diabetes mellitus (H) 02/16/2017    Tinnitus, bilateral 04/04/2022    Stage 3b chronic kidney disease (H) 03/13/2021    Onychomycosis of right great toe 04/05/2023    NAFLD (nonalcoholic fatty liver disease) 10/09/2019    Mixed dyslipidemia 05/05/2011    Elevated lipoprotein(a) 04/06/2022    Coronary artery disease of native artery of native heart with stable angina pectoris (H24) 01/04/2024    Visceral obesity 11/13/2020    Periodontitis 12/06/2023     Resolved Ambulatory Problems     Diagnosis Date Noted    No Resolved Ambulatory Problems     No Additional Past Medical History     Allergies   Allergen Reactions    Latex Hives and Rash    Anesthetics, Lorna Rash       All Meds and Allergies reviewed in the record at the facility and is the most up-to-date.    Current Outpatient Medications   Medication Sig    acetaminophen (TYLENOL) 325 MG tablet Take 650 mg by mouth 3 times daily    aspirin 81 MG EC tablet Take 81 mg by mouth daily    atorvastatin (LIPITOR) 80 MG tablet Take 80 mg by mouth at bedtime    clopidogrel (PLAVIX) 75 MG tablet Take 75 mg by mouth daily    escitalopram (LEXAPRO) 5 MG tablet Take 5 mg by mouth daily Start 5 mg PO daily X 1 week then increase to 10 mg daily.    gabapentin (NEURONTIN) 100 MG capsule Take 200 mg by mouth at bedtime    isosorbide dinitrate (ISORDIL) 5 MG tablet Take 5 mg by mouth 3 times daily    metoprolol tartrate (LOPRESSOR)  "25 MG tablet Take 25 mg by mouth 2 times daily    nitroGLYcerin (NITROSTAT) 0.4 MG sublingual tablet Place 0.4 mg under the tongue every 5 minutes as needed for chest pain For chest pain place 1 tablet under the tongue every 5 minutes for 3 doses. If symptoms persist 5 minutes after 1st dose call 911.    QUEtiapine (SEROQUEL) 25 MG tablet Take 12.5 mg by mouth 2 times daily    Suvorexant (BELSOMRA) 10 MG tablet Take 10 mg by mouth nightly as needed for sleep    tiZANidine (ZANAFLEX) 2 MG capsule Take 2 mg by mouth daily     No current facility-administered medications for this visit.       REVIEW OF SYSTEMS:   10 point review of systems reviewed and pertinent positives in the HPI.     PHYSICAL EXAMINATION:  Physical Exam     Vital signs: /68   Pulse 56   Temp 98.1  F (36.7  C)   Resp 16   Ht 1.778 m (5' 10\")   Wt 75.8 kg (167 lb 3.2 oz)   SpO2 96%   BMI 23.99 kg/m    General: Alert, awake, oriented x2, lying in bed,  follows simple commands at times   HEENT:Pink conjunctiva, dry oral mucosa, left side facial droop. Speech clear.   NECK: Supple  BACK: No kyphosis of the thoracic spine  EXTREMITIES: Left side hemiparesis, no pedal edema. Increased movement in LLL. Pt able to self propel wheelchair.   SKIN: Warm and dry  NEUROLOGIC: See above, pulses palpable.  PSYCHIATRIC: pleasant affect.       Labs:  All labs reviewed in the nursing home record and [facility]     This note has been dictated using voice recognition software. Any grammatical or context distortions are unintentional and inherent to the software    Electronically signed by: Essence Conteh CNP   "

## 2024-02-29 ENCOUNTER — TRANSITIONAL CARE UNIT VISIT (OUTPATIENT)
Dept: GERIATRICS | Facility: CLINIC | Age: 74
End: 2024-02-29
Payer: COMMERCIAL

## 2024-02-29 VITALS
TEMPERATURE: 98 F | RESPIRATION RATE: 18 BRPM | DIASTOLIC BLOOD PRESSURE: 78 MMHG | OXYGEN SATURATION: 99 % | HEART RATE: 72 BPM | SYSTOLIC BLOOD PRESSURE: 160 MMHG | HEIGHT: 70 IN | BODY MASS INDEX: 23.94 KG/M2 | WEIGHT: 167.2 LBS

## 2024-02-29 DIAGNOSIS — R45.1 AGITATION: ICD-10-CM

## 2024-02-29 DIAGNOSIS — F34.1 PERSISTENT DEPRESSIVE DISORDER WITH ANXIOUS DISTRESS, CURRENTLY SEVERE: ICD-10-CM

## 2024-02-29 DIAGNOSIS — R25.2 SPASTICITY AS LATE EFFECT OF CEREBROVASCULAR ACCIDENT (CVA): ICD-10-CM

## 2024-02-29 DIAGNOSIS — I69.398 SPASTICITY AS LATE EFFECT OF CEREBROVASCULAR ACCIDENT (CVA): ICD-10-CM

## 2024-02-29 DIAGNOSIS — I63.531 ACUTE ISCHEMIC RIGHT POSTERIOR CEREBRAL ARTERY (PCA) STROKE (H): Primary | ICD-10-CM

## 2024-02-29 DIAGNOSIS — E11.00 TYPE 2 DIABETES MELLITUS WITH HYPEROSMOLARITY WITHOUT COMA, UNSPECIFIED WHETHER LONG TERM INSULIN USE (H): ICD-10-CM

## 2024-02-29 DIAGNOSIS — I25.118 CORONARY ARTERY DISEASE OF NATIVE ARTERY OF NATIVE HEART WITH STABLE ANGINA PECTORIS (H): ICD-10-CM

## 2024-02-29 PROCEDURE — 99309 SBSQ NF CARE MODERATE MDM 30: CPT | Performed by: NURSE PRACTITIONER

## 2024-03-01 NOTE — PROGRESS NOTES
[facility] GERIATRIC SERVICES    Code Status:  DNR   Visit Type:   Chief Complaint   Patient presents with    TCU Follow Up     Facility:  [facility] ([facility]) [[id number]]           HPI: Shaji Markham is a 73 year old male who I am seeing today for follow up on the TCU. 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  Patient hospitalized on 1/4/2024 due to left-sided weakness and right gaze following cardiac catheterization.  Patient underwent a coronary angiogram on 1/4/2024 in the Cath Lab at [facility].  He received a heparin bolus.  After the procedure was done staff noted the patient exhibiting right gaze deviation.  He also endorsed left Heema body numbness and weakness he was transported to the ED accompanied by the neurologist.  In the ED tPA not indicated as patient had already received heparin.  CT was not indicated of LVO so no endovascular treatment was pursued.  Patient given aspirin suppository.  MRI of the brain showed restricted diffusion defect in the right hippocampal body and tail likely representing acute ischemia.  Repeat MRI of the brain 1/5/2024 demonstrated increased size of the recent right PCA territory infarct involving the hippocampus and occipital lobe with mild localized mass effect.  Possible trace SAH overlying the right middle lobe.  EEG negative.  Vascular surgery consulted for carotid disease.  Severe greater than 70% stenosis of bilateral carotid arteries.  Vascular surgery recommended aspirin with possible carotid artery revascularization in the future.  DAPT and statin therapy.  Vascular recommended 3-month follow-up with repeat carotid artery ultrasound.  Cardiothoracic surgery following.  Patient underwent coronary angiogram on 1/4/2024 which showed multivessel CAD including 70% proximal LAD disease.  CT surgery  recommended CABG versus complex percutaneous coronary intervention.  Neurology recommended at least 4 weeks before considering any intervention.  Patient with suicidal thoughts and ideation during hospitalization.  Psychiatric consulted.  Patient found to have a general disorder with depressed mood.  Worsened left-sided weakness on 1/9/2024.  Repeat head CT showed evolving stroke.  Patient with dense left hemiplegia. Therapy reporting occ. Increased spasticity.     Transitional Care Course: Today patient sitting up in bed. Family present at bedside. Pt with recent CVA with left sided hemiparesis. Pt recently started on tizanidine for spasticity from CVA. Pt is making gains in therapy.Today he is moving his LUE. He has slight muscle movement in LLE. He is receiving diathermy. Depression with anxiety. Pt continues on Lexapro. He continues with occ periods of agitation, including throwing items. He continues on Seroquel. Pt being followed by [facility]. Therapy asking for eval for Orthotics for LLE.       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  -Brain MRI showed right PCA stroke.  -Video EEG with no seizure.  -Tylenol 650 mg TID.   -Continue gabapentin 200 mg nightly.   -ST following.  -Continue tizanidine 2 mg daily.    -increased movement in stroke affected side.   -Ok for Orthotics to eval and treat.     Agitation  -stroke induced.   -Continue Seroquel to 12.5 mg BID.     Depression   -Continue Lexapro 10 mg daily.   -pt with suicidal ideation in hospital. No recent suicidal thoughts.   -ACP following.     DANIA on CKD, stage 3.   -Baseline creatinine 1.20-1.7.  -Additional 240 ml with each med pass.     Coronary artery disease of  native artery of native heart with stable angina pectoris (H24)  -Status post coronary angiogram on 1/4/2024 revealing multivessel coronary artery disease.  -CV surgery following.  Initially CABG was recommended now cardiology is planning possible PCI.   -Hold off for 4 weeks per neurology recommendations  -Continue aspirin and statin.  -Echocardiogram showed ejection fracture of 59%.  -Continue Isosorbide.     Active Ambulatory Problems     Diagnosis Date Noted    Abnormal stress test 01/04/2024    Acute ischemic right posterior cerebral artery (PCA) stroke (H) 01/04/2024    Cerebrovascular accident (CVA) due to occlusion of left posterior cerebral artery (H) 07/04/2022    Type 2 diabetes mellitus (H) 02/16/2017    Tinnitus, bilateral 04/04/2022    Stage 3b chronic kidney disease (H) 03/13/2021    Onychomycosis of right great toe 04/05/2023    NAFLD (nonalcoholic fatty liver disease) 10/09/2019    Mixed dyslipidemia 05/05/2011    Elevated lipoprotein(a) 04/06/2022    Coronary artery disease of native artery of native heart with stable angina pectoris (H24) 01/04/2024    Visceral obesity 11/13/2020    Periodontitis 12/06/2023     Resolved Ambulatory Problems     Diagnosis Date Noted    No Resolved Ambulatory Problems     No Additional Past Medical History     Allergies   Allergen Reactions    Latex Hives and Rash    Anesthetics, Lorna Rash       All Meds and Allergies reviewed in the record at the facility and is the most up-to-date.    Current Outpatient Medications   Medication Sig    acetaminophen (TYLENOL) 325 MG tablet Take 650 mg by mouth 3 times daily    aspirin 81 MG EC tablet Take 81 mg by mouth daily    atorvastatin (LIPITOR) 80 MG tablet Take 80 mg by mouth at bedtime    clopidogrel (PLAVIX) 75 MG tablet Take 75 mg by mouth daily    escitalopram (LEXAPRO) 5 MG tablet Take 5 mg by mouth daily Start 5 mg PO daily X 1 week then increase to 10 mg daily.    gabapentin (NEURONTIN) 100 MG capsule Take 200 mg by  "mouth at bedtime    isosorbide dinitrate (ISORDIL) 5 MG tablet Take 5 mg by mouth 3 times daily    metoprolol tartrate (LOPRESSOR) 25 MG tablet Take 25 mg by mouth 2 times daily    nitroGLYcerin (NITROSTAT) 0.4 MG sublingual tablet Place 0.4 mg under the tongue every 5 minutes as needed for chest pain For chest pain place 1 tablet under the tongue every 5 minutes for 3 doses. If symptoms persist 5 minutes after 1st dose call 911.    QUEtiapine (SEROQUEL) 25 MG tablet Take 12.5 mg by mouth 2 times daily    Suvorexant (BELSOMRA) 10 MG tablet Take 10 mg by mouth nightly as needed for sleep    tiZANidine (ZANAFLEX) 2 MG capsule Take 2 mg by mouth daily     No current facility-administered medications for this visit.       REVIEW OF SYSTEMS:   10 point review of systems reviewed and pertinent positives in the HPI.     PHYSICAL EXAMINATION:  Physical Exam     Vital signs: BP (!) 160/78   Pulse 72   Temp 98  F (36.7  C)   Resp 18   Ht 1.778 m (5' 10\")   Wt 75.8 kg (167 lb 3.2 oz)   SpO2 99%   BMI 23.99 kg/m    General: Alert, awake, oriented x2, lying in bed,  follows simple commands at times   HEENT:Pink conjunctiva, dry oral mucosa, left side facial droop. Speech clear.   NECK: Supple  BACK: No kyphosis of the thoracic spine  EXTREMITIES: Left side hemiparesis, no pedal edema. Increased movement in LLL and LUE.   SKIN: Warm and dry  NEUROLOGIC: See above, pulses palpable.  PSYCHIATRIC: pleasant affect.       Labs:  All labs reviewed in the nursing home record and [facility]     This note has been dictated using voice recognition software. Any grammatical or context distortions are unintentional and inherent to the software    Electronically signed by: Essence Conteh CNP   "

## 2024-03-06 ENCOUNTER — LAB REQUISITION (OUTPATIENT)
Dept: LAB | Facility: CLINIC | Age: 74
End: 2024-03-06
Payer: COMMERCIAL

## 2024-03-06 DIAGNOSIS — I25.118 ATHEROSCLEROTIC HEART DISEASE OF NATIVE CORONARY ARTERY WITH OTHER FORMS OF ANGINA PECTORIS (H): ICD-10-CM

## 2024-03-07 LAB
ANION GAP SERPL CALCULATED.3IONS-SCNC: 8 MMOL/L (ref 7–15)
BUN SERPL-MCNC: 13.3 MG/DL (ref 8–23)
CALCIUM SERPL-MCNC: 8.7 MG/DL (ref 8.8–10.2)
CHLORIDE SERPL-SCNC: 107 MMOL/L (ref 98–107)
CREAT SERPL-MCNC: 1.31 MG/DL (ref 0.67–1.17)
DEPRECATED HCO3 PLAS-SCNC: 27 MMOL/L (ref 22–29)
EGFRCR SERPLBLD CKD-EPI 2021: 57 ML/MIN/1.73M2
ERYTHROCYTE [DISTWIDTH] IN BLOOD BY AUTOMATED COUNT: 13.7 % (ref 10–15)
GLUCOSE SERPL-MCNC: 97 MG/DL (ref 70–99)
HCT VFR BLD AUTO: 37.1 % (ref 40–53)
HGB BLD-MCNC: 12.1 G/DL (ref 13.3–17.7)
MCH RBC QN AUTO: 31.8 PG (ref 26.5–33)
MCHC RBC AUTO-ENTMCNC: 32.6 G/DL (ref 31.5–36.5)
MCV RBC AUTO: 98 FL (ref 78–100)
PLATELET # BLD AUTO: 294 10E3/UL (ref 150–450)
POTASSIUM SERPL-SCNC: 4.6 MMOL/L (ref 3.4–5.3)
RBC # BLD AUTO: 3.8 10E6/UL (ref 4.4–5.9)
SODIUM SERPL-SCNC: 142 MMOL/L (ref 135–145)
WBC # BLD AUTO: 8.2 10E3/UL (ref 4–11)

## 2024-03-07 PROCEDURE — 80048 BASIC METABOLIC PNL TOTAL CA: CPT | Mod: ORL | Performed by: NURSE PRACTITIONER

## 2024-03-07 PROCEDURE — 36415 COLL VENOUS BLD VENIPUNCTURE: CPT | Mod: ORL | Performed by: NURSE PRACTITIONER

## 2024-03-07 PROCEDURE — 85027 COMPLETE CBC AUTOMATED: CPT | Mod: ORL | Performed by: NURSE PRACTITIONER

## 2024-03-07 PROCEDURE — P9603 ONE-WAY ALLOW PRORATED MILES: HCPCS | Mod: ORL | Performed by: NURSE PRACTITIONER

## 2024-03-10 ENCOUNTER — TELEPHONE (OUTPATIENT)
Dept: GERIATRICS | Facility: CLINIC | Age: 74
End: 2024-03-10
Payer: COMMERCIAL

## 2024-03-10 NOTE — PROGRESS NOTES
Staff paged this morning with patient reports of chest pain low blood pressure and blurred vision.     Okay given to send to ED for further eval and treat    Dr. Stephanie Peoples, APRN, DNP

## 2024-03-13 ENCOUNTER — DOCUMENTATION ONLY (OUTPATIENT)
Dept: GERIATRICS | Facility: CLINIC | Age: 74
End: 2024-03-13
Payer: COMMERCIAL

## 2024-03-13 PROBLEM — Z86.73 HISTORY OF CVA (CEREBROVASCULAR ACCIDENT): Status: ACTIVE | Noted: 2024-03-10

## 2024-03-13 PROBLEM — I95.9 HYPOTENSION: Status: ACTIVE | Noted: 2024-03-10

## 2024-03-14 ENCOUNTER — TRANSITIONAL CARE UNIT VISIT (OUTPATIENT)
Dept: GERIATRICS | Facility: CLINIC | Age: 74
End: 2024-03-14
Payer: COMMERCIAL

## 2024-03-14 VITALS
RESPIRATION RATE: 20 BRPM | DIASTOLIC BLOOD PRESSURE: 64 MMHG | HEIGHT: 70 IN | SYSTOLIC BLOOD PRESSURE: 113 MMHG | OXYGEN SATURATION: 99 % | WEIGHT: 162 LBS | TEMPERATURE: 97.9 F | HEART RATE: 81 BPM | BODY MASS INDEX: 23.19 KG/M2

## 2024-03-14 DIAGNOSIS — R13.10 DYSPHAGIA, UNSPECIFIED TYPE: ICD-10-CM

## 2024-03-14 DIAGNOSIS — I25.118 CORONARY ARTERY DISEASE OF NATIVE ARTERY OF NATIVE HEART WITH STABLE ANGINA PECTORIS (H): Primary | ICD-10-CM

## 2024-03-14 DIAGNOSIS — I63.531 ACUTE ISCHEMIC RIGHT POSTERIOR CEREBRAL ARTERY (PCA) STROKE (H): ICD-10-CM

## 2024-03-14 DIAGNOSIS — N18.9 ACUTE KIDNEY INJURY SUPERIMPOSED ON CKD (H): ICD-10-CM

## 2024-03-14 DIAGNOSIS — F34.1 PERSISTENT DEPRESSIVE DISORDER WITH ANXIOUS DISTRESS, CURRENTLY SEVERE: ICD-10-CM

## 2024-03-14 DIAGNOSIS — E11.00 TYPE 2 DIABETES MELLITUS WITH HYPEROSMOLARITY WITHOUT COMA, UNSPECIFIED WHETHER LONG TERM INSULIN USE (H): ICD-10-CM

## 2024-03-14 DIAGNOSIS — N17.9 ACUTE KIDNEY INJURY SUPERIMPOSED ON CKD (H): ICD-10-CM

## 2024-03-14 PROCEDURE — 99309 SBSQ NF CARE MODERATE MDM 30: CPT | Performed by: NURSE PRACTITIONER

## 2024-03-14 RX ORDER — AMOXICILLIN 250 MG
1 CAPSULE ORAL 2 TIMES DAILY
COMMUNITY
Start: 2024-03-14

## 2024-03-14 NOTE — PROGRESS NOTES
"[facility] GERIATRIC SERVICES    Code Status:  DNR   Visit Type:   Chief Complaint   Patient presents with    TCU Hospital F/U     [facility] 3/10/2024 - 3/13/2024     Facility:  [facility] ([facility]) [[id number]]         HPI: Shaji Markham is a 73 year old male who I am seeing today for re admit to the TCU. Pt recently hospitalized with hypotension. Past medical history includes CAD with recent CVA, depression with anxiety, DM2, CKD stage 3b. Pt in PT when he became dizzy, weak with blurred vision. BP low in the sbp 70s. Pt c/o chest pain and was given nitroglycerin. Pt given fluids and bp improved. Metoprolol discontinued during hospitalization. Pt continues on isosorbide. DM2 currently not on meds. CAD on clopidogrel, ASA and atorvastatin. Repeat CT showed subacute/chronic appearing infarction right parasagittal occipital lobe within the right PCA vascular territory which appeared to have undergone some temporal evolution from prior head CT. He was seen by ST during hospitalization and downgraded to pureed diet.     Transitional Care Course: Today pt lying in bed. BP improved. Pt denies any dizziness. No SOB or CP. Pt continues with depressive symptoms. He feels \"he has had a set back.\" He did not return on his Lexapro. He continues on pureed diet. He feels he does not have any swallowing difficulty. Pt reports voiding and having regular bowel movements. Pt with recent CVA. He continues with left sided hemiplegia.       Assessment/Plan:     Coronary artery disease of native artery of native heart with stable angina pectoris (H24)  Acute ischemic right posterior cerebral artery (PCA) stroke (H)  Spasticity  -Continue ASA, clopidogrel and statin.   -left side hemiparesis.   -continue isosorbide 5 mg BID.   -continues tizanidine 2 mg every day.     Persistent depressive disorder with anxious distress, currently severe  Agitation   -resume Lexapro 5 mg every day.   -continue Seroquel.     Type 2 diabetes mellitus " with hyperosmolarity without coma, unspecified whether long term insulin use (H)  -not on medications.   -diet controlled.     HTN  With recent hypotension   -metoprolol discontinued during hospitalization.   -continue isosorbide.      Acute kidney injury superimposed on CKD  (H24)  -follow up BMP.     Dysphagia, unspecified type  -continue pureed diet and thin liquids.   -ST to eval and treat.       -Ok for PT, OT to eval and treat. Follow up CBC and BMP.         Active Ambulatory Problems     Diagnosis Date Noted    Abnormal stress test 01/04/2024    Acute ischemic right posterior cerebral artery (PCA) stroke (H) 01/04/2024    Cerebrovascular accident (CVA) due to occlusion of left posterior cerebral artery (H) 07/04/2022    Type 2 diabetes mellitus (H) 02/16/2017    Tinnitus, bilateral 04/04/2022    Stage 3b chronic kidney disease (H) 03/13/2021    Onychomycosis of right great toe 04/05/2023    NAFLD (nonalcoholic fatty liver disease) 10/09/2019    Mixed dyslipidemia 05/05/2011    Elevated lipoprotein(a) 04/06/2022    Coronary artery disease of native artery of native heart with stable angina pectoris (H24) 01/04/2024    Visceral obesity 11/13/2020    Periodontitis 12/06/2023    History of CVA (cerebrovascular accident) 03/10/2024    Hypotension 03/10/2024     Resolved Ambulatory Problems     Diagnosis Date Noted    No Resolved Ambulatory Problems     No Additional Past Medical History     Allergies   Allergen Reactions    Latex Hives and Rash    Anesthetics, Lorna Rash       All Meds and Allergies reviewed in the record at the facility and is the most up-to-date.    Post Discharge Medication Reconciliation Status: discharge medications reconciled and changed, per note/orders  Current Outpatient Medications   Medication Sig    acetaminophen (TYLENOL) 325 MG tablet Take 650 mg by mouth 3 times daily    aspirin 81 MG EC tablet Take 81 mg by mouth daily    atorvastatin (LIPITOR) 80 MG tablet Take 80 mg by mouth at  "bedtime    clopidogrel (PLAVIX) 75 MG tablet Take 75 mg by mouth daily    gabapentin (NEURONTIN) 100 MG capsule Take 100 mg by mouth at bedtime    isosorbide dinitrate (ISORDIL) 5 MG tablet Take 1 tablet (5 mg) by mouth 2 times daily    nitroGLYcerin (NITROSTAT) 0.4 MG sublingual tablet Place 0.4 mg under the tongue every 5 minutes as needed for chest pain For chest pain place 1 tablet under the tongue every 5 minutes for 3 doses. If symptoms persist 5 minutes after 1st dose call 911.    QUEtiapine (SEROQUEL) 25 MG tablet Take 12.5-25 mg by mouth 2 times daily 12.5 mg BID scheduled AND 25 mg once daily PRN    senna-docusate (SENOKOT-S/PERICOLACE) 8.6-50 MG tablet Take 1 tablet by mouth 2 times daily    tiZANidine (ZANAFLEX) 2 MG capsule Take 2 mg by mouth daily    escitalopram (LEXAPRO) 5 MG tablet Take 5 mg by mouth daily Start 5 mg PO daily X 1 week then increase to 10 mg daily. (Patient not taking: Reported on 3/14/2024)    metoprolol tartrate (LOPRESSOR) 25 MG tablet Take 25 mg by mouth 2 times daily (Patient not taking: Reported on 3/14/2024)    Suvorexant (BELSOMRA) 10 MG tablet Take 10 mg by mouth nightly as needed for sleep (Patient not taking: Reported on 3/14/2024)     No current facility-administered medications for this visit.       REVIEW OF SYSTEMS:   10 point review of systems reviewed and pertinent positives in the HPI.     PHYSICAL EXAMINATION:  Physical Exam     Vital signs: /64   Pulse 81   Temp 97.9  F (36.6  C)   Resp 20   Ht 1.778 m (5' 10\")   Wt 73.5 kg (162 lb)   SpO2 99%   BMI 23.24 kg/m    General: Awake, Alert, oriented x3, lying in bed, appropriately, follows simple commands, conversant  HEENT:PERRLA, Pink conjunctiva, moist oral mucosa. Facial symmetry noted. Tongue is midline.   NECK: Supple  CVS:  S1  S2, without murmur or gallop.   LUNG: Clear to auscultation, No wheezes, rales or rhonci.  BACK: No kyphosis of the thoracic spine  ABDOMEN: Soft, nontender to palpation, " with positive bowel sounds  EXTREMITIES: Left sided hemiparesis, no pedal edema, no calf tenderness  SKIN: Warm and dry  NEUROLOGIC: Intact, pulses palpable  PSYCHIATRIC: Flat affect. Depressive symptomology.       Labs:  All labs reviewed in the nursing home record and Epic   @  Lab Results   Component Value Date    WBC 8.2 03/07/2024     Lab Results   Component Value Date    RBC 3.80 03/07/2024     Lab Results   Component Value Date    HGB 12.1 03/07/2024     Lab Results   Component Value Date    HCT 37.1 03/07/2024     Lab Results   Component Value Date    MCV 98 03/07/2024     Lab Results   Component Value Date    MCH 31.8 03/07/2024     Lab Results   Component Value Date    MCHC 32.6 03/07/2024     Lab Results   Component Value Date    RDW 13.7 03/07/2024     Lab Results   Component Value Date     03/07/2024        @Last Comprehensive Metabolic Panel:  Sodium   Date Value Ref Range Status   03/07/2024 142 135 - 145 mmol/L Final     Comment:     Reference intervals for this test were updated on 09/26/2023 to more accurately reflect our healthy population. There may be differences in the flagging of prior results with similar values performed with this method. Interpretation of those prior results can be made in the context of the updated reference intervals.      Potassium   Date Value Ref Range Status   03/07/2024 4.6 3.4 - 5.3 mmol/L Final     Chloride   Date Value Ref Range Status   03/07/2024 107 98 - 107 mmol/L Final     Carbon Dioxide (CO2)   Date Value Ref Range Status   03/07/2024 27 22 - 29 mmol/L Final     Anion Gap   Date Value Ref Range Status   03/07/2024 8 7 - 15 mmol/L Final     Glucose   Date Value Ref Range Status   03/07/2024 97 70 - 99 mg/dL Final     Urea Nitrogen   Date Value Ref Range Status   03/07/2024 13.3 8.0 - 23.0 mg/dL Final     Creatinine   Date Value Ref Range Status   03/07/2024 1.31 (H) 0.67 - 1.17 mg/dL Final     GFR Estimate   Date Value Ref Range Status   03/07/2024 57  (L) >60 mL/min/1.73m2 Final     Calcium   Date Value Ref Range Status   03/07/2024 8.7 (L) 8.8 - 10.2 mg/dL Final     > 35 minutes spent preparing for this visit, reviewing labs, imaging, medications as well as face to face time spent with pt and collaboration with nursing staff.     This note has been dictated using voice recognition software. Any grammatical or context distortions are unintentional and inherent to the software    Electronically signed by: Essence Conteh CNP

## 2024-03-14 NOTE — LETTER
"    3/14/2024        RE: Shaji Markham  [address]   [facility] [address]        [facility]    Code Status:  DNR   Visit Type:   Chief Complaint   Patient presents with     TCU Hospital F/U     [employer] 3/10/2024 - 3/13/2024     Facility:  [facility] ([facility]) [[id number]]         HPI: Shaji Markham is a 73 year old male who I am seeing today for re admit to the TCU. Pt recently hospitalized with hypotension. Past medical history includes CAD with recent CVA, depression with anxiety, DM2, CKD stage 3b. Pt in PT when he became dizzy, weak with blurred vision. BP low in the sbp 70s. Pt c/o chest pain and was given nitroglycerin. Pt given fluids and bp improved. Metoprolol discontinued during hospitalization. Pt continues on isosorbide. DM2 currently not on meds. CAD on clopidogrel, ASA and atorvastatin. Repeat CT showed subacute/chronic appearing infarction right parasagittal occipital lobe within the right PCA vascular territory which appeared to have undergone some temporal evolution from prior head CT. He was seen by ST during hospitalization and downgraded to pureed diet.     Transitional Care Course: Today pt lying in bed. BP improved. Pt denies any dizziness. No SOB or CP. Pt continues with depressive symptoms. He feels \"he has had a set back.\" He did not return on his Lexapro. He continues on pureed diet. He feels he does not have any swallowing difficulty. Pt reports voiding and having regular bowel movements. Pt with recent CVA. He continues with left sided hemiplegia.       Assessment/Plan:     Coronary artery disease of native artery of native heart with stable angina pectoris (H24)  Acute ischemic right posterior cerebral artery (PCA) stroke (H)  Spasticity  -Continue ASA, clopidogrel and statin.   -left side hemiparesis.   -continue isosorbide 5 mg BID.   -continues tizanidine 2 mg every day.     Persistent depressive disorder with anxious distress, currently severe  Agitation "   -resume Lexapro 5 mg every day.   -continue Seroquel.     Type 2 diabetes mellitus with hyperosmolarity without coma, unspecified whether long term insulin use (H)  -not on medications.   -diet controlled.     HTN  With recent hypotension   -metoprolol discontinued during hospitalization.   -continue isosorbide.      Acute kidney injury superimposed on CKD  (H24)  -follow up BMP.     Dysphagia, unspecified type  -continue pureed diet and thin liquids.   -ST to eval and treat.       -Ok for PT, OT to eval and treat. Follow up CBC and BMP.         Active Ambulatory Problems     Diagnosis Date Noted     Abnormal stress test 01/04/2024     Acute ischemic right posterior cerebral artery (PCA) stroke (H) 01/04/2024     Cerebrovascular accident (CVA) due to occlusion of left posterior cerebral artery (H) 07/04/2022     Type 2 diabetes mellitus (H) 02/16/2017     Tinnitus, bilateral 04/04/2022     Stage 3b chronic kidney disease (H) 03/13/2021     Onychomycosis of right great toe 04/05/2023     NAFLD (nonalcoholic fatty liver disease) 10/09/2019     Mixed dyslipidemia 05/05/2011     Elevated lipoprotein(a) 04/06/2022     Coronary artery disease of native artery of native heart with stable angina pectoris (H24) 01/04/2024     Visceral obesity 11/13/2020     Periodontitis 12/06/2023     History of CVA (cerebrovascular accident) 03/10/2024     Hypotension 03/10/2024     Resolved Ambulatory Problems     Diagnosis Date Noted     No Resolved Ambulatory Problems     No Additional Past Medical History     Allergies   Allergen Reactions     Latex Hives and Rash     Anesthetics, Lorna Rash       All Meds and Allergies reviewed in the record at the facility and is the most up-to-date.    Post Discharge Medication Reconciliation Status: discharge medications reconciled and changed, per note/orders  Current Outpatient Medications   Medication Sig     acetaminophen (TYLENOL) 325 MG tablet Take 650 mg by mouth 3 times daily     aspirin  "81 MG EC tablet Take 81 mg by mouth daily     atorvastatin (LIPITOR) 80 MG tablet Take 80 mg by mouth at bedtime     clopidogrel (PLAVIX) 75 MG tablet Take 75 mg by mouth daily     gabapentin (NEURONTIN) 100 MG capsule Take 100 mg by mouth at bedtime     isosorbide dinitrate (ISORDIL) 5 MG tablet Take 1 tablet (5 mg) by mouth 2 times daily     nitroGLYcerin (NITROSTAT) 0.4 MG sublingual tablet Place 0.4 mg under the tongue every 5 minutes as needed for chest pain For chest pain place 1 tablet under the tongue every 5 minutes for 3 doses. If symptoms persist 5 minutes after 1st dose call 911.     QUEtiapine (SEROQUEL) 25 MG tablet Take 12.5-25 mg by mouth 2 times daily 12.5 mg BID scheduled AND 25 mg once daily PRN     senna-docusate (SENOKOT-S/PERICOLACE) 8.6-50 MG tablet Take 1 tablet by mouth 2 times daily     tiZANidine (ZANAFLEX) 2 MG capsule Take 2 mg by mouth daily     escitalopram (LEXAPRO) 5 MG tablet Take 5 mg by mouth daily Start 5 mg PO daily X 1 week then increase to 10 mg daily. (Patient not taking: Reported on 3/14/2024)     metoprolol tartrate (LOPRESSOR) 25 MG tablet Take 25 mg by mouth 2 times daily (Patient not taking: Reported on 3/14/2024)     Suvorexant (BELSOMRA) 10 MG tablet Take 10 mg by mouth nightly as needed for sleep (Patient not taking: Reported on 3/14/2024)     No current facility-administered medications for this visit.       REVIEW OF SYSTEMS:   10 point review of systems reviewed and pertinent positives in the HPI.     PHYSICAL EXAMINATION:  Physical Exam     Vital signs: /64   Pulse 81   Temp 97.9  F (36.6  C)   Resp 20   Ht 1.778 m (5' 10\")   Wt 73.5 kg (162 lb)   SpO2 99%   BMI 23.24 kg/m    General: Awake, Alert, oriented x3, lying in bed, appropriately, follows simple commands, conversant  HEENT:PERRLA, Pink conjunctiva, moist oral mucosa. Facial symmetry noted. Tongue is midline.   NECK: Supple  CVS:  S1  S2, without murmur or gallop.   LUNG: Clear to " auscultation, No wheezes, rales or rhonci.  BACK: No kyphosis of the thoracic spine  ABDOMEN: Soft, nontender to palpation, with positive bowel sounds  EXTREMITIES: Left sided hemiparesis, no pedal edema, no calf tenderness  SKIN: Warm and dry  NEUROLOGIC: Intact, pulses palpable  PSYCHIATRIC: Flat affect. Depressive symptomology.       Labs:  All labs reviewed in the nursing home record and Epic   @  Lab Results   Component Value Date    WBC 8.2 03/07/2024     Lab Results   Component Value Date    RBC 3.80 03/07/2024     Lab Results   Component Value Date    HGB 12.1 03/07/2024     Lab Results   Component Value Date    HCT 37.1 03/07/2024     Lab Results   Component Value Date    MCV 98 03/07/2024     Lab Results   Component Value Date    MCH 31.8 03/07/2024     Lab Results   Component Value Date    MCHC 32.6 03/07/2024     Lab Results   Component Value Date    RDW 13.7 03/07/2024     Lab Results   Component Value Date     03/07/2024        @Last Comprehensive Metabolic Panel:  Sodium   Date Value Ref Range Status   03/07/2024 142 135 - 145 mmol/L Final     Comment:     Reference intervals for this test were updated on 09/26/2023 to more accurately reflect our healthy population. There may be differences in the flagging of prior results with similar values performed with this method. Interpretation of those prior results can be made in the context of the updated reference intervals.      Potassium   Date Value Ref Range Status   03/07/2024 4.6 3.4 - 5.3 mmol/L Final     Chloride   Date Value Ref Range Status   03/07/2024 107 98 - 107 mmol/L Final     Carbon Dioxide (CO2)   Date Value Ref Range Status   03/07/2024 27 22 - 29 mmol/L Final     Anion Gap   Date Value Ref Range Status   03/07/2024 8 7 - 15 mmol/L Final     Glucose   Date Value Ref Range Status   03/07/2024 97 70 - 99 mg/dL Final     Urea Nitrogen   Date Value Ref Range Status   03/07/2024 13.3 8.0 - 23.0 mg/dL Final     Creatinine   Date Value  Ref Range Status   03/07/2024 1.31 (H) 0.67 - 1.17 mg/dL Final     GFR Estimate   Date Value Ref Range Status   03/07/2024 57 (L) >60 mL/min/1.73m2 Final     Calcium   Date Value Ref Range Status   03/07/2024 8.7 (L) 8.8 - 10.2 mg/dL Final     > 35 minutes spent preparing for this visit, reviewing labs, imaging, medications as well as face to face time spent with pt and collaboration with nursing staff.     This note has been dictated using voice recognition software. Any grammatical or context distortions are unintentional and inherent to the software    Electronically signed by: Essence Conteh CNP       Sincerely,        Essence Conteh NP

## 2024-03-15 ENCOUNTER — LAB REQUISITION (OUTPATIENT)
Dept: LAB | Facility: CLINIC | Age: 74
End: 2024-03-15
Payer: COMMERCIAL

## 2024-03-15 DIAGNOSIS — I95.9 HYPOTENSION, UNSPECIFIED: ICD-10-CM

## 2024-03-18 LAB
ANION GAP SERPL CALCULATED.3IONS-SCNC: 11 MMOL/L (ref 7–15)
BUN SERPL-MCNC: 18.6 MG/DL (ref 8–23)
CALCIUM SERPL-MCNC: 9.1 MG/DL (ref 8.8–10.2)
CHLORIDE SERPL-SCNC: 106 MMOL/L (ref 98–107)
CREAT SERPL-MCNC: 1.32 MG/DL (ref 0.67–1.17)
DEPRECATED HCO3 PLAS-SCNC: 23 MMOL/L (ref 22–29)
EGFRCR SERPLBLD CKD-EPI 2021: 57 ML/MIN/1.73M2
ERYTHROCYTE [DISTWIDTH] IN BLOOD BY AUTOMATED COUNT: 13.9 % (ref 10–15)
GLUCOSE SERPL-MCNC: 93 MG/DL (ref 70–99)
HCT VFR BLD AUTO: 33.6 % (ref 40–53)
HGB BLD-MCNC: 11.3 G/DL (ref 13.3–17.7)
MCH RBC QN AUTO: 32 PG (ref 26.5–33)
MCHC RBC AUTO-ENTMCNC: 33.6 G/DL (ref 31.5–36.5)
MCV RBC AUTO: 95 FL (ref 78–100)
PLATELET # BLD AUTO: 262 10E3/UL (ref 150–450)
POTASSIUM SERPL-SCNC: 4.2 MMOL/L (ref 3.4–5.3)
RBC # BLD AUTO: 3.53 10E6/UL (ref 4.4–5.9)
SODIUM SERPL-SCNC: 140 MMOL/L (ref 135–145)
WBC # BLD AUTO: 6.8 10E3/UL (ref 4–11)

## 2024-03-18 PROCEDURE — 80048 BASIC METABOLIC PNL TOTAL CA: CPT | Mod: ORL | Performed by: FAMILY MEDICINE

## 2024-03-18 PROCEDURE — 85027 COMPLETE CBC AUTOMATED: CPT | Mod: ORL | Performed by: FAMILY MEDICINE

## 2024-03-18 PROCEDURE — P9604 ONE-WAY ALLOW PRORATED TRIP: HCPCS | Mod: ORL | Performed by: FAMILY MEDICINE

## 2024-03-18 PROCEDURE — 36415 COLL VENOUS BLD VENIPUNCTURE: CPT | Mod: ORL | Performed by: FAMILY MEDICINE

## 2024-03-19 ENCOUNTER — TRANSITIONAL CARE UNIT VISIT (OUTPATIENT)
Dept: GERIATRICS | Facility: CLINIC | Age: 74
End: 2024-03-19
Payer: COMMERCIAL

## 2024-03-19 VITALS
HEART RATE: 57 BPM | SYSTOLIC BLOOD PRESSURE: 103 MMHG | RESPIRATION RATE: 18 BRPM | TEMPERATURE: 98 F | WEIGHT: 159.8 LBS | BODY MASS INDEX: 22.93 KG/M2 | OXYGEN SATURATION: 95 % | DIASTOLIC BLOOD PRESSURE: 51 MMHG

## 2024-03-19 DIAGNOSIS — I25.118 CORONARY ARTERY DISEASE OF NATIVE ARTERY OF NATIVE HEART WITH STABLE ANGINA PECTORIS (H): ICD-10-CM

## 2024-03-19 DIAGNOSIS — I63.531 ACUTE ISCHEMIC RIGHT POSTERIOR CEREBRAL ARTERY (PCA) STROKE (H): Primary | ICD-10-CM

## 2024-03-19 DIAGNOSIS — I95.1 ORTHOSTATIC HYPOTENSION: ICD-10-CM

## 2024-03-19 DIAGNOSIS — F34.1 PERSISTENT DEPRESSIVE DISORDER WITH ANXIOUS DISTRESS, CURRENTLY SEVERE: ICD-10-CM

## 2024-03-19 DIAGNOSIS — E11.00 TYPE 2 DIABETES MELLITUS WITH HYPEROSMOLARITY WITHOUT COMA, UNSPECIFIED WHETHER LONG TERM INSULIN USE (H): ICD-10-CM

## 2024-03-19 DIAGNOSIS — R13.10 DYSPHAGIA, UNSPECIFIED TYPE: ICD-10-CM

## 2024-03-19 PROCEDURE — 99309 SBSQ NF CARE MODERATE MDM 30: CPT | Performed by: NURSE PRACTITIONER

## 2024-03-19 NOTE — LETTER
3/19/2024        RE: Shaji Markham  [address]   [facility] [id number]        [facility]    Code Status:  DNR   Visit Type:   Chief Complaint   Patient presents with     <9>TCU Follow Up     Facility:  [facility]) [[id number]]         HPI: Shaji Markham is a 73 year old male who I am seeing today for follow up on  the TCU. Pt recently hospitalized with hypotension. Past medical history includes CAD with recent CVA, depression with anxiety, DM2, CKD stage 3b. Pt in PT when he became dizzy, weak with blurred vision. BP low in the sbp 70s. Pt c/o chest pain and was given nitroglycerin. Pt given fluids and bp improved. Metoprolol discontinued during hospitalization. Pt continues on isosorbide. DM2 currently not on meds. CAD on clopidogrel, ASA and atorvastatin. Repeat CT showed subacute/chronic appearing infarction right parasagittal occipital lobe within the right PCA vascular territory which appeared to have undergone some temporal evolution from prior head CT. He was seen by ST during hospitalization and downgraded to pureed diet.     Transitional Care Course: Today pt lying in bed. Family present on exam. CVA with left side weakness. He is gaining strength. He is able to overcome gravity in his LLE. He has slight movement in the left arm. Spasticity noted. He denies any pain. He is also on gabapentin. He continue with depressive symptoms. His Lexapro was resumed. Dysphagia. FEES test repeated at facility. Pt upgraded to thin liquids. Nursing staff report continued low bp. Pt is symptomatic. Metoprolol discontinued in hospital. He continues on isosorbide 5 mg. He has 95% carotid stenosis. Pt slide out of WC 2 days prior. No LOC, pt did not hit his head. He has bruising to arm and leg.       Assessment/Plan:     Coronary artery disease of native artery of native heart with stable angina pectoris (H24)  Acute ischemic right posterior cerebral artery (PCA) stroke  (H)  Spasticity  -Continue ASA, clopidogrel and statin.   -left side hemiparesis.   -continue isosorbide 5 mg BID. (Hold for SBP < 95.)   -continues tizanidine 2 mg every day.     Persistent depressive disorder with anxious distress, currently severe  Agitation   -resume Lexapro 5 mg every day.   -continue Seroquel. (Attempt to wean off).     Type 2 diabetes mellitus with hyperosmolarity without coma, unspecified whether long term insulin use (H)  -not on medications.   -diet controlled.     HTN  With recent hypotension   -metoprolol discontinued during hospitalization.   -continue isosorbide hold for SBP < 95.   -GAEL hose Q day for bp support.     Acute kidney injury superimposed on CKD  (H24)  -follow up BMP unremarkable.     Dysphagia, unspecified type  -FEES test recently. Liquids upgraded to thin.   -Continue pureed diet.   -ST following.     Active Ambulatory Problems     Diagnosis Date Noted     Abnormal stress test 01/04/2024     Acute ischemic right posterior cerebral artery (PCA) stroke (H) 01/04/2024     Cerebrovascular accident (CVA) due to occlusion of left posterior cerebral artery (H) 07/04/2022     Type 2 diabetes mellitus (H) 02/16/2017     Tinnitus, bilateral 04/04/2022     Stage 3b chronic kidney disease (H) 03/13/2021     Onychomycosis of right great toe 04/05/2023     NAFLD (nonalcoholic fatty liver disease) 10/09/2019     Mixed dyslipidemia 05/05/2011     Elevated lipoprotein(a) 04/06/2022     Coronary artery disease of native artery of native heart with stable angina pectoris (H24) 01/04/2024     Visceral obesity 11/13/2020     Periodontitis 12/06/2023     History of CVA (cerebrovascular accident) 03/10/2024     Hypotension 03/10/2024     Resolved Ambulatory Problems     Diagnosis Date Noted     No Resolved Ambulatory Problems     No Additional Past Medical History     Allergies   Allergen Reactions     Latex Hives and Rash     Anesthetics, Lorna Rash       All Meds and Allergies reviewed in  the record at the facility and is the most up-to-date.    Current Outpatient Medications   Medication Sig     acetaminophen (TYLENOL) 325 MG tablet Take 650 mg by mouth 3 times daily     aspirin 81 MG EC tablet Take 81 mg by mouth daily     atorvastatin (LIPITOR) 80 MG tablet Take 80 mg by mouth at bedtime     clopidogrel (PLAVIX) 75 MG tablet Take 75 mg by mouth daily     escitalopram (LEXAPRO) 5 MG tablet Take 5 mg by mouth daily Start 5 mg PO daily X 1 week then increase to 10 mg daily.     gabapentin (NEURONTIN) 100 MG capsule Take 100 mg by mouth at bedtime     isosorbide dinitrate (ISORDIL) 5 MG tablet Take 1 tablet (5 mg) by mouth 2 times daily     QUEtiapine (SEROQUEL) 25 MG tablet Take 12.5-25 mg by mouth 2 times daily 12.5 mg BID scheduled AND 25 mg once daily PRN     senna-docusate (SENOKOT-S/PERICOLACE) 8.6-50 MG tablet Take 1 tablet by mouth 2 times daily     tiZANidine (ZANAFLEX) 2 MG capsule Take 2 mg by mouth daily     No current facility-administered medications for this visit.       REVIEW OF SYSTEMS:   10 point review of systems reviewed and pertinent positives in the HPI.     PHYSICAL EXAMINATION:  Physical Exam     Vital signs: /51   Pulse 57   Temp 98  F (36.7  C)   Resp 18   Wt 72.5 kg (159 lb 12.8 oz)   SpO2 95%   BMI 22.93 kg/m    General: Awake, Alert, oriented x3, lying in bed, appropriately, follows simple commands, conversant  HEENT:PERRLA, Pink conjunctiva, moist oral mucosa. Facial symmetry noted. Tongue is midline.   NECK: Supple  CVS:  S1  S2, without murmur or gallop.   LUNG: Clear to auscultation, No wheezes, rales or rhonci.  BACK: No kyphosis of the thoracic spine  ABDOMEN: Soft, nontender to palpation, with positive bowel sounds  EXTREMITIES: Left sided hemiparesis, no pedal edema, no calf tenderness. Able to overcome gravity LLE. Some movement in left hand.   SKIN: Warm and dry  NEUROLOGIC: Intact, pulses palpable  PSYCHIATRIC: Flat affect. Depressive symptomology.        Labs:  All labs reviewed in the nursing home record and Epic   @  Lab Results   Component Value Date    WBC 8.2 03/07/2024     Lab Results   Component Value Date    RBC 3.80 03/07/2024     Lab Results   Component Value Date    HGB 12.1 03/07/2024     Lab Results   Component Value Date    HCT 37.1 03/07/2024     Lab Results   Component Value Date    MCV 98 03/07/2024     Lab Results   Component Value Date    MCH 31.8 03/07/2024     Lab Results   Component Value Date    MCHC 32.6 03/07/2024     Lab Results   Component Value Date    RDW 13.7 03/07/2024     Lab Results   Component Value Date     03/07/2024        @Last Comprehensive Metabolic Panel:  Sodium   Date Value Ref Range Status   03/18/2024 140 135 - 145 mmol/L Final     Comment:     Reference intervals for this test were updated on 09/26/2023 to more accurately reflect our healthy population. There may be differences in the flagging of prior results with similar values performed with this method. Interpretation of those prior results can be made in the context of the updated reference intervals.      Potassium   Date Value Ref Range Status   03/18/2024 4.2 3.4 - 5.3 mmol/L Final     Chloride   Date Value Ref Range Status   03/18/2024 106 98 - 107 mmol/L Final     Carbon Dioxide (CO2)   Date Value Ref Range Status   03/18/2024 23 22 - 29 mmol/L Final     Anion Gap   Date Value Ref Range Status   03/18/2024 11 7 - 15 mmol/L Final     Glucose   Date Value Ref Range Status   03/18/2024 93 70 - 99 mg/dL Final     Urea Nitrogen   Date Value Ref Range Status   03/18/2024 18.6 8.0 - 23.0 mg/dL Final     Creatinine   Date Value Ref Range Status   03/18/2024 1.32 (H) 0.67 - 1.17 mg/dL Final     GFR Estimate   Date Value Ref Range Status   03/18/2024 57 (L) >60 mL/min/1.73m2 Final     Calcium   Date Value Ref Range Status   03/18/2024 9.1 8.8 - 10.2 mg/dL Final       This note has been dictated using voice recognition software. Any grammatical or context  distortions are unintentional and inherent to the software    Electronically signed by: Essence Conteh CNP       Sincerely,        Essence Conteh, NP

## 2024-03-20 ENCOUNTER — LAB REQUISITION (OUTPATIENT)
Dept: LAB | Facility: CLINIC | Age: 74
End: 2024-03-20
Payer: COMMERCIAL

## 2024-03-20 DIAGNOSIS — I95.9 HYPOTENSION, UNSPECIFIED: ICD-10-CM

## 2024-03-20 NOTE — PROGRESS NOTES
[facility]ATRIC SERVICES    Code Status:  DNR   Visit Type:   Chief Complaint   Patient presents with    <9>TCU Follow Up     Facility:  [facility] ([facility]) [[id number]]         HPI: Shaji Markham is a 73 year old male who I am seeing today for follow up on  the TCU. Pt recently hospitalized with hypotension. Past medical history includes CAD with recent CVA, depression with anxiety, DM2, CKD stage 3b. Pt in PT when he became dizzy, weak with blurred vision. BP low in the sbp 70s. Pt c/o chest pain and was given nitroglycerin. Pt given fluids and bp improved. Metoprolol discontinued during hospitalization. Pt continues on isosorbide. DM2 currently not on meds. CAD on clopidogrel, ASA and atorvastatin. Repeat CT showed subacute/chronic appearing infarction right parasagittal occipital lobe within the right PCA vascular territory which appeared to have undergone some temporal evolution from prior head CT. He was seen by ST during hospitalization and downgraded to pureed diet.     Transitional Care Course: Today pt lying in bed. Family present on exam. CVA with left side weakness. He is gaining strength. He is able to overcome gravity in his LLE. He has slight movement in the left arm. Spasticity noted. He denies any pain. He is also on gabapentin. He continue with depressive symptoms. His Lexapro was resumed. Dysphagia. FEES test repeated at facility. Pt upgraded to thin liquids. Nursing staff report continued low bp. Pt is symptomatic. Metoprolol discontinued in hospital. He continues on isosorbide 5 mg. He has 95% carotid stenosis. Pt slide out of WC 2 days prior. No LOC, pt did not hit his head. He has bruising to arm and leg.       Assessment/Plan:     Coronary artery disease of native artery of native heart with stable angina pectoris (H24)  Acute ischemic right posterior cerebral artery (PCA) stroke (H)  Spasticity  -Continue ASA, clopidogrel and statin.   -left side hemiparesis.   -continue  isosorbide 5 mg BID. (Hold for SBP < 95.)   -continues tizanidine 2 mg every day.     Persistent depressive disorder with anxious distress, currently severe  Agitation   -resume Lexapro 5 mg every day.   -continue Seroquel. (Attempt to wean off).     Type 2 diabetes mellitus with hyperosmolarity without coma, unspecified whether long term insulin use (H)  -not on medications.   -diet controlled.     HTN  With recent hypotension   -metoprolol discontinued during hospitalization.   -continue isosorbide hold for SBP < 95.   -GAEL hose Q day for bp support.     Acute kidney injury superimposed on CKD  (H24)  -follow up BMP unremarkable.     Dysphagia, unspecified type  -FEES test recently. Liquids upgraded to thin.   -Continue pureed diet.   -ST following.     Active Ambulatory Problems     Diagnosis Date Noted    Abnormal stress test 01/04/2024    Acute ischemic right posterior cerebral artery (PCA) stroke (H) 01/04/2024    Cerebrovascular accident (CVA) due to occlusion of left posterior cerebral artery (H) 07/04/2022    Type 2 diabetes mellitus (H) 02/16/2017    Tinnitus, bilateral 04/04/2022    Stage 3b chronic kidney disease (H) 03/13/2021    Onychomycosis of right great toe 04/05/2023    NAFLD (nonalcoholic fatty liver disease) 10/09/2019    Mixed dyslipidemia 05/05/2011    Elevated lipoprotein(a) 04/06/2022    Coronary artery disease of native artery of native heart with stable angina pectoris (H24) 01/04/2024    Visceral obesity 11/13/2020    Periodontitis 12/06/2023    History of CVA (cerebrovascular accident) 03/10/2024    Hypotension 03/10/2024     Resolved Ambulatory Problems     Diagnosis Date Noted    No Resolved Ambulatory Problems     No Additional Past Medical History     Allergies   Allergen Reactions    Latex Hives and Rash    Anesthetics, Lorna Rash       All Meds and Allergies reviewed in the record at the facility and is the most up-to-date.    Current Outpatient Medications   Medication Sig     acetaminophen (TYLENOL) 325 MG tablet Take 650 mg by mouth 3 times daily    aspirin 81 MG EC tablet Take 81 mg by mouth daily    atorvastatin (LIPITOR) 80 MG tablet Take 80 mg by mouth at bedtime    clopidogrel (PLAVIX) 75 MG tablet Take 75 mg by mouth daily    escitalopram (LEXAPRO) 5 MG tablet Take 5 mg by mouth daily Start 5 mg PO daily X 1 week then increase to 10 mg daily.    gabapentin (NEURONTIN) 100 MG capsule Take 100 mg by mouth at bedtime    isosorbide dinitrate (ISORDIL) 5 MG tablet Take 1 tablet (5 mg) by mouth 2 times daily    QUEtiapine (SEROQUEL) 25 MG tablet Take 12.5-25 mg by mouth 2 times daily 12.5 mg BID scheduled AND 25 mg once daily PRN    senna-docusate (SENOKOT-S/PERICOLACE) 8.6-50 MG tablet Take 1 tablet by mouth 2 times daily    tiZANidine (ZANAFLEX) 2 MG capsule Take 2 mg by mouth daily     No current facility-administered medications for this visit.       REVIEW OF SYSTEMS:   10 point review of systems reviewed and pertinent positives in the HPI.     PHYSICAL EXAMINATION:  Physical Exam     Vital signs: /51   Pulse 57   Temp 98  F (36.7  C)   Resp 18   Wt 72.5 kg (159 lb 12.8 oz)   SpO2 95%   BMI 22.93 kg/m    General: Awake, Alert, oriented x3, lying in bed, appropriately, follows simple commands, conversant  HEENT:PERRLA, Pink conjunctiva, moist oral mucosa. Facial symmetry noted. Tongue is midline.   NECK: Supple  CVS:  S1  S2, without murmur or gallop.   LUNG: Clear to auscultation, No wheezes, rales or rhonci.  BACK: No kyphosis of the thoracic spine  ABDOMEN: Soft, nontender to palpation, with positive bowel sounds  EXTREMITIES: Left sided hemiparesis, no pedal edema, no calf tenderness. Able to overcome gravity LLE. Some movement in left hand.   SKIN: Warm and dry  NEUROLOGIC: Intact, pulses palpable  PSYCHIATRIC: Flat affect. Depressive symptomology.       Labs:  All labs reviewed in the nursing home record and [employer]   @  Lab Results   Component Value Date    WBC 8.2  03/07/2024     Lab Results   Component Value Date    RBC 3.80 03/07/2024     Lab Results   Component Value Date    HGB 12.1 03/07/2024     Lab Results   Component Value Date    HCT 37.1 03/07/2024     Lab Results   Component Value Date    MCV 98 03/07/2024     Lab Results   Component Value Date    MCH 31.8 03/07/2024     Lab Results   Component Value Date    MCHC 32.6 03/07/2024     Lab Results   Component Value Date    RDW 13.7 03/07/2024     Lab Results   Component Value Date     03/07/2024        @Last Comprehensive Metabolic Panel:  Sodium   Date Value Ref Range Status   03/18/2024 140 135 - 145 mmol/L Final     Comment:     Reference intervals for this test were updated on 09/26/2023 to more accurately reflect our healthy population. There may be differences in the flagging of prior results with similar values performed with this method. Interpretation of those prior results can be made in the context of the updated reference intervals.      Potassium   Date Value Ref Range Status   03/18/2024 4.2 3.4 - 5.3 mmol/L Final     Chloride   Date Value Ref Range Status   03/18/2024 106 98 - 107 mmol/L Final     Carbon Dioxide (CO2)   Date Value Ref Range Status   03/18/2024 23 22 - 29 mmol/L Final     Anion Gap   Date Value Ref Range Status   03/18/2024 11 7 - 15 mmol/L Final     Glucose   Date Value Ref Range Status   03/18/2024 93 70 - 99 mg/dL Final     Urea Nitrogen   Date Value Ref Range Status   03/18/2024 18.6 8.0 - 23.0 mg/dL Final     Creatinine   Date Value Ref Range Status   03/18/2024 1.32 (H) 0.67 - 1.17 mg/dL Final     GFR Estimate   Date Value Ref Range Status   03/18/2024 57 (L) >60 mL/min/1.73m2 Final     Calcium   Date Value Ref Range Status   03/18/2024 9.1 8.8 - 10.2 mg/dL Final       This note has been dictated using voice recognition software. Any grammatical or context distortions are unintentional and inherent to the software    Electronically signed by: Essence Conteh, CNP

## 2024-03-21 ENCOUNTER — TRANSITIONAL CARE UNIT VISIT (OUTPATIENT)
Dept: GERIATRICS | Facility: CLINIC | Age: 74
End: 2024-03-21
Payer: COMMERCIAL

## 2024-03-21 VITALS
TEMPERATURE: 97.4 F | DIASTOLIC BLOOD PRESSURE: 68 MMHG | BODY MASS INDEX: 22.79 KG/M2 | OXYGEN SATURATION: 98 % | HEIGHT: 70 IN | HEART RATE: 58 BPM | RESPIRATION RATE: 20 BRPM | WEIGHT: 159.2 LBS | SYSTOLIC BLOOD PRESSURE: 142 MMHG

## 2024-03-21 DIAGNOSIS — I25.118 CORONARY ARTERY DISEASE OF NATIVE ARTERY OF NATIVE HEART WITH STABLE ANGINA PECTORIS (H): ICD-10-CM

## 2024-03-21 DIAGNOSIS — F34.1 PERSISTENT DEPRESSIVE DISORDER WITH ANXIOUS DISTRESS, CURRENTLY SEVERE: ICD-10-CM

## 2024-03-21 DIAGNOSIS — I95.1 ORTHOSTATIC HYPOTENSION: ICD-10-CM

## 2024-03-21 DIAGNOSIS — I63.531 ACUTE ISCHEMIC RIGHT POSTERIOR CEREBRAL ARTERY (PCA) STROKE (H): Primary | ICD-10-CM

## 2024-03-21 DIAGNOSIS — N18.9 ACUTE KIDNEY INJURY SUPERIMPOSED ON CKD (H): ICD-10-CM

## 2024-03-21 DIAGNOSIS — N17.9 ACUTE KIDNEY INJURY SUPERIMPOSED ON CKD (H): ICD-10-CM

## 2024-03-21 DIAGNOSIS — E11.00 TYPE 2 DIABETES MELLITUS WITH HYPEROSMOLARITY WITHOUT COMA, UNSPECIFIED WHETHER LONG TERM INSULIN USE (H): ICD-10-CM

## 2024-03-21 DIAGNOSIS — R13.10 DYSPHAGIA, UNSPECIFIED TYPE: ICD-10-CM

## 2024-03-21 LAB
ANION GAP SERPL CALCULATED.3IONS-SCNC: 12 MMOL/L (ref 7–15)
BUN SERPL-MCNC: 13.9 MG/DL (ref 8–23)
CALCIUM SERPL-MCNC: 9 MG/DL (ref 8.8–10.2)
CHLORIDE SERPL-SCNC: 105 MMOL/L (ref 98–107)
CREAT SERPL-MCNC: 1.26 MG/DL (ref 0.67–1.17)
DEPRECATED HCO3 PLAS-SCNC: 23 MMOL/L (ref 22–29)
EGFRCR SERPLBLD CKD-EPI 2021: 60 ML/MIN/1.73M2
ERYTHROCYTE [DISTWIDTH] IN BLOOD BY AUTOMATED COUNT: 13.8 % (ref 10–15)
GLUCOSE SERPL-MCNC: 103 MG/DL (ref 70–99)
HCT VFR BLD AUTO: 33.6 % (ref 40–53)
HGB BLD-MCNC: 11.1 G/DL (ref 13.3–17.7)
MCH RBC QN AUTO: 31.4 PG (ref 26.5–33)
MCHC RBC AUTO-ENTMCNC: 33 G/DL (ref 31.5–36.5)
MCV RBC AUTO: 95 FL (ref 78–100)
PLATELET # BLD AUTO: 276 10E3/UL (ref 150–450)
POTASSIUM SERPL-SCNC: 4.1 MMOL/L (ref 3.4–5.3)
RBC # BLD AUTO: 3.54 10E6/UL (ref 4.4–5.9)
SODIUM SERPL-SCNC: 140 MMOL/L (ref 135–145)
WBC # BLD AUTO: 6.8 10E3/UL (ref 4–11)

## 2024-03-21 PROCEDURE — 85027 COMPLETE CBC AUTOMATED: CPT | Mod: ORL | Performed by: NURSE PRACTITIONER

## 2024-03-21 PROCEDURE — 99309 SBSQ NF CARE MODERATE MDM 30: CPT | Performed by: NURSE PRACTITIONER

## 2024-03-21 PROCEDURE — 80048 BASIC METABOLIC PNL TOTAL CA: CPT | Mod: ORL | Performed by: NURSE PRACTITIONER

## 2024-03-21 PROCEDURE — P9604 ONE-WAY ALLOW PRORATED TRIP: HCPCS | Mod: ORL | Performed by: NURSE PRACTITIONER

## 2024-03-21 PROCEDURE — 36415 COLL VENOUS BLD VENIPUNCTURE: CPT | Mod: ORL | Performed by: NURSE PRACTITIONER

## 2024-03-21 NOTE — LETTER
3/21/2024        RE: Shaji Markham  [address]  [facility] [address]        [facility]    Code Status:  DNR   Visit Type:   Chief Complaint   Patient presents with     TCU Follow Up     Facility:  [facility]) [[id number]]         HPI: Shaji Markham is a 73 year old male who I am seeing today for follow up on  the TCU. Pt recently hospitalized with hypotension. Past medical history includes CAD with recent CVA, depression with anxiety, DM2, CKD stage 3b. Pt in PT when he became dizzy, weak with blurred vision. BP low in the sbp 70s. Pt c/o chest pain and was given nitroglycerin. Pt given fluids and bp improved. Metoprolol discontinued during hospitalization. Pt continues on isosorbide. DM2 currently not on meds. CAD on clopidogrel, ASA and atorvastatin. Repeat CT showed subacute/chronic appearing infarction right parasagittal occipital lobe within the right PCA vascular territory which appeared to have undergone some temporal evolution from prior head CT. He was seen by ST during hospitalization and downgraded to pureed diet.     Transitional Care Course: Today pt lying in bed. CVA with left side weakness. He is gaining strength in his left side greater movement in LLE. Spasticity improving with tizinadine. Neuropathic pain improved with low dose gabapentin. Mood better today. He is being seen by ACP and continues on Lexapro. Dysphagia. FEES test repeated at facility. Pt upgraded to thin liquids. Recent hypotension improved with reduction of meds and using GAEL hose for bp support. Pt has been on Seroquel for agitation- this may be contributing to low bp. Mood improving.       Assessment/Plan:     Coronary artery disease of native artery of native heart with stable angina pectoris (H24)  Acute ischemic right posterior cerebral artery (PCA) stroke (H)  Spasticity  -Continue ASA, clopidogrel and statin.   -left side hemiparesis.   -continue isosorbide 5 mg BID. (Hold for SBP <  95.)   -continues tizanidine 2 mg every day.     Persistent depressive disorder with anxious distress, currently severe  Agitation   -resume Lexapro 5 mg every day.   -Reduce Seroquel to 12.5 mg Q HS with goal to wean off- may be contributory to low bp.     Type 2 diabetes mellitus with hyperosmolarity without coma, unspecified whether long term insulin use (H)  -not on medications.   -diet controlled.     HTN  With recent hypotension   -metoprolol discontinued during hospitalization.   -continue isosorbide hold for SBP < 95.   -GAEL hose Q day for bp support.     Acute kidney injury superimposed on CKD  (H24)  -follow up BMP unremarkable.     Dysphagia, unspecified type  -FEES test recently. Liquids upgraded to thin.   -Continue pureed diet.   -ST following.     Active Ambulatory Problems     Diagnosis Date Noted     Abnormal stress test 01/04/2024     Acute ischemic right posterior cerebral artery (PCA) stroke (H) 01/04/2024     Cerebrovascular accident (CVA) due to occlusion of left posterior cerebral artery (H) 07/04/2022     Type 2 diabetes mellitus (H) 02/16/2017     Tinnitus, bilateral 04/04/2022     Stage 3b chronic kidney disease (H) 03/13/2021     Onychomycosis of right great toe 04/05/2023     NAFLD (nonalcoholic fatty liver disease) 10/09/2019     Mixed dyslipidemia 05/05/2011     Elevated lipoprotein(a) 04/06/2022     Coronary artery disease of native artery of native heart with stable angina pectoris (H24) 01/04/2024     Visceral obesity 11/13/2020     Periodontitis 12/06/2023     History of CVA (cerebrovascular accident) 03/10/2024     Hypotension 03/10/2024     Resolved Ambulatory Problems     Diagnosis Date Noted     No Resolved Ambulatory Problems     No Additional Past Medical History     Allergies   Allergen Reactions     Latex Hives and Rash     Anesthetics, Lorna Rash       All Meds and Allergies reviewed in the record at the facility and is the most up-to-date.    Current Outpatient Medications  "  Medication Sig     acetaminophen (TYLENOL) 325 MG tablet Take 650 mg by mouth 3 times daily     aspirin 81 MG EC tablet Take 81 mg by mouth daily     atorvastatin (LIPITOR) 80 MG tablet Take 80 mg by mouth at bedtime     clopidogrel (PLAVIX) 75 MG tablet Take 75 mg by mouth daily     escitalopram (LEXAPRO) 5 MG tablet Take 5 mg by mouth daily Start 5 mg PO daily X 1 week then increase to 10 mg daily.     gabapentin (NEURONTIN) 100 MG capsule Take 100 mg by mouth at bedtime     isosorbide dinitrate (ISORDIL) 5 MG tablet Take 1 tablet (5 mg) by mouth 2 times daily     QUEtiapine (SEROQUEL) 25 MG tablet Take 12.5 mg by mouth at bedtime     senna-docusate (SENOKOT-S/PERICOLACE) 8.6-50 MG tablet Take 1 tablet by mouth 2 times daily     tiZANidine (ZANAFLEX) 2 MG capsule Take 2 mg by mouth daily     No current facility-administered medications for this visit.       REVIEW OF SYSTEMS:   10 point review of systems reviewed and pertinent positives in the HPI.     PHYSICAL EXAMINATION:  Physical Exam     Vital signs: BP (!) 142/68   Pulse 58   Temp 97.4  F (36.3  C)   Resp 20   Ht 1.778 m (5' 10\")   Wt 72.2 kg (159 lb 3.2 oz)   SpO2 98%   BMI 22.84 kg/m    General: Awake, Alert, oriented x3, lying in bed, appropriately, follows simple commands, conversant  HEENT:PERRLA, Pink conjunctiva, moist oral mucosa. Facial symmetry noted. Tongue is midline.   NECK: Supple  CVS:  S1  S2, without murmur or gallop.   LUNG: Clear to auscultation, No wheezes, rales or rhonci.  BACK: No kyphosis of the thoracic spine  ABDOMEN: Soft, nontender to palpation, with positive bowel sounds  EXTREMITIES: Left sided hemiparesis, no pedal edema, no calf tenderness. Able to overcome gravity LLE. Some movement in left hand.   SKIN: Warm and dry  NEUROLOGIC: Intact, pulses palpable  PSYCHIATRIC: Pleasant affect today. Shooting basketball in room.       Labs:  All labs reviewed in the nursing home record and [facility]   @  Lab Results   Component " Value Date    WBC 8.2 03/07/2024     Lab Results   Component Value Date    RBC 3.80 03/07/2024     Lab Results   Component Value Date    HGB 12.1 03/07/2024     Lab Results   Component Value Date    HCT 37.1 03/07/2024     Lab Results   Component Value Date    MCV 98 03/07/2024     Lab Results   Component Value Date    MCH 31.8 03/07/2024     Lab Results   Component Value Date    MCHC 32.6 03/07/2024     Lab Results   Component Value Date    RDW 13.7 03/07/2024     Lab Results   Component Value Date     03/07/2024        @Last Comprehensive Metabolic Panel:  Sodium   Date Value Ref Range Status   03/21/2024 140 135 - 145 mmol/L Final     Comment:     Reference intervals for this test were updated on 09/26/2023 to more accurately reflect our healthy population. There may be differences in the flagging of prior results with similar values performed with this method. Interpretation of those prior results can be made in the context of the updated reference intervals.      Potassium   Date Value Ref Range Status   03/21/2024 4.1 3.4 - 5.3 mmol/L Final     Chloride   Date Value Ref Range Status   03/21/2024 105 98 - 107 mmol/L Final     Carbon Dioxide (CO2)   Date Value Ref Range Status   03/21/2024 23 22 - 29 mmol/L Final     Anion Gap   Date Value Ref Range Status   03/21/2024 12 7 - 15 mmol/L Final     Glucose   Date Value Ref Range Status   03/21/2024 103 (H) 70 - 99 mg/dL Final     Urea Nitrogen   Date Value Ref Range Status   03/21/2024 13.9 8.0 - 23.0 mg/dL Final     Creatinine   Date Value Ref Range Status   03/21/2024 1.26 (H) 0.67 - 1.17 mg/dL Final     GFR Estimate   Date Value Ref Range Status   03/21/2024 60 (L) >60 mL/min/1.73m2 Final     Calcium   Date Value Ref Range Status   03/21/2024 9.0 8.8 - 10.2 mg/dL Final       This note has been dictated using voice recognition software. Any grammatical or context distortions are unintentional and inherent to the software    Electronically signed by: Essence  Wero, CNP       Sincerely,        Essence Conteh, NP

## 2024-03-22 NOTE — PROGRESS NOTES
[facility]    Code Status:  DNR   Visit Type:   Chief Complaint   Patient presents with    TCU Follow Up     Facility:  [facility] ([facility]) [[id number]]         HPI: Shaji Markham is a 73 year old male who I am seeing today for follow up on  the TCU. Pt recently hospitalized with hypotension. Past medical history includes CAD with recent CVA, depression with anxiety, DM2, CKD stage 3b. Pt in PT when he became dizzy, weak with blurred vision. BP low in the sbp 70s. Pt c/o chest pain and was given nitroglycerin. Pt given fluids and bp improved. Metoprolol discontinued during hospitalization. Pt continues on isosorbide. DM2 currently not on meds. CAD on clopidogrel, ASA and atorvastatin. Repeat CT showed subacute/chronic appearing infarction right parasagittal occipital lobe within the right PCA vascular territory which appeared to have undergone some temporal evolution from prior head CT. He was seen by ST during hospitalization and downgraded to pureed diet.     Transitional Care Course: Today pt lying in bed. CVA with left side weakness. He is gaining strength in his left side greater movement in LLE. Spasticity improving with tizinadine. Neuropathic pain improved with low dose gabapentin. Mood better today. He is being seen by ACP and continues on Lexapro. Dysphagia. FEES test repeated at facility. Pt upgraded to thin liquids. Recent hypotension improved with reduction of meds and using GAEL hose for bp support. Pt has been on Seroquel for agitation- this may be contributing to low bp. Mood improving.       Assessment/Plan:     Coronary artery disease of native artery of native heart with stable angina pectoris (H24)  Acute ischemic right posterior cerebral artery (PCA) stroke (H)  Spasticity  -Continue ASA, clopidogrel and statin.   -left side hemiparesis.   -continue isosorbide 5 mg BID. (Hold for SBP < 95.)   -continues tizanidine 2 mg every day.     Persistent depressive disorder with  anxious distress, currently severe  Agitation   -resume Lexapro 5 mg every day.   -Reduce Seroquel to 12.5 mg Q HS with goal to wean off- may be contributory to low bp.     Type 2 diabetes mellitus with hyperosmolarity without coma, unspecified whether long term insulin use (H)  -not on medications.   -diet controlled.     HTN  With recent hypotension   -metoprolol discontinued during hospitalization.   -continue isosorbide hold for SBP < 95.   -GAEL hose Q day for bp support.     Acute kidney injury superimposed on CKD  (H24)  -follow up BMP unremarkable.     Dysphagia, unspecified type  -FEES test recently. Liquids upgraded to thin.   -Continue pureed diet.   -ST following.     Active Ambulatory Problems     Diagnosis Date Noted    Abnormal stress test 01/04/2024    Acute ischemic right posterior cerebral artery (PCA) stroke (H) 01/04/2024    Cerebrovascular accident (CVA) due to occlusion of left posterior cerebral artery (H) 07/04/2022    Type 2 diabetes mellitus (H) 02/16/2017    Tinnitus, bilateral 04/04/2022    Stage 3b chronic kidney disease (H) 03/13/2021    Onychomycosis of right great toe 04/05/2023    NAFLD (nonalcoholic fatty liver disease) 10/09/2019    Mixed dyslipidemia 05/05/2011    Elevated lipoprotein(a) 04/06/2022    Coronary artery disease of native artery of native heart with stable angina pectoris (H24) 01/04/2024    Visceral obesity 11/13/2020    Periodontitis 12/06/2023    History of CVA (cerebrovascular accident) 03/10/2024    Hypotension 03/10/2024     Resolved Ambulatory Problems     Diagnosis Date Noted    No Resolved Ambulatory Problems     No Additional Past Medical History     Allergies   Allergen Reactions    Latex Hives and Rash    Anesthetics, Lorna Rash       All Meds and Allergies reviewed in the record at the facility and is the most up-to-date.    Current Outpatient Medications   Medication Sig    acetaminophen (TYLENOL) 325 MG tablet Take 650 mg by mouth 3 times daily     "aspirin 81 MG EC tablet Take 81 mg by mouth daily    atorvastatin (LIPITOR) 80 MG tablet Take 80 mg by mouth at bedtime    clopidogrel (PLAVIX) 75 MG tablet Take 75 mg by mouth daily    escitalopram (LEXAPRO) 5 MG tablet Take 5 mg by mouth daily Start 5 mg PO daily X 1 week then increase to 10 mg daily.    gabapentin (NEURONTIN) 100 MG capsule Take 100 mg by mouth at bedtime    isosorbide dinitrate (ISORDIL) 5 MG tablet Take 1 tablet (5 mg) by mouth 2 times daily    QUEtiapine (SEROQUEL) 25 MG tablet Take 12.5 mg by mouth at bedtime    senna-docusate (SENOKOT-S/PERICOLACE) 8.6-50 MG tablet Take 1 tablet by mouth 2 times daily    tiZANidine (ZANAFLEX) 2 MG capsule Take 2 mg by mouth daily     No current facility-administered medications for this visit.       REVIEW OF SYSTEMS:   10 point review of systems reviewed and pertinent positives in the HPI.     PHYSICAL EXAMINATION:  Physical Exam     Vital signs: BP (!) 142/68   Pulse 58   Temp 97.4  F (36.3  C)   Resp 20   Ht 1.778 m (5' 10\")   Wt 72.2 kg (159 lb 3.2 oz)   SpO2 98%   BMI 22.84 kg/m    General: Awake, Alert, oriented x3, lying in bed, appropriately, follows simple commands, conversant  HEENT:PERRLA, Pink conjunctiva, moist oral mucosa. Facial symmetry noted. Tongue is midline.   NECK: Supple  CVS:  S1  S2, without murmur or gallop.   LUNG: Clear to auscultation, No wheezes, rales or rhonci.  BACK: No kyphosis of the thoracic spine  ABDOMEN: Soft, nontender to palpation, with positive bowel sounds  EXTREMITIES: Left sided hemiparesis, no pedal edema, no calf tenderness. Able to overcome gravity LLE. Some movement in left hand.   SKIN: Warm and dry  NEUROLOGIC: Intact, pulses palpable  PSYCHIATRIC: Pleasant affect today. Shooting basketball in room.       Labs:  All labs reviewed in the nursing home record and [employer]   @  Lab Results   Component Value Date    WBC 8.2 03/07/2024     Lab Results   Component Value Date    RBC 3.80 03/07/2024     Lab Results "   Component Value Date    HGB 12.1 03/07/2024     Lab Results   Component Value Date    HCT 37.1 03/07/2024     Lab Results   Component Value Date    MCV 98 03/07/2024     Lab Results   Component Value Date    MCH 31.8 03/07/2024     Lab Results   Component Value Date    MCHC 32.6 03/07/2024     Lab Results   Component Value Date    RDW 13.7 03/07/2024     Lab Results   Component Value Date     03/07/2024        @Last Comprehensive Metabolic Panel:  Sodium   Date Value Ref Range Status   03/21/2024 140 135 - 145 mmol/L Final     Comment:     Reference intervals for this test were updated on 09/26/2023 to more accurately reflect our healthy population. There may be differences in the flagging of prior results with similar values performed with this method. Interpretation of those prior results can be made in the context of the updated reference intervals.      Potassium   Date Value Ref Range Status   03/21/2024 4.1 3.4 - 5.3 mmol/L Final     Chloride   Date Value Ref Range Status   03/21/2024 105 98 - 107 mmol/L Final     Carbon Dioxide (CO2)   Date Value Ref Range Status   03/21/2024 23 22 - 29 mmol/L Final     Anion Gap   Date Value Ref Range Status   03/21/2024 12 7 - 15 mmol/L Final     Glucose   Date Value Ref Range Status   03/21/2024 103 (H) 70 - 99 mg/dL Final     Urea Nitrogen   Date Value Ref Range Status   03/21/2024 13.9 8.0 - 23.0 mg/dL Final     Creatinine   Date Value Ref Range Status   03/21/2024 1.26 (H) 0.67 - 1.17 mg/dL Final     GFR Estimate   Date Value Ref Range Status   03/21/2024 60 (L) >60 mL/min/1.73m2 Final     Calcium   Date Value Ref Range Status   03/21/2024 9.0 8.8 - 10.2 mg/dL Final       This note has been dictated using voice recognition software. Any grammatical or context distortions are unintentional and inherent to the software    Electronically signed by: Essence Conteh CNP

## 2024-03-26 ENCOUNTER — TRANSITIONAL CARE UNIT VISIT (OUTPATIENT)
Dept: GERIATRICS | Facility: CLINIC | Age: 74
End: 2024-03-26
Payer: COMMERCIAL

## 2024-03-26 VITALS
WEIGHT: 159.6 LBS | OXYGEN SATURATION: 98 % | RESPIRATION RATE: 18 BRPM | SYSTOLIC BLOOD PRESSURE: 157 MMHG | BODY MASS INDEX: 22.85 KG/M2 | HEIGHT: 70 IN | HEART RATE: 58 BPM | DIASTOLIC BLOOD PRESSURE: 71 MMHG | TEMPERATURE: 97.5 F

## 2024-03-26 DIAGNOSIS — I95.1 ORTHOSTATIC HYPOTENSION: ICD-10-CM

## 2024-03-26 DIAGNOSIS — I63.531 ACUTE ISCHEMIC RIGHT POSTERIOR CEREBRAL ARTERY (PCA) STROKE (H): Primary | ICD-10-CM

## 2024-03-26 DIAGNOSIS — I25.118 CORONARY ARTERY DISEASE OF NATIVE ARTERY OF NATIVE HEART WITH STABLE ANGINA PECTORIS (H): ICD-10-CM

## 2024-03-26 DIAGNOSIS — E11.00 TYPE 2 DIABETES MELLITUS WITH HYPEROSMOLARITY WITHOUT COMA, UNSPECIFIED WHETHER LONG TERM INSULIN USE (H): ICD-10-CM

## 2024-03-26 DIAGNOSIS — R13.10 DYSPHAGIA, UNSPECIFIED TYPE: ICD-10-CM

## 2024-03-26 DIAGNOSIS — F34.1 PERSISTENT DEPRESSIVE DISORDER WITH ANXIOUS DISTRESS, CURRENTLY SEVERE: ICD-10-CM

## 2024-03-26 PROCEDURE — 99309 SBSQ NF CARE MODERATE MDM 30: CPT | Performed by: NURSE PRACTITIONER

## 2024-03-26 NOTE — LETTER
3/26/2024        RE: Shaji Markham  [address]  [facility] [address]        [facility]    Code Status:  DNR   Visit Type:   Chief Complaint   Patient presents with     TCU Follow Up     Facility:  [facility]) [[id number]]         HPI: Shaji Markham is a 73 year old male who I am seeing today for follow up on  the TCU. Pt recently hospitalized with hypotension. Past medical history includes CAD with recent CVA, depression with anxiety, DM2, CKD stage 3b. Pt in PT when he became dizzy, weak with blurred vision. BP low in the sbp 70s. Pt c/o chest pain and was given nitroglycerin. Pt given fluids and bp improved. Metoprolol discontinued during hospitalization. Pt continues on isosorbide. DM2 currently not on meds. CAD on clopidogrel, ASA and atorvastatin. Repeat CT showed subacute/chronic appearing infarction right parasagittal occipital lobe within the right PCA vascular territory which appeared to have undergone some temporal evolution from prior head CT. He was seen by ST during hospitalization and downgraded to pureed diet.     Transitional Care Course: Today pt lying in bed. CVA with left side weakness. Spasticity to LE. He is gaining strength in left side > in LE. Pt mood improving. He continues on Lexapro. He is also being followed by [facility]. Dysphagia. Pt underwent follow up FEEs test and has been upgrade to thin liquids. Hypotension resolved. Seroquel reduced to once daily.       Assessment/Plan:     Coronary artery disease of native artery of native heart with stable angina pectoris (H24)  Acute ischemic right posterior cerebral artery (PCA) stroke (H)  Spasticity  -Continue ASA, clopidogrel and statin.   -left side hemiparesis.   -continue isosorbide 5 mg BID. (Hold for SBP < 95.)   -continues tizanidine 2 mg every day.     Persistent depressive disorder with anxious distress, currently severe  Agitation   -resume Lexapro 5 mg every day.   -Seroquel recently reduced to  12.5 mg Q HS - may be contributory to low bp.     Type 2 diabetes mellitus with hyperosmolarity without coma, unspecified whether long term insulin use (H)  -not on medications.   -diet controlled.     HTN  With recent hypotension   -metoprolol discontinued during hospitalization.   -continue isosorbide hold for SBP < 95.   -GAEL hose Q day for bp support.     Acute kidney injury superimposed on CKD  (H24)  -follow up BMP unremarkable.     Dysphagia, unspecified type  -FEES test recently. Liquids upgraded to thin.   -Continues on pureed diet.   -ST following.     Active Ambulatory Problems     Diagnosis Date Noted     Abnormal stress test 01/04/2024     Acute ischemic right posterior cerebral artery (PCA) stroke (H) 01/04/2024     Cerebrovascular accident (CVA) due to occlusion of left posterior cerebral artery (H) 07/04/2022     Type 2 diabetes mellitus (H) 02/16/2017     Tinnitus, bilateral 04/04/2022     Stage 3b chronic kidney disease (H) 03/13/2021     Onychomycosis of right great toe 04/05/2023     NAFLD (nonalcoholic fatty liver disease) 10/09/2019     Mixed dyslipidemia 05/05/2011     Elevated lipoprotein(a) 04/06/2022     Coronary artery disease of native artery of native heart with stable angina pectoris (H24) 01/04/2024     Visceral obesity 11/13/2020     Periodontitis 12/06/2023     History of CVA (cerebrovascular accident) 03/10/2024     Hypotension 03/10/2024     Resolved Ambulatory Problems     Diagnosis Date Noted     No Resolved Ambulatory Problems     No Additional Past Medical History     Allergies   Allergen Reactions     Latex Hives and Rash     Anesthetics, Lorna Rash       All Meds and Allergies reviewed in the record at the facility and is the most up-to-date.    Current Outpatient Medications   Medication Sig     acetaminophen (TYLENOL) 325 MG tablet Take 650 mg by mouth 3 times daily     aspirin 81 MG EC tablet Take 81 mg by mouth daily     atorvastatin (LIPITOR) 80 MG tablet Take 80 mg by  "mouth at bedtime     clopidogrel (PLAVIX) 75 MG tablet Take 75 mg by mouth daily     escitalopram (LEXAPRO) 5 MG tablet Take 5 mg by mouth daily Start 5 mg PO daily X 1 week then increase to 10 mg daily.     gabapentin (NEURONTIN) 100 MG capsule Take 100 mg by mouth at bedtime     isosorbide dinitrate (ISORDIL) 5 MG tablet Take 1 tablet (5 mg) by mouth 2 times daily     QUEtiapine (SEROQUEL) 25 MG tablet Take 12.5 mg by mouth at bedtime     senna-docusate (SENOKOT-S/PERICOLACE) 8.6-50 MG tablet Take 1 tablet by mouth 2 times daily     tiZANidine (ZANAFLEX) 2 MG capsule Take 2 mg by mouth daily     No current facility-administered medications for this visit.       REVIEW OF SYSTEMS:   10 point review of systems reviewed and pertinent positives in the HPI.     PHYSICAL EXAMINATION:  Physical Exam     Vital signs: BP (!) 157/71   Pulse 58   Temp 97.5  F (36.4  C)   Resp 18   Ht 1.778 m (5' 10\")   Wt 72.4 kg (159 lb 9.6 oz)   SpO2 98%   BMI 22.90 kg/m    General: Awake, Alert, oriented x3, lying in bed, appropriately, follows simple commands, conversant  HEENT:PERRLA, Pink conjunctiva, moist oral mucosa. Facial symmetry noted. Tongue is midline.   NECK: Supple  CVS:  S1  S2, without murmur or gallop.   LUNG: Clear to auscultation, No wheezes, rales or rhonci.  BACK: No kyphosis of the thoracic spine  ABDOMEN: Soft, nontender to palpation, with positive bowel sounds  EXTREMITIES: Left sided hemiparesis, no pedal edema, no calf tenderness. Able to overcome gravity LLE. Some movement in left hand.   SKIN: Warm and dry  NEUROLOGIC: Intact, pulses palpable  PSYCHIATRIC: Mood stable.       Labs:  All labs reviewed in the nursing home record and [facility]   @  Lab Results   Component Value Date    WBC 8.2 03/07/2024     Lab Results   Component Value Date    RBC 3.80 03/07/2024     Lab Results   Component Value Date    HGB 12.1 03/07/2024     Lab Results   Component Value Date    HCT 37.1 03/07/2024     Lab Results "   Component Value Date    MCV 98 03/07/2024     Lab Results   Component Value Date    MCH 31.8 03/07/2024     Lab Results   Component Value Date    MCHC 32.6 03/07/2024     Lab Results   Component Value Date    RDW 13.7 03/07/2024     Lab Results   Component Value Date     03/07/2024        @Last Comprehensive Metabolic Panel:  Sodium   Date Value Ref Range Status   03/21/2024 140 135 - 145 mmol/L Final     Comment:     Reference intervals for this test were updated on 09/26/2023 to more accurately reflect our healthy population. There may be differences in the flagging of prior results with similar values performed with this method. Interpretation of those prior results can be made in the context of the updated reference intervals.      Potassium   Date Value Ref Range Status   03/21/2024 4.1 3.4 - 5.3 mmol/L Final     Chloride   Date Value Ref Range Status   03/21/2024 105 98 - 107 mmol/L Final     Carbon Dioxide (CO2)   Date Value Ref Range Status   03/21/2024 23 22 - 29 mmol/L Final     Anion Gap   Date Value Ref Range Status   03/21/2024 12 7 - 15 mmol/L Final     Glucose   Date Value Ref Range Status   03/21/2024 103 (H) 70 - 99 mg/dL Final     Urea Nitrogen   Date Value Ref Range Status   03/21/2024 13.9 8.0 - 23.0 mg/dL Final     Creatinine   Date Value Ref Range Status   03/21/2024 1.26 (H) 0.67 - 1.17 mg/dL Final     GFR Estimate   Date Value Ref Range Status   03/21/2024 60 (L) >60 mL/min/1.73m2 Final     Calcium   Date Value Ref Range Status   03/21/2024 9.0 8.8 - 10.2 mg/dL Final       This note has been dictated using voice recognition software. Any grammatical or context distortions are unintentional and inherent to the software    Electronically signed by: Essence Conteh CNP       Sincerely,        Essence Conteh, NP

## 2024-03-27 NOTE — PROGRESS NOTES
[facility]    Code Status:  DNR   Visit Type:   Chief Complaint   Patient presents with    TCU Follow Up     Facility:  [facility] ([facility]) [[id number]]         HPI: Shaji Markham is a 73 year old male who I am seeing today for follow up on  the TCU. Pt recently hospitalized with hypotension. Past medical history includes CAD with recent CVA, depression with anxiety, DM2, CKD stage 3b. Pt in PT when he became dizzy, weak with blurred vision. BP low in the sbp 70s. Pt c/o chest pain and was given nitroglycerin. Pt given fluids and bp improved. Metoprolol discontinued during hospitalization. Pt continues on isosorbide. DM2 currently not on meds. CAD on clopidogrel, ASA and atorvastatin. Repeat CT showed subacute/chronic appearing infarction right parasagittal occipital lobe within the right PCA vascular territory which appeared to have undergone some temporal evolution from prior head CT. He was seen by ST during hospitalization and downgraded to pureed diet.     Transitional Care Course: Today pt lying in bed. CVA with left side weakness. Spasticity to LE. He is gaining strength in left side > in LE. Pt mood improving. He continues on Lexapro. He is also being followed by [facility]. Dysphagia. Pt underwent follow up FEEs test and has been upgrade to thin liquids. Hypotension resolved. Seroquel reduced to once daily.       Assessment/Plan:     Coronary artery disease of native artery of native heart with stable angina pectoris (H24)  Acute ischemic right posterior cerebral artery (PCA) stroke (H)  Spasticity  -Continue ASA, clopidogrel and statin.   -left side hemiparesis.   -continue isosorbide 5 mg BID. (Hold for SBP < 95.)   -continues tizanidine 2 mg every day.     Persistent depressive disorder with anxious distress, currently severe  Agitation   -resume Lexapro 5 mg every day.   -Seroquel recently reduced to 12.5 mg Q HS - may be contributory to low bp.     Type 2 diabetes mellitus with  hyperosmolarity without coma, unspecified whether long term insulin use (H)  -not on medications.   -diet controlled.     HTN  With recent hypotension   -metoprolol discontinued during hospitalization.   -continue isosorbide hold for SBP < 95.   -GAEL hose Q day for bp support.     Acute kidney injury superimposed on CKD  (H24)  -follow up BMP unremarkable.     Dysphagia, unspecified type  -FEES test recently. Liquids upgraded to thin.   -Continues on pureed diet.   -ST following.     Active Ambulatory Problems     Diagnosis Date Noted    Abnormal stress test 01/04/2024    Acute ischemic right posterior cerebral artery (PCA) stroke (H) 01/04/2024    Cerebrovascular accident (CVA) due to occlusion of left posterior cerebral artery (H) 07/04/2022    Type 2 diabetes mellitus (H) 02/16/2017    Tinnitus, bilateral 04/04/2022    Stage 3b chronic kidney disease (H) 03/13/2021    Onychomycosis of right great toe 04/05/2023    NAFLD (nonalcoholic fatty liver disease) 10/09/2019    Mixed dyslipidemia 05/05/2011    Elevated lipoprotein(a) 04/06/2022    Coronary artery disease of native artery of native heart with stable angina pectoris (H24) 01/04/2024    Visceral obesity 11/13/2020    Periodontitis 12/06/2023    History of CVA (cerebrovascular accident) 03/10/2024    Hypotension 03/10/2024     Resolved Ambulatory Problems     Diagnosis Date Noted    No Resolved Ambulatory Problems     No Additional Past Medical History     Allergies   Allergen Reactions    Latex Hives and Rash    Anesthetics, Lorna Rash       All Meds and Allergies reviewed in the record at the facility and is the most up-to-date.    Current Outpatient Medications   Medication Sig    acetaminophen (TYLENOL) 325 MG tablet Take 650 mg by mouth 3 times daily    aspirin 81 MG EC tablet Take 81 mg by mouth daily    atorvastatin (LIPITOR) 80 MG tablet Take 80 mg by mouth at bedtime    clopidogrel (PLAVIX) 75 MG tablet Take 75 mg by mouth daily    escitalopram  "(LEXAPRO) 5 MG tablet Take 5 mg by mouth daily Start 5 mg PO daily X 1 week then increase to 10 mg daily.    gabapentin (NEURONTIN) 100 MG capsule Take 100 mg by mouth at bedtime    isosorbide dinitrate (ISORDIL) 5 MG tablet Take 1 tablet (5 mg) by mouth 2 times daily    QUEtiapine (SEROQUEL) 25 MG tablet Take 12.5 mg by mouth at bedtime    senna-docusate (SENOKOT-S/PERICOLACE) 8.6-50 MG tablet Take 1 tablet by mouth 2 times daily    tiZANidine (ZANAFLEX) 2 MG capsule Take 2 mg by mouth daily     No current facility-administered medications for this visit.       REVIEW OF SYSTEMS:   10 point review of systems reviewed and pertinent positives in the HPI.     PHYSICAL EXAMINATION:  Physical Exam     Vital signs: BP (!) 157/71   Pulse 58   Temp 97.5  F (36.4  C)   Resp 18   Ht 1.778 m (5' 10\")   Wt 72.4 kg (159 lb 9.6 oz)   SpO2 98%   BMI 22.90 kg/m    General: Awake, Alert, oriented x3, lying in bed, appropriately, follows simple commands, conversant  HEENT:PERRLA, Pink conjunctiva, moist oral mucosa. Facial symmetry noted. Tongue is midline.   NECK: Supple  CVS:  S1  S2, without murmur or gallop.   LUNG: Clear to auscultation, No wheezes, rales or rhonci.  BACK: No kyphosis of the thoracic spine  ABDOMEN: Soft, nontender to palpation, with positive bowel sounds  EXTREMITIES: Left sided hemiparesis, no pedal edema, no calf tenderness. Able to overcome gravity LLE. Some movement in left hand.   SKIN: Warm and dry  NEUROLOGIC: Intact, pulses palpable  PSYCHIATRIC: Mood stable.       Labs:  All labs reviewed in the nursing home record and [facility]   @  Lab Results   Component Value Date    WBC 8.2 03/07/2024     Lab Results   Component Value Date    RBC 3.80 03/07/2024     Lab Results   Component Value Date    HGB 12.1 03/07/2024     Lab Results   Component Value Date    HCT 37.1 03/07/2024     Lab Results   Component Value Date    MCV 98 03/07/2024     Lab Results   Component Value Date    MCH 31.8 03/07/2024     Lab " Results   Component Value Date    MCHC 32.6 03/07/2024     Lab Results   Component Value Date    RDW 13.7 03/07/2024     Lab Results   Component Value Date     03/07/2024        @Last Comprehensive Metabolic Panel:  Sodium   Date Value Ref Range Status   03/21/2024 140 135 - 145 mmol/L Final     Comment:     Reference intervals for this test were updated on 09/26/2023 to more accurately reflect our healthy population. There may be differences in the flagging of prior results with similar values performed with this method. Interpretation of those prior results can be made in the context of the updated reference intervals.      Potassium   Date Value Ref Range Status   03/21/2024 4.1 3.4 - 5.3 mmol/L Final     Chloride   Date Value Ref Range Status   03/21/2024 105 98 - 107 mmol/L Final     Carbon Dioxide (CO2)   Date Value Ref Range Status   03/21/2024 23 22 - 29 mmol/L Final     Anion Gap   Date Value Ref Range Status   03/21/2024 12 7 - 15 mmol/L Final     Glucose   Date Value Ref Range Status   03/21/2024 103 (H) 70 - 99 mg/dL Final     Urea Nitrogen   Date Value Ref Range Status   03/21/2024 13.9 8.0 - 23.0 mg/dL Final     Creatinine   Date Value Ref Range Status   03/21/2024 1.26 (H) 0.67 - 1.17 mg/dL Final     GFR Estimate   Date Value Ref Range Status   03/21/2024 60 (L) >60 mL/min/1.73m2 Final     Calcium   Date Value Ref Range Status   03/21/2024 9.0 8.8 - 10.2 mg/dL Final       This note has been dictated using voice recognition software. Any grammatical or context distortions are unintentional and inherent to the software    Electronically signed by: Essence Conteh, CNP

## 2024-04-02 ENCOUNTER — TRANSITIONAL CARE UNIT VISIT (OUTPATIENT)
Dept: GERIATRICS | Facility: CLINIC | Age: 74
End: 2024-04-02
Payer: COMMERCIAL

## 2024-04-02 VITALS
DIASTOLIC BLOOD PRESSURE: 61 MMHG | BODY MASS INDEX: 23.28 KG/M2 | RESPIRATION RATE: 16 BRPM | TEMPERATURE: 97.8 F | SYSTOLIC BLOOD PRESSURE: 105 MMHG | HEIGHT: 70 IN | WEIGHT: 162.6 LBS | OXYGEN SATURATION: 93 % | HEART RATE: 59 BPM

## 2024-04-02 DIAGNOSIS — F34.1 PERSISTENT DEPRESSIVE DISORDER WITH ANXIOUS DISTRESS, CURRENTLY SEVERE: ICD-10-CM

## 2024-04-02 DIAGNOSIS — R13.10 DYSPHAGIA, UNSPECIFIED TYPE: ICD-10-CM

## 2024-04-02 DIAGNOSIS — I25.118 CORONARY ARTERY DISEASE OF NATIVE ARTERY OF NATIVE HEART WITH STABLE ANGINA PECTORIS (H): ICD-10-CM

## 2024-04-02 DIAGNOSIS — E11.00 TYPE 2 DIABETES MELLITUS WITH HYPEROSMOLARITY WITHOUT COMA, UNSPECIFIED WHETHER LONG TERM INSULIN USE (H): ICD-10-CM

## 2024-04-02 DIAGNOSIS — I63.531 ACUTE ISCHEMIC RIGHT POSTERIOR CEREBRAL ARTERY (PCA) STROKE (H): Primary | ICD-10-CM

## 2024-04-02 PROCEDURE — 99309 SBSQ NF CARE MODERATE MDM 30: CPT | Performed by: NURSE PRACTITIONER

## 2024-04-02 NOTE — LETTER
4/2/2024        RE: Shaji Markham  [address]  [facility] [address]        [facility]    Code Status:  DNR   Visit Type:   Chief Complaint   Patient presents with     TCU Follow Up     Facility:  [facility]) [[id number]]         HPI: Shaji Markham is a 73 year old male who I am seeing today for follow up on  the TCU. Pt recently hospitalized with hypotension. Past medical history includes CAD with recent CVA, depression with anxiety, DM2, CKD stage 3b. Pt in PT when he became dizzy, weak with blurred vision. BP low in the sbp 70s. Pt c/o chest pain and was given nitroglycerin. Pt given fluids and bp improved. Metoprolol discontinued during hospitalization. Pt continues on isosorbide. DM2 currently not on meds. CAD on clopidogrel, ASA and atorvastatin. Repeat CT showed subacute/chronic appearing infarction right parasagittal occipital lobe within the right PCA vascular territory which appeared to have undergone some temporal evolution from prior head CT. He was seen by ST during hospitalization and downgraded to pureed diet.     Transitional Care Course: Today pt sitting up in wheelchair. CVA with left side weakness. Pt is gaining some movement in his left hand and leg. He continues on tizanidine for spasticity. Dysphagia. He is being followed by ST. Pt admitted with depressive symptoms. Mood is improving with Lexapro. He is being followed by [facility]. Recent reduction in Seroquel. Pt had a fall yesterday slipping out of wheelchair and landing on his bottom. He did not his head. No LOC. Pt continues with occ low bp. Isosorbide reduced. Tubi  for bp support.         Assessment/Plan:     Coronary artery disease of native artery of native heart with stable angina pectoris (H24)  Acute ischemic right posterior cerebral artery (PCA) stroke (H)  Spasticity  -Continue ASA, clopidogrel and statin.   -left side hemiparesis.   -continue isosorbide 5 mg BID. (Hold for SBP < 95.)    -continues tizanidine 2 mg every day.     Persistent depressive disorder with anxious distress, currently severe  Agitation   -resume Lexapro 5 mg every day.   -Seroquel recently reduced to 12.5 mg Q HS - will attempt to wean off.     Type 2 diabetes mellitus with hyperosmolarity without coma, unspecified whether long term insulin use (H)  -not on medications.   -diet controlled.     HTN  With recent hypotension   -metoprolol discontinued during hospitalization.   -continue isosorbide hold for SBP < 95.   -GAEL hose Q day for bp support.     Acute kidney injury superimposed on CKD  (H24)  -Creatine 1.2-1.3.     Dysphagia, unspecified type  -FEES test recently. Liquids upgraded to thin.   -Continues on pureed diet.   -ST following.     Active Ambulatory Problems     Diagnosis Date Noted     Abnormal stress test 01/04/2024     Acute ischemic right posterior cerebral artery (PCA) stroke (H) 01/04/2024     Cerebrovascular accident (CVA) due to occlusion of left posterior cerebral artery (H) 07/04/2022     Type 2 diabetes mellitus (H) 02/16/2017     Tinnitus, bilateral 04/04/2022     Stage 3b chronic kidney disease (H) 03/13/2021     Onychomycosis of right great toe 04/05/2023     NAFLD (nonalcoholic fatty liver disease) 10/09/2019     Mixed dyslipidemia 05/05/2011     Elevated lipoprotein(a) 04/06/2022     Coronary artery disease of native artery of native heart with stable angina pectoris (H24) 01/04/2024     Visceral obesity 11/13/2020     Periodontitis 12/06/2023     History of CVA (cerebrovascular accident) 03/10/2024     Hypotension 03/10/2024     Resolved Ambulatory Problems     Diagnosis Date Noted     No Resolved Ambulatory Problems     No Additional Past Medical History     Allergies   Allergen Reactions     Latex Hives and Rash     Anesthetics, Lorna Rash       All Meds and Allergies reviewed in the record at the facility and is the most up-to-date.    Current Outpatient Medications   Medication Sig Dispense  "Refill     acetaminophen (TYLENOL) 325 MG tablet Take 650 mg by mouth 3 times daily       aspirin 81 MG EC tablet Take 81 mg by mouth daily       atorvastatin (LIPITOR) 80 MG tablet Take 80 mg by mouth at bedtime       clopidogrel (PLAVIX) 75 MG tablet Take 75 mg by mouth daily       escitalopram (LEXAPRO) 5 MG tablet Take 5 mg by mouth daily Start 5 mg PO daily X 1 week then increase to 10 mg daily.       gabapentin (NEURONTIN) 100 MG capsule Take 100 mg by mouth at bedtime       isosorbide dinitrate (ISORDIL) 5 MG tablet Take 1 tablet (5 mg) by mouth 2 times daily       QUEtiapine (SEROQUEL) 25 MG tablet Take 12.5 mg by mouth at bedtime       senna-docusate (SENOKOT-S/PERICOLACE) 8.6-50 MG tablet Take 1 tablet by mouth 2 times daily       tiZANidine (ZANAFLEX) 2 MG capsule Take 2 mg by mouth daily       No current facility-administered medications for this visit.       REVIEW OF SYSTEMS:   10 point review of systems reviewed and pertinent positives in the HPI.     PHYSICAL EXAMINATION:  Physical Exam     Vital signs: /61   Pulse 59   Temp 97.8  F (36.6  C)   Resp 16   Ht 1.778 m (5' 10\")   Wt 73.8 kg (162 lb 9.6 oz)   SpO2 93%   BMI 23.33 kg/m    General: Awake, Alert, oriented x3, sitting up in wheelchair,  appropriately, follows simple commands, conversant  HEENT:PERRLA, Pink conjunctiva, moist oral mucosa.   NECK: Supple  CVS:  S1  S2, without murmur or gallop.   LUNG: Clear to auscultation, No wheezes, rales or rhonci.  BACK: No kyphosis of the thoracic spine  ABDOMEN: Soft, nontender to palpation, with positive bowel sounds  EXTREMITIES: Left sided hemiparesis, no pedal edema, no calf tenderness. Able to overcome gravity LLE. Some movement in left hand.   SKIN: Warm and dry  NEUROLOGIC: Intact, pulses palpable  PSYCHIATRIC: Mood stable.       Labs:  All labs reviewed in the nursing home record and [facility]   @  Lab Results   Component Value Date    WBC 8.2 03/07/2024     Lab Results   Component Value " Date    RBC 3.80 03/07/2024     Lab Results   Component Value Date    HGB 12.1 03/07/2024     Lab Results   Component Value Date    HCT 37.1 03/07/2024     Lab Results   Component Value Date    MCV 98 03/07/2024     Lab Results   Component Value Date    MCH 31.8 03/07/2024     Lab Results   Component Value Date    MCHC 32.6 03/07/2024     Lab Results   Component Value Date    RDW 13.7 03/07/2024     Lab Results   Component Value Date     03/07/2024        @Last Comprehensive Metabolic Panel:  Sodium   Date Value Ref Range Status   03/21/2024 140 135 - 145 mmol/L Final     Comment:     Reference intervals for this test were updated on 09/26/2023 to more accurately reflect our healthy population. There may be differences in the flagging of prior results with similar values performed with this method. Interpretation of those prior results can be made in the context of the updated reference intervals.      Potassium   Date Value Ref Range Status   03/21/2024 4.1 3.4 - 5.3 mmol/L Final     Chloride   Date Value Ref Range Status   03/21/2024 105 98 - 107 mmol/L Final     Carbon Dioxide (CO2)   Date Value Ref Range Status   03/21/2024 23 22 - 29 mmol/L Final     Anion Gap   Date Value Ref Range Status   03/21/2024 12 7 - 15 mmol/L Final     Glucose   Date Value Ref Range Status   03/21/2024 103 (H) 70 - 99 mg/dL Final     Urea Nitrogen   Date Value Ref Range Status   03/21/2024 13.9 8.0 - 23.0 mg/dL Final     Creatinine   Date Value Ref Range Status   03/21/2024 1.26 (H) 0.67 - 1.17 mg/dL Final     GFR Estimate   Date Value Ref Range Status   03/21/2024 60 (L) >60 mL/min/1.73m2 Final     Calcium   Date Value Ref Range Status   03/21/2024 9.0 8.8 - 10.2 mg/dL Final       This note has been dictated using voice recognition software. Any grammatical or context distortions are unintentional and inherent to the software    Electronically signed by: Essence Conteh CNP       Sincerely,        Essence Conteh NP

## 2024-04-03 ENCOUNTER — LAB REQUISITION (OUTPATIENT)
Dept: LAB | Facility: CLINIC | Age: 74
End: 2024-04-03
Payer: COMMERCIAL

## 2024-04-03 DIAGNOSIS — I95.9 HYPOTENSION, UNSPECIFIED: ICD-10-CM

## 2024-04-03 NOTE — PROGRESS NOTES
[facility]    Code Status:  DNR   Visit Type:   Chief Complaint   Patient presents with    TCU Follow Up     Facility:  [facility] ([facility]) [[id number]]         HPI: Shaji Markham is a 73 year old male who I am seeing today for follow up on  the TCU. Pt recently hospitalized with hypotension. Past medical history includes CAD with recent CVA, depression with anxiety, DM2, CKD stage 3b. Pt in PT when he became dizzy, weak with blurred vision. BP low in the sbp 70s. Pt c/o chest pain and was given nitroglycerin. Pt given fluids and bp improved. Metoprolol discontinued during hospitalization. Pt continues on isosorbide. DM2 currently not on meds. CAD on clopidogrel, ASA and atorvastatin. Repeat CT showed subacute/chronic appearing infarction right parasagittal occipital lobe within the right PCA vascular territory which appeared to have undergone some temporal evolution from prior head CT. He was seen by ST during hospitalization and downgraded to pureed diet.     Transitional Care Course: Today pt sitting up in wheelchair. CVA with left side weakness. Pt is gaining some movement in his left hand and leg. He continues on tizanidine for spasticity. Dysphagia. He is being followed by ST. Pt admitted with depressive symptoms. Mood is improving with Lexapro. He is being followed by [facility]. Recent reduction in Seroquel. Pt had a fall yesterday slipping out of wheelchair and landing on his bottom. He did not his head. No LOC. Pt continues with occ low bp. Isosorbide reduced. Tubi  for bp support.         Assessment/Plan:     Coronary artery disease of native artery of native heart with stable angina pectoris (H24)  Acute ischemic right posterior cerebral artery (PCA) stroke (H)  Spasticity  -Continue ASA, clopidogrel and statin.   -left side hemiparesis.   -continue isosorbide 5 mg BID. (Hold for SBP < 95.)   -continues tizanidine 2 mg every day.     Persistent depressive disorder with anxious  distress, currently severe  Agitation   -resume Lexapro 5 mg every day.   -Seroquel recently reduced to 12.5 mg Q HS - will attempt to wean off.     Type 2 diabetes mellitus with hyperosmolarity without coma, unspecified whether long term insulin use (H)  -not on medications.   -diet controlled.     HTN  With recent hypotension   -metoprolol discontinued during hospitalization.   -continue isosorbide hold for SBP < 95.   -GAEL hose Q day for bp support.     Acute kidney injury superimposed on CKD  (H24)  -Creatine 1.2-1.3.     Dysphagia, unspecified type  -FEES test recently. Liquids upgraded to thin.   -Continues on pureed diet.   -ST following.     Active Ambulatory Problems     Diagnosis Date Noted    Abnormal stress test 01/04/2024    Acute ischemic right posterior cerebral artery (PCA) stroke (H) 01/04/2024    Cerebrovascular accident (CVA) due to occlusion of left posterior cerebral artery (H) 07/04/2022    Type 2 diabetes mellitus (H) 02/16/2017    Tinnitus, bilateral 04/04/2022    Stage 3b chronic kidney disease (H) 03/13/2021    Onychomycosis of right great toe 04/05/2023    NAFLD (nonalcoholic fatty liver disease) 10/09/2019    Mixed dyslipidemia 05/05/2011    Elevated lipoprotein(a) 04/06/2022    Coronary artery disease of native artery of native heart with stable angina pectoris (H24) 01/04/2024    Visceral obesity 11/13/2020    Periodontitis 12/06/2023    History of CVA (cerebrovascular accident) 03/10/2024    Hypotension 03/10/2024     Resolved Ambulatory Problems     Diagnosis Date Noted    No Resolved Ambulatory Problems     No Additional Past Medical History     Allergies   Allergen Reactions    Latex Hives and Rash    Anesthetics, Lorna Rash       All Meds and Allergies reviewed in the record at the facility and is the most up-to-date.    Current Outpatient Medications   Medication Sig Dispense Refill    acetaminophen (TYLENOL) 325 MG tablet Take 650 mg by mouth 3 times daily      aspirin 81 MG EC  "tablet Take 81 mg by mouth daily      atorvastatin (LIPITOR) 80 MG tablet Take 80 mg by mouth at bedtime      clopidogrel (PLAVIX) 75 MG tablet Take 75 mg by mouth daily      escitalopram (LEXAPRO) 5 MG tablet Take 5 mg by mouth daily Start 5 mg PO daily X 1 week then increase to 10 mg daily.      gabapentin (NEURONTIN) 100 MG capsule Take 100 mg by mouth at bedtime      isosorbide dinitrate (ISORDIL) 5 MG tablet Take 1 tablet (5 mg) by mouth 2 times daily      QUEtiapine (SEROQUEL) 25 MG tablet Take 12.5 mg by mouth at bedtime      senna-docusate (SENOKOT-S/PERICOLACE) 8.6-50 MG tablet Take 1 tablet by mouth 2 times daily      tiZANidine (ZANAFLEX) 2 MG capsule Take 2 mg by mouth daily       No current facility-administered medications for this visit.       REVIEW OF SYSTEMS:   10 point review of systems reviewed and pertinent positives in the HPI.     PHYSICAL EXAMINATION:  Physical Exam     Vital signs: /61   Pulse 59   Temp 97.8  F (36.6  C)   Resp 16   Ht 1.778 m (5' 10\")   Wt 73.8 kg (162 lb 9.6 oz)   SpO2 93%   BMI 23.33 kg/m    General: Awake, Alert, oriented x3, sitting up in wheelchair,  appropriately, follows simple commands, conversant  HEENT:PERRLA, Pink conjunctiva, moist oral mucosa.   NECK: Supple  CVS:  S1  S2, without murmur or gallop.   LUNG: Clear to auscultation, No wheezes, rales or rhonci.  BACK: No kyphosis of the thoracic spine  ABDOMEN: Soft, nontender to palpation, with positive bowel sounds  EXTREMITIES: Left sided hemiparesis, no pedal edema, no calf tenderness. Able to overcome gravity LLE. Some movement in left hand.   SKIN: Warm and dry  NEUROLOGIC: Intact, pulses palpable  PSYCHIATRIC: Mood stable.       Labs:  All labs reviewed in the nursing home record and Epic   @  Lab Results   Component Value Date    WBC 8.2 03/07/2024     Lab Results   Component Value Date    RBC 3.80 03/07/2024     Lab Results   Component Value Date    HGB 12.1 03/07/2024     Lab Results "   Component Value Date    HCT 37.1 03/07/2024     Lab Results   Component Value Date    MCV 98 03/07/2024     Lab Results   Component Value Date    MCH 31.8 03/07/2024     Lab Results   Component Value Date    MCHC 32.6 03/07/2024     Lab Results   Component Value Date    RDW 13.7 03/07/2024     Lab Results   Component Value Date     03/07/2024        @Last Comprehensive Metabolic Panel:  Sodium   Date Value Ref Range Status   03/21/2024 140 135 - 145 mmol/L Final     Comment:     Reference intervals for this test were updated on 09/26/2023 to more accurately reflect our healthy population. There may be differences in the flagging of prior results with similar values performed with this method. Interpretation of those prior results can be made in the context of the updated reference intervals.      Potassium   Date Value Ref Range Status   03/21/2024 4.1 3.4 - 5.3 mmol/L Final     Chloride   Date Value Ref Range Status   03/21/2024 105 98 - 107 mmol/L Final     Carbon Dioxide (CO2)   Date Value Ref Range Status   03/21/2024 23 22 - 29 mmol/L Final     Anion Gap   Date Value Ref Range Status   03/21/2024 12 7 - 15 mmol/L Final     Glucose   Date Value Ref Range Status   03/21/2024 103 (H) 70 - 99 mg/dL Final     Urea Nitrogen   Date Value Ref Range Status   03/21/2024 13.9 8.0 - 23.0 mg/dL Final     Creatinine   Date Value Ref Range Status   03/21/2024 1.26 (H) 0.67 - 1.17 mg/dL Final     GFR Estimate   Date Value Ref Range Status   03/21/2024 60 (L) >60 mL/min/1.73m2 Final     Calcium   Date Value Ref Range Status   03/21/2024 9.0 8.8 - 10.2 mg/dL Final       This note has been dictated using voice recognition software. Any grammatical or context distortions are unintentional and inherent to the software    Electronically signed by: Essence Conteh CNP

## 2024-04-04 LAB
ANION GAP SERPL CALCULATED.3IONS-SCNC: 11 MMOL/L (ref 7–15)
BUN SERPL-MCNC: 14.3 MG/DL (ref 8–23)
CALCIUM SERPL-MCNC: 9 MG/DL (ref 8.8–10.2)
CHLORIDE SERPL-SCNC: 106 MMOL/L (ref 98–107)
CREAT SERPL-MCNC: 1.23 MG/DL (ref 0.67–1.17)
DEPRECATED HCO3 PLAS-SCNC: 25 MMOL/L (ref 22–29)
EGFRCR SERPLBLD CKD-EPI 2021: 62 ML/MIN/1.73M2
GLUCOSE SERPL-MCNC: 91 MG/DL (ref 70–99)
POTASSIUM SERPL-SCNC: 4.2 MMOL/L (ref 3.4–5.3)
SODIUM SERPL-SCNC: 142 MMOL/L (ref 135–145)

## 2024-04-04 PROCEDURE — P9604 ONE-WAY ALLOW PRORATED TRIP: HCPCS | Mod: ORL | Performed by: FAMILY MEDICINE

## 2024-04-04 PROCEDURE — 36415 COLL VENOUS BLD VENIPUNCTURE: CPT | Mod: ORL | Performed by: FAMILY MEDICINE

## 2024-04-04 PROCEDURE — 80048 BASIC METABOLIC PNL TOTAL CA: CPT | Mod: ORL | Performed by: FAMILY MEDICINE

## 2024-04-12 ENCOUNTER — TRANSITIONAL CARE UNIT VISIT (OUTPATIENT)
Dept: GERIATRICS | Facility: CLINIC | Age: 74
End: 2024-04-12
Payer: COMMERCIAL

## 2024-04-12 VITALS
TEMPERATURE: 97.6 F | DIASTOLIC BLOOD PRESSURE: 67 MMHG | HEART RATE: 60 BPM | SYSTOLIC BLOOD PRESSURE: 119 MMHG | WEIGHT: 161.2 LBS | RESPIRATION RATE: 16 BRPM | BODY MASS INDEX: 23.13 KG/M2 | OXYGEN SATURATION: 98 %

## 2024-04-12 DIAGNOSIS — I69.398 SPASTICITY AS LATE EFFECT OF CEREBROVASCULAR ACCIDENT (CVA): Primary | ICD-10-CM

## 2024-04-12 DIAGNOSIS — R25.2 SPASTICITY AS LATE EFFECT OF CEREBROVASCULAR ACCIDENT (CVA): Primary | ICD-10-CM

## 2024-04-12 DIAGNOSIS — Z86.73 HISTORY OF CVA (CEREBROVASCULAR ACCIDENT): ICD-10-CM

## 2024-04-12 DIAGNOSIS — I25.118 CORONARY ARTERY DISEASE OF NATIVE ARTERY OF NATIVE HEART WITH STABLE ANGINA PECTORIS (H): ICD-10-CM

## 2024-04-12 DIAGNOSIS — E78.2 MIXED DYSLIPIDEMIA: ICD-10-CM

## 2024-04-12 DIAGNOSIS — F33.1 MODERATE EPISODE OF RECURRENT MAJOR DEPRESSIVE DISORDER (H): ICD-10-CM

## 2024-04-12 DIAGNOSIS — I69.391 DYSPHAGIA AS LATE EFFECT OF CEREBROVASCULAR ACCIDENT (CVA): ICD-10-CM

## 2024-04-12 PROCEDURE — 99309 SBSQ NF CARE MODERATE MDM 30: CPT | Performed by: NURSE PRACTITIONER

## 2024-04-12 NOTE — PROGRESS NOTES
[facility]  ACUTE/EPISODIC VISIT    [facility] Medical Record Number:  [id number]  Place of Service where encounter took place:  [facility] ([facility]) [[id number]]    Chief Complaint   Patient presents with    RECHECK       HPI:    Shaji Markham is a 73 year old  (1950), who is being seen today for an episodic care visit.  HPI information obtained from: facility chart records, facility staff, patient report, and [facility] chart review.    Today's concern is:    Diagnoses         Codes Comments    Spasticity as late effect of cerebrovascular accident (CVA)    -  Primary I69.398, R25.2     History of CVA (cerebrovascular accident)     Z86.73     Dysphagia as late effect of cerebrovascular accident (CVA)     I69.391     Coronary artery disease of native artery of native heart with stable angina pectoris (H24)     I25.118     Mixed dyslipidemia     E78.2     Moderate episode of recurrent major depressive disorder (H)     F33.1           Came to see Vinny today to oversee his rehab process as he is here due to a hx of right ischemic posterior cerebral artery stroke affecting his left side.  Also has issues with Dysphagia as well.      Found Vinny in his room today sitting up in the wheelchair.  In no acute distress and interacting with this NP.    Reviewed nursing notes.  Appears that [facility] is working with [facility] to see about long term placement for Vinny if appropriate and having the right bed (male/female).      Today he does not complain of pain.  Wears a brace to left foot to assist with stability with ambulation that staff help and pull the wheelchair behind.      ALLERGIES:    Allergies   Allergen Reactions    Latex Hives and Rash    Anesthetics, Lorna Rash        MEDICATIONS:  Post Discharge Medication Reconciliation Status: medications reconciled.     Current Outpatient Medications   Medication Sig Dispense Refill    escitalopram (LEXAPRO) 20 MG tablet Take 1 tablet  (20 mg) by mouth daily      acetaminophen (TYLENOL) 325 MG tablet Take 650 mg by mouth 3 times daily      aspirin 81 MG EC tablet Take 81 mg by mouth daily      atorvastatin (LIPITOR) 80 MG tablet Take 80 mg by mouth at bedtime      clopidogrel (PLAVIX) 75 MG tablet Take 75 mg by mouth daily      gabapentin (NEURONTIN) 100 MG capsule Take 100 mg by mouth at bedtime      isosorbide dinitrate (ISORDIL) 5 MG tablet Take 1 tablet (5 mg) by mouth 2 times daily      QUEtiapine (SEROQUEL) 25 MG tablet Take 12.5 mg by mouth daily as needed      senna-docusate (SENOKOT-S/PERICOLACE) 8.6-50 MG tablet Take 1 tablet by mouth 2 times daily      tiZANidine (ZANAFLEX) 2 MG capsule Take 2 mg by mouth daily           REVIEW OF SYSTEMS:  4 point ROS neg other than the symptoms noted above in the HPI.      PHYSICAL EXAM:  /67   Pulse 60   Temp 97.6  F (36.4  C)   Resp 16   Wt 73.1 kg (161 lb 3.2 oz)   SpO2 98%   BMI 23.13 kg/m    Sitting up in wheelchair in room.  Alert and oriented to person and place.  Time is vague.  Skin is pink/pale, dry and warm to touch.    Heart rate/rhythm is regular and strong.  Brace on left foot.  No edema in right foot.  Left arm is flaccid.  Lungs are clear.  No use of oxygen.  Abdomen is round, soft, non-tender with bowel sounds present.  It is noted in charting he is incontinent.    A2 with transfers.  A1 with cares.    Component      Latest Ref Rng 4/4/2024  5:32 AM   Sodium      135 - 145 mmol/L 142    Potassium      3.4 - 5.3 mmol/L 4.2    Chloride      98 - 107 mmol/L 106    Carbon Dioxide (CO2)      22 - 29 mmol/L 25    Anion Gap      7 - 15 mmol/L 11    Urea Nitrogen      8.0 - 23.0 mg/dL 14.3    Creatinine      0.67 - 1.17 mg/dL 1.23 (H)    GFR Estimate      >60 mL/min/1.73m2 62    [id number]      8.8 - 10.2 mg/dL 9.0    Glucose      70 - 99 mg/dL 91       Lab Results   Component Value Date    HGB 11.1 03/21/2024       ASSESSMENT / PLAN:  (I69.398,  R25.2) Spasticity as late effect  of cerebrovascular accident (CVA)  (primary encounter diagnosis)  (Z86.73) History of CVA (cerebrovascular accident)  (I69.391) Dysphagia as late effect of cerebrovascular accident (CVA)  Comment: currently taking ASA 81mg daily and Plavix 75mg daily.  Takes Zanaflex 2mg every AM for spasticity.  Has Gabapentin 100mg at HS which can help with spasms.  Tylenol scheduled for pain control from the CVA.  Seems comfortable.  SW working on long term placement.    (I25.118) Coronary artery disease of native artery of native heart with stable angina pectoris (H24)  (E78.2) Mixed dyslipidemia  Comment: does take Isodil 5mg twice a day.  Takes Lipitor 80mg daily.  Overall B/P's are stable.  No complaints of heart palpitations.    (F33.1) Moderate episode of recurrent major depressive disorder (H)  Comment: was slowly started on Lexapro and finally now on 20mg daily since 3/20/24.  Mood seems appropriate.  Does not appear anxious or sad.  Cooperative with exam.  Continue same treatment.  Plan: escitalopram (LEXAPRO) 20 MG tablet,             Orders:  No new orders today    Electronically signed by  ALEXANDRA Mejia CNP

## 2024-04-12 NOTE — LETTER
4/12/2024        RE: Shaji Markham  [address] [place] [address]        [facility]  ACUTE/EPISODIC VISIT    [facility] Medical Record Number:  [id number]  Place of Service where encounter took place:  [facility] ([facility]) [[id number]]    Chief Complaint   Patient presents with     RECHECK       HPI:    Shaji Markham is a 73 year old  (1950), who is being seen today for an episodic care visit.  HPI information obtained from: facility chart records, facility staff, patient report, and [facility] chart review.    Today's concern is:    Diagnoses         Codes Comments    Spasticity as late effect of cerebrovascular accident (CVA)    -  Primary I69.398, R25.2     History of CVA (cerebrovascular accident)     Z86.73     Dysphagia as late effect of cerebrovascular accident (CVA)     I69.391     Coronary artery disease of native artery of native heart with stable angina pectoris (H24)     I25.118     Mixed dyslipidemia     E78.2     Moderate episode of recurrent major depressive disorder (H)     F33.1           Came to see Vinny today to oversee his rehab process as he is here due to a hx of right ischemic posterior cerebral artery stroke affecting his left side.  Also has issues with Dysphagia as well.      Found Vinny in his room today sitting up in the wheelchair.  In no acute distress and interacting with this NP.    Reviewed nursing notes.  Appears that [facility] is working with [facility] to see about long term placement for Vinny if appropriate and having the right bed (male/female).      Today he does not complain of pain.  Wears a brace to left foot to assist with stability with ambulation that staff help and pull the wheelchair behind.      ALLERGIES:    Allergies   Allergen Reactions     Latex Hives and Rash     Anesthetics, Lorna Rash        MEDICATIONS:  Post Discharge Medication Reconciliation Status: medications reconciled.     Current Outpatient  Medications   Medication Sig Dispense Refill     escitalopram (LEXAPRO) 20 MG tablet Take 1 tablet (20 mg) by mouth daily       acetaminophen (TYLENOL) 325 MG tablet Take 650 mg by mouth 3 times daily       aspirin 81 MG EC tablet Take 81 mg by mouth daily       atorvastatin (LIPITOR) 80 MG tablet Take 80 mg by mouth at bedtime       clopidogrel (PLAVIX) 75 MG tablet Take 75 mg by mouth daily       gabapentin (NEURONTIN) 100 MG capsule Take 100 mg by mouth at bedtime       isosorbide dinitrate (ISORDIL) 5 MG tablet Take 1 tablet (5 mg) by mouth 2 times daily       QUEtiapine (SEROQUEL) 25 MG tablet Take 12.5 mg by mouth daily as needed       senna-docusate (SENOKOT-S/PERICOLACE) 8.6-50 MG tablet Take 1 tablet by mouth 2 times daily       tiZANidine (ZANAFLEX) 2 MG capsule Take 2 mg by mouth daily           REVIEW OF SYSTEMS:  4 point ROS neg other than the symptoms noted above in the HPI.      PHYSICAL EXAM:  /67   Pulse 60   Temp 97.6  F (36.4  C)   Resp 16   Wt 73.1 kg (161 lb 3.2 oz)   SpO2 98%   BMI 23.13 kg/m    Sitting up in wheelchair in room.  Alert and oriented to person and place.  Time is vague.  Skin is pink/pale, dry and warm to touch.    Heart rate/rhythm is regular and strong.  Brace on left foot.  No edema in right foot.  Left arm is flaccid.  Lungs are clear.  No use of oxygen.  Abdomen is round, soft, non-tender with bowel sounds present.  It is noted in charting he is incontinent.    A2 with transfers.  A1 with cares.    Component      Latest Ref Rng 4/4/2024  5:32 AM   Sodium      135 - 145 mmol/L 142    Potassium      3.4 - 5.3 mmol/L 4.2    Chloride      98 - 107 mmol/L 106    Carbon Dioxide (CO2)      22 - 29 mmol/L 25    Anion Gap      7 - 15 mmol/L 11    Urea Nitrogen      8.0 - 23.0 mg/dL 14.3    Creatinine      0.67 - 1.17 mg/dL 1.23 (H)    GFR Estimate      >60 mL/min/1.73m2 62    [id number]      8.8 - 10.2 mg/dL 9.0    Glucose      70 - 99 mg/dL 91       Lab Results    Component Value Date    HGB 11.1 03/21/2024       ASSESSMENT / PLAN:  (I69.398,  R25.2) Spasticity as late effect of cerebrovascular accident (CVA)  (primary encounter diagnosis)  (Z86.73) History of CVA (cerebrovascular accident)  (I69.391) Dysphagia as late effect of cerebrovascular accident (CVA)  Comment: currently taking ASA 81mg daily and Plavix 75mg daily.  Takes Zanaflex 2mg every AM for spasticity.  Has Gabapentin 100mg at HS which can help with spasms.  Tylenol scheduled for pain control from the CVA.  Seems comfortable.  SW working on long term placement.    (I25.118) Coronary artery disease of native artery of native heart with stable angina pectoris (H24)  (E78.2) Mixed dyslipidemia  Comment: does take Isodil 5mg twice a day.  Takes Lipitor 80mg daily.  Overall B/P's are stable.  No complaints of heart palpitations.    (F33.1) Moderate episode of recurrent major depressive disorder (H)  Comment: was slowly started on Lexapro and finally now on 20mg daily since 3/20/24.  Mood seems appropriate.  Does not appear anxious or sad.  Cooperative with exam.  Continue same treatment.  Plan: escitalopram (LEXAPRO) 20 MG tablet,             Orders:  No new orders today    Electronically signed by  ALEXANDRA Mejia CNP            Sincerely,        ALEXANDRA Mejia CNP

## 2024-04-12 NOTE — LETTER
4/12/2024        RE: Shaji Markham  [address]  [facility] [address]        No notes on file      Sincerely,        ALEXANDRA Mejia CNP

## 2024-04-16 ENCOUNTER — TRANSITIONAL CARE UNIT VISIT (OUTPATIENT)
Dept: GERIATRICS | Facility: CLINIC | Age: 74
End: 2024-04-16
Payer: COMMERCIAL

## 2024-04-16 VITALS
HEIGHT: 70 IN | SYSTOLIC BLOOD PRESSURE: 116 MMHG | RESPIRATION RATE: 18 BRPM | OXYGEN SATURATION: 96 % | DIASTOLIC BLOOD PRESSURE: 58 MMHG | HEART RATE: 62 BPM | TEMPERATURE: 97.3 F | BODY MASS INDEX: 23.19 KG/M2 | WEIGHT: 162 LBS

## 2024-04-16 DIAGNOSIS — F34.1 PERSISTENT DEPRESSIVE DISORDER WITH ANXIOUS DISTRESS, CURRENTLY SEVERE: ICD-10-CM

## 2024-04-16 DIAGNOSIS — S80.212A KNEE ABRASION, LEFT, INITIAL ENCOUNTER: ICD-10-CM

## 2024-04-16 DIAGNOSIS — I63.531 ACUTE ISCHEMIC RIGHT POSTERIOR CEREBRAL ARTERY (PCA) STROKE (H): Primary | ICD-10-CM

## 2024-04-16 DIAGNOSIS — E11.00 TYPE 2 DIABETES MELLITUS WITH HYPEROSMOLARITY WITHOUT COMA, UNSPECIFIED WHETHER LONG TERM INSULIN USE (H): ICD-10-CM

## 2024-04-16 DIAGNOSIS — I25.118 CORONARY ARTERY DISEASE OF NATIVE ARTERY OF NATIVE HEART WITH STABLE ANGINA PECTORIS (H): ICD-10-CM

## 2024-04-16 PROCEDURE — 99309 SBSQ NF CARE MODERATE MDM 30: CPT | Performed by: NURSE PRACTITIONER

## 2024-04-16 NOTE — PROGRESS NOTES
[facility] GERIATRIC SERVICES    Code Status:  DNR   Visit Type:   Chief Complaint   Patient presents with    TCU Follow Up     Facility:  [facility] ([facility]) [[id number]]         HPI: Shaji Markham is a 73 year old male who I am seeing today for follow up on  the TCU. Pt recently hospitalized with hypotension. Past medical history includes CAD with recent CVA, depression with anxiety, DM2, CKD stage 3b. Pt in PT when he became dizzy, weak with blurred vision. BP low in the sbp 70s. Pt c/o chest pain and was given nitroglycerin. Pt given fluids and bp improved. Metoprolol discontinued during hospitalization. Pt continues on isosorbide. DM2 currently not on meds. CAD on clopidogrel, ASA and atorvastatin. Repeat CT showed subacute/chronic appearing infarction right parasagittal occipital lobe within the right PCA vascular territory which appeared to have undergone some temporal evolution from prior head CT. He was seen by ST during hospitalization and downgraded to pureed diet.     Transitional Care Course: Today pt sitting up in wheelchair. Yesterday pt went out into parking lot and rolled his wheelchair into a parked car. He did not fall out of his wheelchair however he did receive 2 abrasions to the left knee. Topical treatment applied. Pt denies any pain. Bruising noted. CVA with left side weakness. He is gaining strength in his LUE. Pt with depressed symptoms on admit. Mood is improving. Pleasant on exam. He continues on Lexapro. He is also on Seroquel with recent reduction in dose. Dysphagia on pureed diet.     Assessment/Plan:     Coronary artery disease of native artery of native heart with stable angina pectoris (H24)  Acute ischemic right posterior cerebral artery (PCA) stroke (H)  Spasticity  -Continue ASA, clopidogrel and statin.   -left side hemiparesis.   -continue isosorbide 5 mg BID. (Hold for SBP < 95.)   -continues tizanidine 2 mg every day.     Persistent depressive disorder with anxious  distress, currently severe  Agitation   -Lexapro 20 mg every day.   -Seroquel recently reduced to 12.5 mg Q HS - will attempt to wean off.     Type 2 diabetes mellitus with hyperosmolarity without coma, unspecified whether long term insulin use (H)  -not on medications.   -diet controlled.     HTN  With recent hypotension   -metoprolol discontinued during hospitalization.   -continue isosorbide hold for SBP < 95.   -GAEL hose Q day for bp support.     Acute kidney injury superimposed on CKD  (H24)  -Creatine 1.2-1.3.     Dysphagia, unspecified type  -FEES test recently. Liquids upgraded to thin.   -Continues on pureed diet.   -ST following.     Active Ambulatory Problems     Diagnosis Date Noted    Abnormal stress test 01/04/2024    Acute ischemic right posterior cerebral artery (PCA) stroke (H) 01/04/2024    Cerebrovascular accident (CVA) due to occlusion of left posterior cerebral artery (H) 07/04/2022    Type 2 diabetes mellitus (H) 02/16/2017    Tinnitus, bilateral 04/04/2022    Stage 3b chronic kidney disease (H) 03/13/2021    Onychomycosis of right great toe 04/05/2023    NAFLD (nonalcoholic fatty liver disease) 10/09/2019    Mixed dyslipidemia 05/05/2011    Elevated lipoprotein(a) 04/06/2022    Coronary artery disease of native artery of native heart with stable angina pectoris (H24) 01/04/2024    Visceral obesity 11/13/2020    Periodontitis 12/06/2023    History of CVA (cerebrovascular accident) 03/10/2024    Hypotension 03/10/2024     Resolved Ambulatory Problems     Diagnosis Date Noted    No Resolved Ambulatory Problems     No Additional Past Medical History     Allergies   Allergen Reactions    Latex Hives and Rash    Anesthetics, Lorna Rash       All Meds and Allergies reviewed in the record at the facility and is the most up-to-date.    Current Outpatient Medications   Medication Sig Dispense Refill    acetaminophen (TYLENOL) 325 MG tablet Take 650 mg by mouth 3 times daily      aspirin 81 MG EC tablet  "Take 81 mg by mouth daily      atorvastatin (LIPITOR) 80 MG tablet Take 80 mg by mouth at bedtime      clopidogrel (PLAVIX) 75 MG tablet Take 75 mg by mouth daily      escitalopram (LEXAPRO) 5 MG tablet Take 5 mg by mouth daily Start 5 mg PO daily X 1 week then increase to 10 mg daily.      gabapentin (NEURONTIN) 100 MG capsule Take 100 mg by mouth at bedtime      isosorbide dinitrate (ISORDIL) 5 MG tablet Take 1 tablet (5 mg) by mouth 2 times daily      QUEtiapine (SEROQUEL) 25 MG tablet Take 12.5 mg by mouth at bedtime      senna-docusate (SENOKOT-S/PERICOLACE) 8.6-50 MG tablet Take 1 tablet by mouth 2 times daily      tiZANidine (ZANAFLEX) 2 MG capsule Take 2 mg by mouth daily       No current facility-administered medications for this visit.       REVIEW OF SYSTEMS:   10 point review of systems reviewed and pertinent positives in the HPI.     PHYSICAL EXAMINATION:  Physical Exam     Vital signs: /58   Pulse 62   Temp 97.3  F (36.3  C)   Resp 18   Ht 1.778 m (5' 10\")   Wt 73.5 kg (162 lb)   SpO2 96%   BMI 23.24 kg/m    General: Awake, Alert, oriented x3, sitting up in wheelchair,  appropriately, follows simple commands, conversant  HEENT: Pink conjunctiva, moist oral mucosa.   NECK: Supple  CVS:  S1  S2, without murmur or gallop.   LUNG: Clear to auscultation, No wheezes, rales or rhonci.  BACK: No kyphosis of the thoracic spine  ABDOMEN: Soft, nontender to palpation, with positive bowel sounds  EXTREMITIES: Left sided hemiparesis, no pedal edema, no calf tenderness. Some movement in left hand.   SKIN: Abrasions to left knee with bruising surrounding.   NEUROLOGIC: Intact, pulses palpable  PSYCHIATRIC: Mood stable. Pleasant affect.       Labs:  All labs reviewed in the nursing home record and [email or web address]   @  Lab Results   Component Value Date    WBC 8.2 03/07/2024     Lab Results   Component Value Date    RBC 3.80 03/07/2024     Lab Results   Component Value Date    HGB 12.1 03/07/2024     Lab Results "   Component Value Date    HCT 37.1 03/07/2024     Lab Results   Component Value Date    MCV 98 03/07/2024     Lab Results   Component Value Date    MCH 31.8 03/07/2024     Lab Results   Component Value Date    MCHC 32.6 03/07/2024     Lab Results   Component Value Date    RDW 13.7 03/07/2024     Lab Results   Component Value Date     03/07/2024        @Last Comprehensive Metabolic Panel:  Sodium   Date Value Ref Range Status   04/04/2024 142 135 - 145 mmol/L Final     Comment:     Reference intervals for this test were updated on 09/26/2023 to more accurately reflect our healthy population. There may be differences in the flagging of prior results with similar values performed with this method. Interpretation of those prior results can be made in the context of the updated reference intervals.      Potassium   Date Value Ref Range Status   04/04/2024 4.2 3.4 - 5.3 mmol/L Final     Chloride   Date Value Ref Range Status   04/04/2024 106 98 - 107 mmol/L Final     Carbon Dioxide (CO2)   Date Value Ref Range Status   04/04/2024 25 22 - 29 mmol/L Final     Anion Gap   Date Value Ref Range Status   04/04/2024 11 7 - 15 mmol/L Final     Glucose   Date Value Ref Range Status   04/04/2024 91 70 - 99 mg/dL Final     Urea Nitrogen   Date Value Ref Range Status   04/04/2024 14.3 8.0 - 23.0 mg/dL Final     Creatinine   Date Value Ref Range Status   04/04/2024 1.23 (H) 0.67 - 1.17 mg/dL Final     GFR Estimate   Date Value Ref Range Status   04/04/2024 62 >60 mL/min/1.73m2 Final     Calcium   Date Value Ref Range Status   04/04/2024 9.0 8.8 - 10.2 mg/dL Final       This note has been dictated using voice recognition software. Any grammatical or context distortions are unintentional and inherent to the software    Electronically signed by: Essence Conteh CNP

## 2024-04-16 NOTE — LETTER
4/16/2024        RE: Shaji Markham  [address]   [facility] [address]        [facility]    Code Status:  DNR   Visit Type:   Chief Complaint   Patient presents with     TCU Follow Up     Facility:  [facility]) [[id number]]         HPI: Shaji Markham is a 73 year old male who I am seeing today for follow up on  the TCU. Pt recently hospitalized with hypotension. Past medical history includes CAD with recent CVA, depression with anxiety, DM2, CKD stage 3b. Pt in PT when he became dizzy, weak with blurred vision. BP low in the sbp 70s. Pt c/o chest pain and was given nitroglycerin. Pt given fluids and bp improved. Metoprolol discontinued during hospitalization. Pt continues on isosorbide. DM2 currently not on meds. CAD on clopidogrel, ASA and atorvastatin. Repeat CT showed subacute/chronic appearing infarction right parasagittal occipital lobe within the right PCA vascular territory which appeared to have undergone some temporal evolution from prior head CT. He was seen by ST during hospitalization and downgraded to pureed diet.     Transitional Care Course: Today pt sitting up in wheelchair. Yesterday pt went out into parking lot and rolled his wheelchair into a parked car. He did not fall out of his wheelchair however he did receive 2 abrasions to the left knee. Topical treatment applied. Pt denies any pain. Bruising noted. CVA with left side weakness. He is gaining strength in his LUE. Pt with depressed symptoms on admit. Mood is improving. Pleasant on exam. He continues on Lexapro. He is also on Seroquel with recent reduction in dose. Dysphagia on pureed diet.     Assessment/Plan:     Coronary artery disease of native artery of native heart with stable angina pectoris (H24)  Acute ischemic right posterior cerebral artery (PCA) stroke (H)  Spasticity  -Continue ASA, clopidogrel and statin.   -left side hemiparesis.   -continue isosorbide 5 mg BID. (Hold for SBP < 95.)    -continues tizanidine 2 mg every day.     Persistent depressive disorder with anxious distress, currently severe  Agitation   -Lexapro 20 mg every day.   -Seroquel recently reduced to 12.5 mg Q HS - will attempt to wean off.     Type 2 diabetes mellitus with hyperosmolarity without coma, unspecified whether long term insulin use (H)  -not on medications.   -diet controlled.     HTN  With recent hypotension   -metoprolol discontinued during hospitalization.   -continue isosorbide hold for SBP < 95.   -GAEL hose Q day for bp support.     Acute kidney injury superimposed on CKD  (H24)  -Creatine 1.2-1.3.     Dysphagia, unspecified type  -FEES test recently. Liquids upgraded to thin.   -Continues on pureed diet.   -ST following.     Active Ambulatory Problems     Diagnosis Date Noted     Abnormal stress test 01/04/2024     Acute ischemic right posterior cerebral artery (PCA) stroke (H) 01/04/2024     Cerebrovascular accident (CVA) due to occlusion of left posterior cerebral artery (H) 07/04/2022     Type 2 diabetes mellitus (H) 02/16/2017     Tinnitus, bilateral 04/04/2022     Stage 3b chronic kidney disease (H) 03/13/2021     Onychomycosis of right great toe 04/05/2023     NAFLD (nonalcoholic fatty liver disease) 10/09/2019     Mixed dyslipidemia 05/05/2011     Elevated lipoprotein(a) 04/06/2022     Coronary artery disease of native artery of native heart with stable angina pectoris (H24) 01/04/2024     Visceral obesity 11/13/2020     Periodontitis 12/06/2023     History of CVA (cerebrovascular accident) 03/10/2024     Hypotension 03/10/2024     Resolved Ambulatory Problems     Diagnosis Date Noted     No Resolved Ambulatory Problems     No Additional Past Medical History     Allergies   Allergen Reactions     Latex Hives and Rash     Anesthetics, Lorna Rash       All Meds and Allergies reviewed in the record at the facility and is the most up-to-date.    Current Outpatient Medications   Medication Sig Dispense Refill  "    acetaminophen (TYLENOL) 325 MG tablet Take 650 mg by mouth 3 times daily       aspirin 81 MG EC tablet Take 81 mg by mouth daily       atorvastatin (LIPITOR) 80 MG tablet Take 80 mg by mouth at bedtime       clopidogrel (PLAVIX) 75 MG tablet Take 75 mg by mouth daily       escitalopram (LEXAPRO) 5 MG tablet Take 5 mg by mouth daily Start 5 mg PO daily X 1 week then increase to 10 mg daily.       gabapentin (NEURONTIN) 100 MG capsule Take 100 mg by mouth at bedtime       isosorbide dinitrate (ISORDIL) 5 MG tablet Take 1 tablet (5 mg) by mouth 2 times daily       QUEtiapine (SEROQUEL) 25 MG tablet Take 12.5 mg by mouth at bedtime       senna-docusate (SENOKOT-S/PERICOLACE) 8.6-50 MG tablet Take 1 tablet by mouth 2 times daily       tiZANidine (ZANAFLEX) 2 MG capsule Take 2 mg by mouth daily       No current facility-administered medications for this visit.       REVIEW OF SYSTEMS:   10 point review of systems reviewed and pertinent positives in the HPI.     PHYSICAL EXAMINATION:  Physical Exam     Vital signs: /58   Pulse 62   Temp 97.3  F (36.3  C)   Resp 18   Ht 1.778 m (5' 10\")   Wt 73.5 kg (162 lb)   SpO2 96%   BMI 23.24 kg/m    General: Awake, Alert, oriented x3, sitting up in wheelchair,  appropriately, follows simple commands, conversant  HEENT: Pink conjunctiva, moist oral mucosa.   NECK: Supple  CVS:  S1  S2, without murmur or gallop.   LUNG: Clear to auscultation, No wheezes, rales or rhonci.  BACK: No kyphosis of the thoracic spine  ABDOMEN: Soft, nontender to palpation, with positive bowel sounds  EXTREMITIES: Left sided hemiparesis, no pedal edema, no calf tenderness. Some movement in left hand.   SKIN: Abrasions to left knee with bruising surrounding.   NEUROLOGIC: Intact, pulses palpable  PSYCHIATRIC: Mood stable. Pleasant affect.       Labs:  All labs reviewed in the nursing home record and [facility]   @  Lab Results   Component Value Date    WBC 8.2 03/07/2024     Lab Results   Component " Value Date    RBC 3.80 03/07/2024     Lab Results   Component Value Date    HGB 12.1 03/07/2024     Lab Results   Component Value Date    HCT 37.1 03/07/2024     Lab Results   Component Value Date    MCV 98 03/07/2024     Lab Results   Component Value Date    MCH 31.8 03/07/2024     Lab Results   Component Value Date    MCHC 32.6 03/07/2024     Lab Results   Component Value Date    RDW 13.7 03/07/2024     Lab Results   Component Value Date     03/07/2024        @Last Comprehensive Metabolic Panel:  Sodium   Date Value Ref Range Status   04/04/2024 142 135 - 145 mmol/L Final     Comment:     Reference intervals for this test were updated on 09/26/2023 to more accurately reflect our healthy population. There may be differences in the flagging of prior results with similar values performed with this method. Interpretation of those prior results can be made in the context of the updated reference intervals.      Potassium   Date Value Ref Range Status   04/04/2024 4.2 3.4 - 5.3 mmol/L Final     Chloride   Date Value Ref Range Status   04/04/2024 106 98 - 107 mmol/L Final     Carbon Dioxide (CO2)   Date Value Ref Range Status   04/04/2024 25 22 - 29 mmol/L Final     Anion Gap   Date Value Ref Range Status   04/04/2024 11 7 - 15 mmol/L Final     Glucose   Date Value Ref Range Status   04/04/2024 91 70 - 99 mg/dL Final     Urea Nitrogen   Date Value Ref Range Status   04/04/2024 14.3 8.0 - 23.0 mg/dL Final     Creatinine   Date Value Ref Range Status   04/04/2024 1.23 (H) 0.67 - 1.17 mg/dL Final     GFR Estimate   Date Value Ref Range Status   04/04/2024 62 >60 mL/min/1.73m2 Final     Calcium   Date Value Ref Range Status   04/04/2024 9.0 8.8 - 10.2 mg/dL Final       This note has been dictated using voice recognition software. Any grammatical or context distortions are unintentional and inherent to the software    Electronically signed by: Essence Conteh CNP       Sincerely,        Essence Conteh NP

## 2024-04-23 ENCOUNTER — DISCHARGE SUMMARY NURSING HOME (OUTPATIENT)
Dept: GERIATRICS | Facility: CLINIC | Age: 74
End: 2024-04-23
Payer: COMMERCIAL

## 2024-04-23 VITALS
BODY MASS INDEX: 22.99 KG/M2 | DIASTOLIC BLOOD PRESSURE: 61 MMHG | WEIGHT: 160.6 LBS | TEMPERATURE: 97.6 F | HEIGHT: 70 IN | HEART RATE: 61 BPM | OXYGEN SATURATION: 97 % | SYSTOLIC BLOOD PRESSURE: 125 MMHG | RESPIRATION RATE: 20 BRPM

## 2024-04-23 PROCEDURE — 99315 NF DSCHRG MGMT 30 MIN/LESS: CPT | Performed by: NURSE PRACTITIONER

## 2024-04-23 NOTE — LETTER
4/23/2024        RE: Shaji Markham  [address]  [facility] [address]        [facility]    Code Status:  DNR   Visit Type:   Chief Complaint   Patient presents with     TCU Discharge     Facility:  [facility] ([facility]) [[id number]]         HPI: Shaji Markham is a 73 year old male who I am seeing today for discharge from the TCU to [facility]. Pt recently hospitalized with hypotension. Past medical history includes CAD with recent CVA, depression with anxiety, DM2, CKD stage 3b. Pt in PT when he became dizzy, weak with blurred vision. BP low in the sbp 70s. Pt c/o chest pain and was given nitroglycerin. Pt given fluids and bp improved. Metoprolol discontinued during hospitalization. Pt continues on isosorbide. DM2 currently not on meds. CAD on clopidogrel, ASA and atorvastatin. Repeat CT showed subacute/chronic appearing infarction right parasagittal occipital lobe within the right PCA vascular territory which appeared to have undergone some temporal evolution from prior head CT. He was seen by ST during hospitalization and downgraded to pureed diet.     Transitional Care Course: Today pt lying in bed. Recent acute CVA with left sided weakness. Pt is gaining strength greater in LLE than LUE. Pt continues on tizanidine for spasticity. Neuropathic pain improved with gabapentin. Pt with depressive symptoms due to recent events. He was started on Lexapro in the TCU. Mood/Behaviors improving. He was placed on Seroquel initially due to aggressive behaviors. Seroquel has been reduced to one daily. Will attempt to wean off. Dysphagia on pureed diet.     Assessment/Plan:     Coronary artery disease of native artery of native heart with stable angina pectoris (H24)  Acute ischemic right posterior cerebral artery (PCA) stroke (H)  Spasticity  -Continue ASA, clopidogrel and statin.   -left side hemiparesis.   -continue isosorbide 5 mg BID. (Hold for SBP < 95.)   -continues  tizanidine 2 mg every day.     Persistent depressive disorder with anxious distress, currently severe  Agitation   -Lexapro 20 mg every day.   -Change Seroquel to 12.5 mg every day prn. If no use in 2 weeks then discontinue.     Type 2 diabetes mellitus with hyperosmolarity without coma, unspecified whether long term insulin use (H)  -not on medications.   -diet controlled.     HTN  With recent hypotension   -metoprolol discontinued during hospitalization.   -continue isosorbide hold for SBP < 95.   -GAEL hose Q day for bp support.     Acute kidney injury superimposed on CKD  (H24)  -Creatine 1.2-1.3.     Dysphagia, unspecified type  -FEES test recently. Liquids upgraded to thin.   -Continues on pureed diet.   -ST following.     -Ok to discharge to LTC with current meds and treatment.     Active Ambulatory Problems     Diagnosis Date Noted     Abnormal stress test 01/04/2024     Acute ischemic right posterior cerebral artery (PCA) stroke (H) 01/04/2024     Cerebrovascular accident (CVA) due to occlusion of left posterior cerebral artery (H) 07/04/2022     Type 2 diabetes mellitus (H) 02/16/2017     Tinnitus, bilateral 04/04/2022     Stage 3b chronic kidney disease (H) 03/13/2021     Onychomycosis of right great toe 04/05/2023     NAFLD (nonalcoholic fatty liver disease) 10/09/2019     Mixed dyslipidemia 05/05/2011     Elevated lipoprotein(a) 04/06/2022     Coronary artery disease of native artery of native heart with stable angina pectoris (H24) 01/04/2024     Visceral obesity 11/13/2020     Periodontitis 12/06/2023     History of CVA (cerebrovascular accident) 03/10/2024     Hypotension 03/10/2024     Resolved Ambulatory Problems     Diagnosis Date Noted     No Resolved Ambulatory Problems     No Additional Past Medical History     Allergies   Allergen Reactions     Latex Hives and Rash     Anesthetics, Lorna Rash       All Meds and Allergies reviewed in the record at the facility and is the most  "up-to-date.    Current Outpatient Medications   Medication Sig Dispense Refill     acetaminophen (TYLENOL) 325 MG tablet Take 650 mg by mouth 3 times daily       aspirin 81 MG EC tablet Take 81 mg by mouth daily       atorvastatin (LIPITOR) 80 MG tablet Take 80 mg by mouth at bedtime       clopidogrel (PLAVIX) 75 MG tablet Take 75 mg by mouth daily       escitalopram (LEXAPRO) 5 MG tablet Take 5 mg by mouth daily Start 5 mg PO daily X 1 week then increase to 10 mg daily.       gabapentin (NEURONTIN) 100 MG capsule Take 100 mg by mouth at bedtime       isosorbide dinitrate (ISORDIL) 5 MG tablet Take 1 tablet (5 mg) by mouth 2 times daily       QUEtiapine (SEROQUEL) 25 MG tablet Take 12.5 mg by mouth daily as needed       senna-docusate (SENOKOT-S/PERICOLACE) 8.6-50 MG tablet Take 1 tablet by mouth 2 times daily       tiZANidine (ZANAFLEX) 2 MG capsule Take 2 mg by mouth daily       No current facility-administered medications for this visit.       REVIEW OF SYSTEMS:   10 point review of systems reviewed and pertinent positives in the HPI.     PHYSICAL EXAMINATION:  Physical Exam     Vital signs: /61   Pulse 61   Temp 97.6  F (36.4  C)   Resp 20   Ht 1.778 m (5' 10\")   Wt 72.8 kg (160 lb 9.6 oz)   SpO2 97%   BMI 23.04 kg/m    General: Awake, Alert, oriented x3, lying in bed,  appropriately, follows simple commands, conversant  HEENT: Pink conjunctiva, moist oral mucosa.   NECK: Supple  CVS:  S1  S2, without murmur or gallop.   LUNG: Clear to auscultation, No wheezes, rales or rhonci.  BACK: No kyphosis of the thoracic spine  ABDOMEN: Soft, nontender to palpation, with positive bowel sounds  EXTREMITIES: Left sided hemiparesis, no pedal edema, no calf tenderness. Some movement in left hand. Some movement to LLE.   SKIN: Abrasions to left knee with bruising surrounding.   NEUROLOGIC: Intact, pulses palpable  PSYCHIATRIC: Mood stable. Pleasant affect.       Labs:  All labs reviewed in the nursing home record " and [employer]   @  Lab Results   Component Value Date    WBC 8.2 03/07/2024     Lab Results   Component Value Date    RBC 3.80 03/07/2024     Lab Results   Component Value Date    HGB 12.1 03/07/2024     Lab Results   Component Value Date    HCT 37.1 03/07/2024     Lab Results   Component Value Date    MCV 98 03/07/2024     Lab Results   Component Value Date    MCH 31.8 03/07/2024     Lab Results   Component Value Date    MCHC 32.6 03/07/2024     Lab Results   Component Value Date    RDW 13.7 03/07/2024     Lab Results   Component Value Date     03/07/2024        @Last Comprehensive Metabolic Panel:  Sodium   Date Value Ref Range Status   04/04/2024 142 135 - 145 mmol/L Final     Comment:     Reference intervals for this test were updated on 09/26/2023 to more accurately reflect our healthy population. There may be differences in the flagging of prior results with similar values performed with this method. Interpretation of those prior results can be made in the context of the updated reference intervals.      Potassium   Date Value Ref Range Status   04/04/2024 4.2 3.4 - 5.3 mmol/L Final     Chloride   Date Value Ref Range Status   04/04/2024 106 98 - 107 mmol/L Final     Carbon Dioxide (CO2)   Date Value Ref Range Status   04/04/2024 25 22 - 29 mmol/L Final     Anion Gap   Date Value Ref Range Status   04/04/2024 11 7 - 15 mmol/L Final     Glucose   Date Value Ref Range Status   04/04/2024 91 70 - 99 mg/dL Final     Urea Nitrogen   Date Value Ref Range Status   04/04/2024 14.3 8.0 - 23.0 mg/dL Final     Creatinine   Date Value Ref Range Status   04/04/2024 1.23 (H) 0.67 - 1.17 mg/dL Final     GFR Estimate   Date Value Ref Range Status   04/04/2024 62 >60 mL/min/1.73m2 Final     Calcium   Date Value Ref Range Status   04/04/2024 9.0 8.8 - 10.2 mg/dL Final       This note has been dictated using voice recognition software. Any grammatical or context distortions are unintentional and inherent to the  software    Electronically signed by: Essence Conteh CNP       Sincerely,        Essence Conteh, NP

## 2024-04-23 NOTE — PROGRESS NOTES
[facility] GERIATRIC SERVICES    Code Status:  DNR   Visit Type:   Chief Complaint   Patient presents with    TCU Discharge     Facility:  [facility] ([facility]) [[id number]]         HPI: Shaji Markham is a 73 year old male who I am seeing today for discharge from the TCU to [facility]. Pt recently hospitalized with hypotension. Past medical history includes CAD with recent CVA, depression with anxiety, DM2, CKD stage 3b. Pt in PT when he became dizzy, weak with blurred vision. BP low in the sbp 70s. Pt c/o chest pain and was given nitroglycerin. Pt given fluids and bp improved. Metoprolol discontinued during hospitalization. Pt continues on isosorbide. DM2 currently not on meds. CAD on clopidogrel, ASA and atorvastatin. Repeat CT showed subacute/chronic appearing infarction right parasagittal occipital lobe within the right PCA vascular territory which appeared to have undergone some temporal evolution from prior head CT. He was seen by ST during hospitalization and downgraded to pureed diet.     Transitional Care Course: Today pt lying in bed. Recent acute CVA with left sided weakness. Pt is gaining strength greater in LLE than LUE. Pt continues on tizanidine for spasticity. Neuropathic pain improved with gabapentin. Pt with depressive symptoms due to recent events. He was started on Lexapro in the TCU. Mood/Behaviors improving. He was placed on Seroquel initially due to aggressive behaviors. Seroquel has been reduced to one daily. Will attempt to wean off. Dysphagia on pureed diet.     Assessment/Plan:     Coronary artery disease of native artery of native heart with stable angina pectoris (H24)  Acute ischemic right posterior cerebral artery (PCA) stroke (H)  Spasticity  -Continue ASA, clopidogrel and statin.   -left side hemiparesis.   -continue isosorbide 5 mg BID. (Hold for SBP < 95.)   -continues tizanidine 2 mg every day.     Persistent depressive disorder with anxious distress,  currently severe  Agitation   -Lexapro 20 mg every day.   -Change Seroquel to 12.5 mg every day prn. If no use in 2 weeks then discontinue.     Type 2 diabetes mellitus with hyperosmolarity without coma, unspecified whether long term insulin use (H)  -not on medications.   -diet controlled.     HTN  With recent hypotension   -metoprolol discontinued during hospitalization.   -continue isosorbide hold for SBP < 95.   -GAEL hose Q day for bp support.     Acute kidney injury superimposed on CKD  (H24)  -Creatine 1.2-1.3.     Dysphagia, unspecified type  -FEES test recently. Liquids upgraded to thin.   -Continues on pureed diet.   -ST following.     -Ok to discharge to LTC with current meds and treatment.     Active Ambulatory Problems     Diagnosis Date Noted    Abnormal stress test 01/04/2024    Acute ischemic right posterior cerebral artery (PCA) stroke (H) 01/04/2024    Cerebrovascular accident (CVA) due to occlusion of left posterior cerebral artery (H) 07/04/2022    Type 2 diabetes mellitus (H) 02/16/2017    Tinnitus, bilateral 04/04/2022    Stage 3b chronic kidney disease (H) 03/13/2021    Onychomycosis of right great toe 04/05/2023    NAFLD (nonalcoholic fatty liver disease) 10/09/2019    Mixed dyslipidemia 05/05/2011    Elevated lipoprotein(a) 04/06/2022    Coronary artery disease of native artery of native heart with stable angina pectoris (H24) 01/04/2024    Visceral obesity 11/13/2020    Periodontitis 12/06/2023    History of CVA (cerebrovascular accident) 03/10/2024    Hypotension 03/10/2024     Resolved Ambulatory Problems     Diagnosis Date Noted    No Resolved Ambulatory Problems     No Additional Past Medical History     Allergies   Allergen Reactions    Latex Hives and Rash    Anesthetics, Lorna Rash       All Meds and Allergies reviewed in the record at the facility and is the most up-to-date.    Current Outpatient Medications   Medication Sig Dispense Refill    acetaminophen (TYLENOL) 325 MG tablet  "Take 650 mg by mouth 3 times daily      aspirin 81 MG EC tablet Take 81 mg by mouth daily      atorvastatin (LIPITOR) 80 MG tablet Take 80 mg by mouth at bedtime      clopidogrel (PLAVIX) 75 MG tablet Take 75 mg by mouth daily      escitalopram (LEXAPRO) 5 MG tablet Take 5 mg by mouth daily Start 5 mg PO daily X 1 week then increase to 10 mg daily.      gabapentin (NEURONTIN) 100 MG capsule Take 100 mg by mouth at bedtime      isosorbide dinitrate (ISORDIL) 5 MG tablet Take 1 tablet (5 mg) by mouth 2 times daily      QUEtiapine (SEROQUEL) 25 MG tablet Take 12.5 mg by mouth daily as needed      senna-docusate (SENOKOT-S/PERICOLACE) 8.6-50 MG tablet Take 1 tablet by mouth 2 times daily      tiZANidine (ZANAFLEX) 2 MG capsule Take 2 mg by mouth daily       No current facility-administered medications for this visit.       REVIEW OF SYSTEMS:   10 point review of systems reviewed and pertinent positives in the HPI.     PHYSICAL EXAMINATION:  Physical Exam     Vital signs: /61   Pulse 61   Temp 97.6  F (36.4  C)   Resp 20   Ht 1.778 m (5' 10\")   Wt 72.8 kg (160 lb 9.6 oz)   SpO2 97%   BMI 23.04 kg/m    General: Awake, Alert, oriented x3, lying in bed,  appropriately, follows simple commands, conversant  HEENT: Pink conjunctiva, moist oral mucosa.   NECK: Supple  CVS:  S1  S2, without murmur or gallop.   LUNG: Clear to auscultation, No wheezes, rales or rhonci.  BACK: No kyphosis of the thoracic spine  ABDOMEN: Soft, nontender to palpation, with positive bowel sounds  EXTREMITIES: Left sided hemiparesis, no pedal edema, no calf tenderness. Some movement in left hand. Some movement to LLE.   SKIN: Abrasions to left knee with bruising surrounding.   NEUROLOGIC: Intact, pulses palpable  PSYCHIATRIC: Mood stable. Pleasant affect.       Labs:  All labs reviewed in the nursing home record and [facility]   @  Lab Results   Component Value Date    WBC 8.2 03/07/2024     Lab Results   Component Value Date    RBC 3.80 " 03/07/2024     Lab Results   Component Value Date    HGB 12.1 03/07/2024     Lab Results   Component Value Date    HCT 37.1 03/07/2024     Lab Results   Component Value Date    MCV 98 03/07/2024     Lab Results   Component Value Date    MCH 31.8 03/07/2024     Lab Results   Component Value Date    MCHC 32.6 03/07/2024     Lab Results   Component Value Date    RDW 13.7 03/07/2024     Lab Results   Component Value Date     03/07/2024        @Last Comprehensive Metabolic Panel:  Sodium   Date Value Ref Range Status   04/04/2024 142 135 - 145 mmol/L Final     Comment:     Reference intervals for this test were updated on 09/26/2023 to more accurately reflect our healthy population. There may be differences in the flagging of prior results with similar values performed with this method. Interpretation of those prior results can be made in the context of the updated reference intervals.      Potassium   Date Value Ref Range Status   04/04/2024 4.2 3.4 - 5.3 mmol/L Final     Chloride   Date Value Ref Range Status   04/04/2024 106 98 - 107 mmol/L Final     Carbon Dioxide (CO2)   Date Value Ref Range Status   04/04/2024 25 22 - 29 mmol/L Final     Anion Gap   Date Value Ref Range Status   04/04/2024 11 7 - 15 mmol/L Final     Glucose   Date Value Ref Range Status   04/04/2024 91 70 - 99 mg/dL Final     Urea Nitrogen   Date Value Ref Range Status   04/04/2024 14.3 8.0 - 23.0 mg/dL Final     Creatinine   Date Value Ref Range Status   04/04/2024 1.23 (H) 0.67 - 1.17 mg/dL Final     GFR Estimate   Date Value Ref Range Status   04/04/2024 62 >60 mL/min/1.73m2 Final     Calcium   Date Value Ref Range Status   04/04/2024 9.0 8.8 - 10.2 mg/dL Final       This note has been dictated using voice recognition software. Any grammatical or context distortions are unintentional and inherent to the software    Electronically signed by: Essence Conteh CNP

## 2024-04-24 ENCOUNTER — DOCUMENTATION ONLY (OUTPATIENT)
Dept: GERIATRICS | Facility: CLINIC | Age: 74
End: 2024-04-24
Payer: COMMERCIAL

## 2024-05-08 ENCOUNTER — NURSING HOME VISIT (OUTPATIENT)
Dept: GERIATRICS | Facility: CLINIC | Age: 74
End: 2024-05-08
Payer: COMMERCIAL

## 2024-05-08 VITALS
HEIGHT: 70 IN | TEMPERATURE: 98 F | DIASTOLIC BLOOD PRESSURE: 75 MMHG | SYSTOLIC BLOOD PRESSURE: 144 MMHG | HEART RATE: 59 BPM | RESPIRATION RATE: 18 BRPM | BODY MASS INDEX: 22.9 KG/M2 | WEIGHT: 160 LBS | OXYGEN SATURATION: 96 %

## 2024-05-08 DIAGNOSIS — I69.398 SPASTICITY AS LATE EFFECT OF CEREBROVASCULAR ACCIDENT (CVA): ICD-10-CM

## 2024-05-08 DIAGNOSIS — E11.00 TYPE 2 DIABETES MELLITUS WITH HYPEROSMOLARITY WITHOUT COMA, UNSPECIFIED WHETHER LONG TERM INSULIN USE (H): ICD-10-CM

## 2024-05-08 DIAGNOSIS — N18.32 STAGE 3B CHRONIC KIDNEY DISEASE (H): ICD-10-CM

## 2024-05-08 DIAGNOSIS — R25.2 SPASTICITY AS LATE EFFECT OF CEREBROVASCULAR ACCIDENT (CVA): ICD-10-CM

## 2024-05-08 DIAGNOSIS — F34.1 PERSISTENT DEPRESSIVE DISORDER WITH ANXIOUS DISTRESS, CURRENTLY SEVERE: ICD-10-CM

## 2024-05-08 DIAGNOSIS — I63.531 ACUTE ISCHEMIC RIGHT POSTERIOR CEREBRAL ARTERY (PCA) STROKE (H): Primary | ICD-10-CM

## 2024-05-08 PROCEDURE — 99309 SBSQ NF CARE MODERATE MDM 30: CPT | Performed by: FAMILY MEDICINE

## 2024-05-08 RX ORDER — ESCITALOPRAM OXALATE 20 MG/1
20 TABLET ORAL DAILY
Status: SHIPPED
Start: 2024-04-29 | End: 2024-05-08

## 2024-05-08 RX ORDER — ESCITALOPRAM OXALATE 20 MG/1
20 TABLET ORAL DAILY
Status: SHIPPED
Start: 2024-03-20

## 2024-05-08 NOTE — PROGRESS NOTES
[facility] GERIATRIC SERVICES       Patient Shaji Markham  MRN: [id number]        Reason for Visit     Chief Complaint   Patient presents with    senior living Regulatory       Code Status     DNR only    Assessment     Multivessel CAD-patient is felt to be a poor surgical candidate  Currently on medical management  Continue with aspirin Isordil as well as a statin  Ability to tolerate aggressive treatment limited by hypotension  Follow-up with cardiology as scheduled    Prior history of CVA post angiogram-right posterior cerebral artery  Etiology felt to be periprocedural  He is on aspirin Plavix and statins  Continue Zanaflex due to ongoing concerns about muscle spasms he is also on gabapentin and Tylenol    Hyperlipidemia continue with his statins    Severe bilateral carotid artery disease greater than 70%  Outpatient follow-up with vascular surgery recommended for him-patient has been told to delay his surgery at least for 3 months and continue with therapy he does have severe disease.  He told me he plans to seek a second opinion    Hypertension complicated by low blood pressures  He is no longer on any antihypertensives    Diabetes type 2  Appears to be diet controlled plan would be to check another A1c  Last creatinine was 6.8    CKD 3b-recheck BMP as ordered  Last creatinine stable at 1.2    Prior history of tobacco use    Mood disorder/adjustment reaction  Was followed by psychiatry  Reports mood is improved however still having difficulty adjusting his daughter is working at finding appropriate placement.  Continue Lexapro.  On a low-dose of Seroquel    Dysphagia apparently he has been upgraded to thin liquids.  Reporting that he is swallowing much better      Generalized weakness  At baseline due to decline he has become wheelchair-bound he has a history of recurrent falls        History     Patient is a very pleasant 73 year old male who is admitted to long-term care  Patient was initially admitted to the  hospital with multivessel CAD  He has a history of CVA post angiogram with resultant left-sided weakness  Ignacio to be a poor surgical candidate and discharged on medical management  Course complicated by hypotension  Was in the TCU and now moved to long-term care      Past Medical & Surgical History   Diabetes  Multivessel CAD  CKD      Past Social History     Reviewed,  has a past history of smoking denies alcohol use    Family History     Reviewed, and family history is not on file.    Medication List     Current Outpatient Medications   Medication Sig Dispense Refill    acetaminophen (TYLENOL) 325 MG tablet Take 650 mg by mouth 3 times daily      aspirin 81 MG EC tablet Take 81 mg by mouth daily      atorvastatin (LIPITOR) 80 MG tablet Take 80 mg by mouth at bedtime      clopidogrel (PLAVIX) 75 MG tablet Take 75 mg by mouth daily      gabapentin (NEURONTIN) 100 MG capsule Take 100 mg by mouth at bedtime      isosorbide dinitrate (ISORDIL) 5 MG tablet Take 1 tablet (5 mg) by mouth 2 times daily      QUEtiapine (SEROQUEL) 25 MG tablet Take 12.5 mg by mouth daily as needed      senna-docusate (SENOKOT-S/PERICOLACE) 8.6-50 MG tablet Take 1 tablet by mouth 2 times daily      tiZANidine (ZANAFLEX) 2 MG capsule Take 2 mg by mouth daily      escitalopram (LEXAPRO) 20 MG tablet Take 1 tablet (20 mg) by mouth daily       No current facility-administered medications for this visit.      MED REC REQUIRED  Post Medication Reconciliation Status: discharge medications reconciled, continue medications without change       Allergies     Allergies   Allergen Reactions    Latex Hives and Rash    Anesthetics, Lorna Rash       Review of Systems   A comprehensive review of 14 systems was done. Pertinent findings noted here and in history of present illness. All the rest negative.  Constitutional: Negative.  Negative for fever, chills, he has  activity change, appetite change and fatigue.  Dysphagia as improved   HENT: Negative for  "congestion and facial swelling.    Eyes: Negative for photophobia, redness and visual disturbance.   Respiratory: Negative for cough and chest tightness.    Cardiovascular: Negative for chest pain, palpitations and leg swelling.   Gastrointestinal: Negative for nausea, diarrhea, constipation, blood in stool and abdominal distention.   Genitourinary: Negative.    Musculoskeletal: Reporting multiple falls  Has difficulty walking and using a wheelchair but walking in the parallel bars has an AFO due to foot drop  Skin: Negative.    Neurological: Negative for dizziness, tremors, syncope, weakness, light-headedness and headaches.  Reporting muscle spasms  Left-sided hemiparesis  Hematological: Does not bruise/bleed easily.   Psychiatric/Behavioral: Negative.  Mood is improved per patient      Physical Exam   BP (!) 144/75   Pulse 59   Temp 98  F (36.7  C)   Resp 18   Ht 1.778 m (5' 10\")   Wt 72.6 kg (160 lb)   SpO2 96%   BMI 22.96 kg/m       Constitutional: Oriented to person, place, and time and appears well-developed.   HEENT:  Normocephalic and atraumatic.  Eyes: Conjunctivae and EOM are normal. Pupils are equal, round, and reactive to light. No discharge.  No scleral icterus. Nose normal. Mouth/Throat: Oropharynx is clear and moist. No oropharyngeal exudate.    NECK: Normal range of motion. Neck supple. No JVD present. No tracheal deviation present. No thyromegaly present.   CARDIOVASCULAR: Normal rate, regular rhythm and intact distal pulses.  Exam reveals no gallop and no friction rub.  Systolic murmur present.  PULMONARY: Effort normal and breath sounds normal. No respiratory distress.No Wheezing or rales.  ABDOMEN: Soft. Bowel sounds are normal. No distension and no mass.  There is no tenderness. There is no rebound and no guarding. No HSM.  MUSCULOSKELETAL: Normal range of motion. Mild kyphosis, no tenderness.  LYMPH NODES: Has no cervical, supraclavicular, axillary and groin adenopathy.   NEUROLOGICAL: " Alert and oriented to person, place, and time. No cranial nerve deficit.  Normal muscle tone. Coordination normal.   Left-sided weakness.  Also has an  AFO on the left foot  GENITOURINARY: Deferred exam.  SKIN: Skin is warm and dry. No rash noted. No erythema. No pallor.   EXTREMITIES: No cyanosis, no clubbing, no edema. No Deformity.  PSYCHIATRIC: Normal mood, affect and behavior.      Lab Results     Last Comprehensive Metabolic Panel:  Lab Results   Component Value Date     04/04/2024    POTASSIUM 4.2 04/04/2024    CHLORIDE 106 04/04/2024    CO2 25 04/04/2024    ANIONGAP 11 04/04/2024    GLC 91 04/04/2024    BUN 14.3 04/04/2024    CR 1.23 (H) 04/04/2024    GFRESTIMATED 62 04/04/2024    MARAL 9.0 04/04/2024           Electronically signed by    Desiree Katz MD

## 2024-05-08 NOTE — LETTER
5/8/2024        RE: Shaji Markham  [address] [place] [address]        [facility] GERIATRIC SERVICES       Patient Shaji Markham  MRN: [id number]        Reason for Visit     Chief Complaint   Patient presents with     [facility] Regulatory       Code Status     DNR only    Assessment     Multivessel CAD-patient is felt to be a poor surgical candidate  Currently on medical management  Continue with aspirin Isordil as well as a statin  Ability to tolerate aggressive treatment limited by hypotension  Follow-up with cardiology as scheduled    Prior history of CVA post angiogram-right posterior cerebral artery  Etiology felt to be periprocedural  He is on aspirin Plavix and statins  Continue Zanaflex due to ongoing concerns about muscle spasms he is also on gabapentin and Tylenol    Hyperlipidemia continue with his statins    Severe bilateral carotid artery disease greater than 70%  Outpatient follow-up with vascular surgery recommended for him-patient has been told to delay his surgery at least for 3 months and continue with therapy he does have severe disease.  He told me he plans to seek a second opinion    Hypertension complicated by low blood pressures  He is no longer on any antihypertensives    Diabetes type 2  Appears to be diet controlled plan would be to check another A1c  Last creatinine was 6.8    CKD 3b-recheck BMP as ordered  Last creatinine stable at 1.2    Prior history of tobacco use    Mood disorder/adjustment reaction  Was followed by psychiatry  Reports mood is improved however still having difficulty adjusting his daughter is working at finding appropriate placement.  Continue Lexapro.  On a low-dose of Seroquel    Dysphagia apparently he has been upgraded to thin liquids.  Reporting that he is swallowing much better      Generalized weakness  At baseline due to decline he has become wheelchair-bound he has a history of recurrent falls        History     Patient is a very pleasant 73  year old male who is admitted to long-term care  Patient was initially admitted to the hospital with multivessel CAD  He has a history of CVA post angiogram with resultant left-sided weakness  Hopkins to be a poor surgical candidate and discharged on medical management  Course complicated by hypotension  Was in the TCU and now moved to long-term care      Past Medical & Surgical History   Diabetes  Multivessel CAD  CKD      Past Social History     Reviewed,  has a past history of smoking denies alcohol use    Family History     Reviewed, and family history is not on file.    Medication List     Current Outpatient Medications   Medication Sig Dispense Refill     acetaminophen (TYLENOL) 325 MG tablet Take 650 mg by mouth 3 times daily       aspirin 81 MG EC tablet Take 81 mg by mouth daily       atorvastatin (LIPITOR) 80 MG tablet Take 80 mg by mouth at bedtime       clopidogrel (PLAVIX) 75 MG tablet Take 75 mg by mouth daily       gabapentin (NEURONTIN) 100 MG capsule Take 100 mg by mouth at bedtime       isosorbide dinitrate (ISORDIL) 5 MG tablet Take 1 tablet (5 mg) by mouth 2 times daily       QUEtiapine (SEROQUEL) 25 MG tablet Take 12.5 mg by mouth daily as needed       senna-docusate (SENOKOT-S/PERICOLACE) 8.6-50 MG tablet Take 1 tablet by mouth 2 times daily       tiZANidine (ZANAFLEX) 2 MG capsule Take 2 mg by mouth daily       escitalopram (LEXAPRO) 20 MG tablet Take 1 tablet (20 mg) by mouth daily       No current facility-administered medications for this visit.      MED REC REQUIRED  Post Medication Reconciliation Status: discharge medications reconciled, continue medications without change       Allergies     Allergies   Allergen Reactions     Latex Hives and Rash     Anesthetics, Lorna Rash       Review of Systems   A comprehensive review of 14 systems was done. Pertinent findings noted here and in history of present illness. All the rest negative.  Constitutional: Negative.  Negative for fever, chills, he  "has  activity change, appetite change and fatigue.  Dysphagia as improved   HENT: Negative for congestion and facial swelling.    Eyes: Negative for photophobia, redness and visual disturbance.   Respiratory: Negative for cough and chest tightness.    Cardiovascular: Negative for chest pain, palpitations and leg swelling.   Gastrointestinal: Negative for nausea, diarrhea, constipation, blood in stool and abdominal distention.   Genitourinary: Negative.    Musculoskeletal: Reporting multiple falls  Has difficulty walking and using a wheelchair but walking in the parallel bars has an AFO due to foot drop  Skin: Negative.    Neurological: Negative for dizziness, tremors, syncope, weakness, light-headedness and headaches.  Reporting muscle spasms  Left-sided hemiparesis  Hematological: Does not bruise/bleed easily.   Psychiatric/Behavioral: Negative.  Mood is improved per patient      Physical Exam   BP (!) 144/75   Pulse 59   Temp 98  F (36.7  C)   Resp 18   Ht 1.778 m (5' 10\")   Wt 72.6 kg (160 lb)   SpO2 96%   BMI 22.96 kg/m       Constitutional: Oriented to person, place, and time and appears well-developed.   HEENT:  Normocephalic and atraumatic.  Eyes: Conjunctivae and EOM are normal. Pupils are equal, round, and reactive to light. No discharge.  No scleral icterus. Nose normal. Mouth/Throat: Oropharynx is clear and moist. No oropharyngeal exudate.    NECK: Normal range of motion. Neck supple. No JVD present. No tracheal deviation present. No thyromegaly present.   CARDIOVASCULAR: Normal rate, regular rhythm and intact distal pulses.  Exam reveals no gallop and no friction rub.  Systolic murmur present.  PULMONARY: Effort normal and breath sounds normal. No respiratory distress.No Wheezing or rales.  ABDOMEN: Soft. Bowel sounds are normal. No distension and no mass.  There is no tenderness. There is no rebound and no guarding. No HSM.  MUSCULOSKELETAL: Normal range of motion. Mild kyphosis, no " tenderness.  LYMPH NODES: Has no cervical, supraclavicular, axillary and groin adenopathy.   NEUROLOGICAL: Alert and oriented to person, place, and time. No cranial nerve deficit.  Normal muscle tone. Coordination normal.   Left-sided weakness.  Also has an  AFO on the left foot  GENITOURINARY: Deferred exam.  SKIN: Skin is warm and dry. No rash noted. No erythema. No pallor.   EXTREMITIES: No cyanosis, no clubbing, no edema. No Deformity.  PSYCHIATRIC: Normal mood, affect and behavior.      Lab Results     Last Comprehensive Metabolic Panel:  Lab Results   Component Value Date     04/04/2024    POTASSIUM 4.2 04/04/2024    CHLORIDE 106 04/04/2024    CO2 25 04/04/2024    ANIONGAP 11 04/04/2024    GLC 91 04/04/2024    BUN 14.3 04/04/2024    CR 1.23 (H) 04/04/2024    GFRESTIMATED 62 04/04/2024    MARAL 9.0 04/04/2024           Electronically signed by    eDsiree Katz MD                            Sincerely,        GAURI Fitzgerald

## 2024-06-12 ENCOUNTER — NURSING HOME VISIT (OUTPATIENT)
Dept: GERIATRICS | Facility: CLINIC | Age: 74
End: 2024-06-12
Payer: COMMERCIAL

## 2024-06-12 VITALS
BODY MASS INDEX: 25.63 KG/M2 | RESPIRATION RATE: 18 BRPM | OXYGEN SATURATION: 97 % | DIASTOLIC BLOOD PRESSURE: 66 MMHG | WEIGHT: 178.6 LBS | TEMPERATURE: 97.8 F | SYSTOLIC BLOOD PRESSURE: 127 MMHG | HEART RATE: 57 BPM

## 2024-06-12 DIAGNOSIS — I69.398 SPASTICITY AS LATE EFFECT OF CEREBROVASCULAR ACCIDENT (CVA): ICD-10-CM

## 2024-06-12 DIAGNOSIS — R25.2 SPASTICITY AS LATE EFFECT OF CEREBROVASCULAR ACCIDENT (CVA): ICD-10-CM

## 2024-06-12 DIAGNOSIS — E11.00 TYPE 2 DIABETES MELLITUS WITH HYPEROSMOLARITY WITHOUT COMA, UNSPECIFIED WHETHER LONG TERM INSULIN USE (H): ICD-10-CM

## 2024-06-12 DIAGNOSIS — I63.531 ACUTE ISCHEMIC RIGHT POSTERIOR CEREBRAL ARTERY (PCA) STROKE (H): Primary | ICD-10-CM

## 2024-06-12 DIAGNOSIS — I25.118 CORONARY ARTERY DISEASE OF NATIVE ARTERY OF NATIVE HEART WITH STABLE ANGINA PECTORIS (H): ICD-10-CM

## 2024-06-12 DIAGNOSIS — N18.32 STAGE 3B CHRONIC KIDNEY DISEASE (H): ICD-10-CM

## 2024-06-12 DIAGNOSIS — I69.391 DYSPHAGIA AS LATE EFFECT OF CEREBROVASCULAR ACCIDENT (CVA): ICD-10-CM

## 2024-06-12 PROCEDURE — 99309 SBSQ NF CARE MODERATE MDM 30: CPT | Performed by: NURSE PRACTITIONER

## 2024-06-12 NOTE — LETTER
6/12/2024      Shaji Markham  [address] [place] [address]        [facility]    Code Status:  DNR   Visit Type:   Chief Complaint   Patient presents with     prison Regulatory     Facility:  [facility] () [[id number]]         HPI: Shaji Markham is a 73 year old male who I am seeing today for regulatory visit on long-term care. Patient transferred from the TCU to long-term care.  Pt recently hospitalized with CVA with left-sided hemiparesis.  Past medical history includes CAD with recent CVA, dysphagia, depression with anxiety, DM2 and CKD stage 3b.     Transitional Care Course: Today pt lying in bed. Recent acute CVA with left sided weakness.  He continues with AFO splinting to left lower extremity.  Slight contracture of the knee noted.  He is gaining some strength.  Continues on tizanidine for spasticity.  Neuropathic pain well-controlled with gabapentin.  Depression.  Seems to be improving with Lexapro.  He is settling into long-term care.  Previous behaviors.  He has been weaned off of Seroquel.  Dysphagia.  Patient is on soft and bite-size diet.  He grabbed an orange yesterday after out of a fruit bowl and began to eat it.  He was able to spit it up.  Lung sounds clear.  No cough on exam.    Assessment/Plan:     Coronary artery disease of native artery of native heart with stable angina pectoris (H24)  Acute ischemic right posterior cerebral artery (PCA) stroke (H)  Spasticity  -Continue ASA, clopidogrel and statin.   -left side hemiparesis.   -Continue AFO splinting to left lower extremity.  -continue isosorbide 5 mg BID. (Hold for SBP < 95.)   -continues tizanidine 2 mg every day for spasticity.    Dysphagia  -Patient on speech therapy.  -His diet has been upgraded to soft, moist and bite sized. Thin liquids.  -He attempted to eat a whole orange yesterday.  No cough today.  Lung sounds clear.  Monitor.  -Precautions in place.    Anxiety with depression  -Continue  Lexapro 20 mg every day.   -Previous agitation.  Patient has been weaned off of Seroquel.  Stable.    Type 2 diabetes mellitus with hyperosmolarity without coma, unspecified whether long term insulin use (H)  -diet controlled.     HTN  With recent hypotension   -metoprolol discontinued during hospitalization.   -continue isosorbide hold for SBP < 95.   -GAEL hose Q day for bp support.     Acute kidney injury superimposed on CKD  (H24)  -Creatine 1.2-1.3.     Active Ambulatory Problems     Diagnosis Date Noted     Abnormal stress test 01/04/2024     Acute ischemic right posterior cerebral artery (PCA) stroke (H) 01/04/2024     Cerebrovascular accident (CVA) due to occlusion of left posterior cerebral artery (H) 07/04/2022     Type 2 diabetes mellitus (H) 02/16/2017     Tinnitus, bilateral 04/04/2022     Stage 3b chronic kidney disease (H) 03/13/2021     Onychomycosis of right great toe 04/05/2023     NAFLD (nonalcoholic fatty liver disease) 10/09/2019     Mixed dyslipidemia 05/05/2011     Elevated lipoprotein(a) 04/06/2022     Coronary artery disease of native artery of native heart with stable angina pectoris (H24) 01/04/2024     Visceral obesity 11/13/2020     Periodontitis 12/06/2023     History of CVA (cerebrovascular accident) 03/10/2024     Hypotension 03/10/2024     Resolved Ambulatory Problems     Diagnosis Date Noted     No Resolved Ambulatory Problems     No Additional Past Medical History     Allergies   Allergen Reactions     Latex Hives and Rash     Anesthetics, Lorna Rash       All Meds and Allergies reviewed in the record at the facility and is the most up-to-date.    Current Outpatient Medications   Medication Sig Dispense Refill     acetaminophen (TYLENOL) 325 MG tablet Take 650 mg by mouth 3 times daily       aspirin 81 MG EC tablet Take 81 mg by mouth daily       atorvastatin (LIPITOR) 80 MG tablet Take 80 mg by mouth at bedtime       clopidogrel (PLAVIX) 75 MG tablet Take 75 mg by mouth daily        escitalopram (LEXAPRO) 20 MG tablet Take 1 tablet (20 mg) by mouth daily       gabapentin (NEURONTIN) 100 MG capsule Take 100 mg by mouth at bedtime       isosorbide dinitrate (ISORDIL) 5 MG tablet Take 1 tablet (5 mg) by mouth 2 times daily       QUEtiapine (SEROQUEL) 25 MG tablet Take 12.5 mg by mouth daily as needed       senna-docusate (SENOKOT-S/PERICOLACE) 8.6-50 MG tablet Take 1 tablet by mouth 2 times daily       tiZANidine (ZANAFLEX) 2 MG capsule Take 2 mg by mouth daily       No current facility-administered medications for this visit.       REVIEW OF SYSTEMS:   10 point review of systems reviewed and pertinent positives in the HPI.     PHYSICAL EXAMINATION:  Physical Exam     Vital signs: /66   Pulse 57   Temp 97.8  F (36.6  C)   Resp 18   Wt 81 kg (178 lb 9.6 oz)   SpO2 97%   BMI 25.63 kg/m    General: Awake, Alert, oriented x3, lying in bed,  appropriately, follows simple commands, conversant  HEENT: Pink conjunctiva, moist oral mucosa.   NECK: Supple  CVS:  S1  S2, without murmur or gallop.   LUNG: Clear to auscultation, No wheezes, rales or rhonci.  BACK: No kyphosis of the thoracic spine  ABDOMEN: Soft, nontender to palpation, with positive bowel sounds  EXTREMITIES: Left sided hemiparesis, no pedal edema, no calf tenderness.  Strength improving in his left side.  He is able to raise his arm passively.  He has AFO splinting to left lower extremity.  Slight contracture noted of the knee.  SKIN: Intact.  NEUROLOGIC: Intact, pulses palpable  PSYCHIATRIC: Mood stable. Pleasant affect.       Labs:  All labs reviewed in the nursing home record and [facility]   @  Lab Results   Component Value Date    WBC 8.2 03/07/2024     Lab Results   Component Value Date    RBC 3.80 03/07/2024     Lab Results   Component Value Date    HGB 12.1 03/07/2024     Lab Results   Component Value Date    HCT 37.1 03/07/2024     Lab Results   Component Value Date    MCV 98 03/07/2024     Lab Results   Component Value Date     MCH 31.8 03/07/2024     Lab Results   Component Value Date    MCHC 32.6 03/07/2024     Lab Results   Component Value Date    RDW 13.7 03/07/2024     Lab Results   Component Value Date     03/07/2024        @Last Comprehensive Metabolic Panel:  Sodium   Date Value Ref Range Status   04/04/2024 142 135 - 145 mmol/L Final     Comment:     Reference intervals for this test were updated on 09/26/2023 to more accurately reflect our healthy population. There may be differences in the flagging of prior results with similar values performed with this method. Interpretation of those prior results can be made in the context of the updated reference intervals.      Potassium   Date Value Ref Range Status   04/04/2024 4.2 3.4 - 5.3 mmol/L Final     Chloride   Date Value Ref Range Status   04/04/2024 106 98 - 107 mmol/L Final     Carbon Dioxide (CO2)   Date Value Ref Range Status   04/04/2024 25 22 - 29 mmol/L Final     Anion Gap   Date Value Ref Range Status   04/04/2024 11 7 - 15 mmol/L Final     Glucose   Date Value Ref Range Status   04/04/2024 91 70 - 99 mg/dL Final     Urea Nitrogen   Date Value Ref Range Status   04/04/2024 14.3 8.0 - 23.0 mg/dL Final     Creatinine   Date Value Ref Range Status   04/04/2024 1.23 (H) 0.67 - 1.17 mg/dL Final     GFR Estimate   Date Value Ref Range Status   04/04/2024 62 >60 mL/min/1.73m2 Final     Calcium   Date Value Ref Range Status   04/04/2024 9.0 8.8 - 10.2 mg/dL Final       This note has been dictated using voice recognition software. Any grammatical or context distortions are unintentional and inherent to the software    Electronically signed by: Essence Conteh CNP       Sincerely,        Essence Conteh NP

## 2024-06-13 NOTE — PROGRESS NOTES
[facility]    Code Status:  DNR   Visit Type:   Chief Complaint   Patient presents with    longterm Regulatory     Facility:  [facility] () [[id number]]         HPI: Shaji Markham is a 73 year old male who I am seeing today for regulatory visit on long-term care. Patient transferred from the TCU to long-term care.  Pt recently hospitalized with CVA with left-sided hemiparesis.  Past medical history includes CAD with recent CVA, dysphagia, depression with anxiety, DM2 and CKD stage 3b.     Transitional Care Course: Today pt lying in bed. Recent acute CVA with left sided weakness.  He continues with AFO splinting to left lower extremity.  Slight contracture of the knee noted.  He is gaining some strength.  Continues on tizanidine for spasticity.  Neuropathic pain well-controlled with gabapentin.  Depression.  Seems to be improving with Lexapro.  He is settling into long-term care.  Previous behaviors.  He has been weaned off of Seroquel.  Dysphagia.  Patient is on soft and bite-size diet.  He grabbed an orange yesterday after out of a fruit bowl and began to eat it.  He was able to spit it up.  Lung sounds clear.  No cough on exam.    Assessment/Plan:     Coronary artery disease of native artery of native heart with stable angina pectoris (H24)  Acute ischemic right posterior cerebral artery (PCA) stroke (H)  Spasticity  -Continue ASA, clopidogrel and statin.   -left side hemiparesis.   -Continue AFO splinting to left lower extremity.  -continue isosorbide 5 mg BID. (Hold for SBP < 95.)   -continues tizanidine 2 mg every day for spasticity.    Dysphagia  -Patient on speech therapy.  -His diet has been upgraded to soft, moist and bite sized. Thin liquids.  -He attempted to eat a whole orange yesterday.  No cough today.  Lung sounds clear.  Monitor.  -Precautions in place.    Anxiety with depression  -Continue Lexapro 20 mg every day.   -Previous agitation.  Patient has been weaned off  of Seroquel.  Stable.    Type 2 diabetes mellitus with hyperosmolarity without coma, unspecified whether long term insulin use (H)  -diet controlled.     HTN  With recent hypotension   -metoprolol discontinued during hospitalization.   -continue isosorbide hold for SBP < 95.   -GAEL hose Q day for bp support.     Acute kidney injury superimposed on CKD  (H24)  -Creatine 1.2-1.3.     Active Ambulatory Problems     Diagnosis Date Noted    Abnormal stress test 01/04/2024    Acute ischemic right posterior cerebral artery (PCA) stroke (H) 01/04/2024    Cerebrovascular accident (CVA) due to occlusion of left posterior cerebral artery (H) 07/04/2022    Type 2 diabetes mellitus (H) 02/16/2017    Tinnitus, bilateral 04/04/2022    Stage 3b chronic kidney disease (H) 03/13/2021    Onychomycosis of right great toe 04/05/2023    NAFLD (nonalcoholic fatty liver disease) 10/09/2019    Mixed dyslipidemia 05/05/2011    Elevated lipoprotein(a) 04/06/2022    Coronary artery disease of native artery of native heart with stable angina pectoris (H24) 01/04/2024    Visceral obesity 11/13/2020    Periodontitis 12/06/2023    History of CVA (cerebrovascular accident) 03/10/2024    Hypotension 03/10/2024     Resolved Ambulatory Problems     Diagnosis Date Noted    No Resolved Ambulatory Problems     No Additional Past Medical History     Allergies   Allergen Reactions    Latex Hives and Rash    Anesthetics, Lorna Rash       All Meds and Allergies reviewed in the record at the facility and is the most up-to-date.    Current Outpatient Medications   Medication Sig Dispense Refill    acetaminophen (TYLENOL) 325 MG tablet Take 650 mg by mouth 3 times daily      aspirin 81 MG EC tablet Take 81 mg by mouth daily      atorvastatin (LIPITOR) 80 MG tablet Take 80 mg by mouth at bedtime      clopidogrel (PLAVIX) 75 MG tablet Take 75 mg by mouth daily      escitalopram (LEXAPRO) 20 MG tablet Take 1 tablet (20 mg) by mouth daily      gabapentin  (NEURONTIN) 100 MG capsule Take 100 mg by mouth at bedtime      isosorbide dinitrate (ISORDIL) 5 MG tablet Take 1 tablet (5 mg) by mouth 2 times daily      QUEtiapine (SEROQUEL) 25 MG tablet Take 12.5 mg by mouth daily as needed      senna-docusate (SENOKOT-S/PERICOLACE) 8.6-50 MG tablet Take 1 tablet by mouth 2 times daily      tiZANidine (ZANAFLEX) 2 MG capsule Take 2 mg by mouth daily       No current facility-administered medications for this visit.       REVIEW OF SYSTEMS:   10 point review of systems reviewed and pertinent positives in the HPI.     PHYSICAL EXAMINATION:  Physical Exam     Vital signs: /66   Pulse 57   Temp 97.8  F (36.6  C)   Resp 18   Wt 81 kg (178 lb 9.6 oz)   SpO2 97%   BMI 25.63 kg/m    General: Awake, Alert, oriented x3, lying in bed,  appropriately, follows simple commands, conversant  HEENT: Pink conjunctiva, moist oral mucosa.   NECK: Supple  CVS:  S1  S2, without murmur or gallop.   LUNG: Clear to auscultation, No wheezes, rales or rhonci.  BACK: No kyphosis of the thoracic spine  ABDOMEN: Soft, nontender to palpation, with positive bowel sounds  EXTREMITIES: Left sided hemiparesis, no pedal edema, no calf tenderness.  Strength improving in his left side.  He is able to raise his arm passively.  He has AFO splinting to left lower extremity.  Slight contracture noted of the knee.  SKIN: Intact.  NEUROLOGIC: Intact, pulses palpable  PSYCHIATRIC: Mood stable. Pleasant affect.       Labs:  All labs reviewed in the nursing home record and [employer]   @  Lab Results   Component Value Date    WBC 8.2 03/07/2024     Lab Results   Component Value Date    RBC 3.80 03/07/2024     Lab Results   Component Value Date    HGB 12.1 03/07/2024     Lab Results   Component Value Date    HCT 37.1 03/07/2024     Lab Results   Component Value Date    MCV 98 03/07/2024     Lab Results   Component Value Date    MCH 31.8 03/07/2024     Lab Results   Component Value Date    MCHC 32.6 03/07/2024     Lab  Results   Component Value Date    RDW 13.7 03/07/2024     Lab Results   Component Value Date     03/07/2024        @Last Comprehensive Metabolic Panel:  Sodium   Date Value Ref Range Status   04/04/2024 142 135 - 145 mmol/L Final     Comment:     Reference intervals for this test were updated on 09/26/2023 to more accurately reflect our healthy population. There may be differences in the flagging of prior results with similar values performed with this method. Interpretation of those prior results can be made in the context of the updated reference intervals.      Potassium   Date Value Ref Range Status   04/04/2024 4.2 3.4 - 5.3 mmol/L Final     Chloride   Date Value Ref Range Status   04/04/2024 106 98 - 107 mmol/L Final     Carbon Dioxide (CO2)   Date Value Ref Range Status   04/04/2024 25 22 - 29 mmol/L Final     Anion Gap   Date Value Ref Range Status   04/04/2024 11 7 - 15 mmol/L Final     Glucose   Date Value Ref Range Status   04/04/2024 91 70 - 99 mg/dL Final     Urea Nitrogen   Date Value Ref Range Status   04/04/2024 14.3 8.0 - 23.0 mg/dL Final     Creatinine   Date Value Ref Range Status   04/04/2024 1.23 (H) 0.67 - 1.17 mg/dL Final     GFR Estimate   Date Value Ref Range Status   04/04/2024 62 >60 mL/min/1.73m2 Final     Calcium   Date Value Ref Range Status   04/04/2024 9.0 8.8 - 10.2 mg/dL Final       This note has been dictated using voice recognition software. Any grammatical or context distortions are unintentional and inherent to the software    Electronically signed by: Essence Conteh, CNP

## 2024-07-12 ENCOUNTER — LAB REQUISITION (OUTPATIENT)
Dept: LAB | Facility: CLINIC | Age: 74
End: 2024-07-12
Payer: COMMERCIAL

## 2024-07-12 DIAGNOSIS — R47.81 SLURRED SPEECH: ICD-10-CM

## 2024-07-12 DIAGNOSIS — I69.991 DYSPHAGIA FOLLOWING UNSPECIFIED CEREBROVASCULAR DISEASE: ICD-10-CM

## 2024-07-12 DIAGNOSIS — I69.30 UNSPECIFIED SEQUELAE OF CEREBRAL INFARCTION: ICD-10-CM

## 2024-07-12 DIAGNOSIS — N18.32 CHRONIC KIDNEY DISEASE, STAGE 3B (H): ICD-10-CM

## 2024-07-12 DIAGNOSIS — I25.118 ATHEROSCLEROTIC HEART DISEASE OF NATIVE CORONARY ARTERY WITH OTHER FORMS OF ANGINA PECTORIS (H): ICD-10-CM

## 2024-07-12 DIAGNOSIS — F32.A DEPRESSION, UNSPECIFIED: ICD-10-CM

## 2024-07-15 LAB
ANION GAP SERPL CALCULATED.3IONS-SCNC: 12 MMOL/L (ref 7–15)
BUN SERPL-MCNC: 20.4 MG/DL (ref 8–23)
CALCIUM SERPL-MCNC: 9.6 MG/DL (ref 8.8–10.2)
CHLORIDE SERPL-SCNC: 103 MMOL/L (ref 98–107)
CREAT SERPL-MCNC: 1.12 MG/DL (ref 0.67–1.17)
EGFRCR SERPLBLD CKD-EPI 2021: 69 ML/MIN/1.73M2
ERYTHROCYTE [DISTWIDTH] IN BLOOD BY AUTOMATED COUNT: 12.8 % (ref 10–15)
GLUCOSE SERPL-MCNC: 116 MG/DL (ref 70–99)
HCO3 SERPL-SCNC: 25 MMOL/L (ref 22–29)
HCT VFR BLD AUTO: 41.3 % (ref 40–53)
HGB BLD-MCNC: 13.5 G/DL (ref 13.3–17.7)
MCH RBC QN AUTO: 32 PG (ref 26.5–33)
MCHC RBC AUTO-ENTMCNC: 32.7 G/DL (ref 31.5–36.5)
MCV RBC AUTO: 98 FL (ref 78–100)
PLATELET # BLD AUTO: 266 10E3/UL (ref 150–450)
POTASSIUM SERPL-SCNC: 4.7 MMOL/L (ref 3.4–5.3)
RBC # BLD AUTO: 4.22 10E6/UL (ref 4.4–5.9)
SODIUM SERPL-SCNC: 140 MMOL/L (ref 135–145)
WBC # BLD AUTO: 10 10E3/UL (ref 4–11)

## 2024-07-15 PROCEDURE — 36415 COLL VENOUS BLD VENIPUNCTURE: CPT | Mod: ORL | Performed by: FAMILY MEDICINE

## 2024-07-15 PROCEDURE — 80048 BASIC METABOLIC PNL TOTAL CA: CPT | Mod: ORL | Performed by: FAMILY MEDICINE

## 2024-07-15 PROCEDURE — 85027 COMPLETE CBC AUTOMATED: CPT | Mod: ORL | Performed by: FAMILY MEDICINE

## 2024-07-15 PROCEDURE — P9604 ONE-WAY ALLOW PRORATED TRIP: HCPCS | Mod: ORL | Performed by: FAMILY MEDICINE

## 2024-07-17 ENCOUNTER — NURSING HOME VISIT (OUTPATIENT)
Dept: GERIATRICS | Facility: CLINIC | Age: 74
End: 2024-07-17
Payer: COMMERCIAL

## 2024-07-17 VITALS
TEMPERATURE: 97.7 F | HEIGHT: 70 IN | BODY MASS INDEX: 25.54 KG/M2 | DIASTOLIC BLOOD PRESSURE: 69 MMHG | SYSTOLIC BLOOD PRESSURE: 156 MMHG | HEART RATE: 87 BPM | OXYGEN SATURATION: 99 % | WEIGHT: 178.4 LBS | RESPIRATION RATE: 18 BRPM

## 2024-07-17 DIAGNOSIS — I69.398 SPASTICITY AS LATE EFFECT OF CEREBROVASCULAR ACCIDENT (CVA): Primary | ICD-10-CM

## 2024-07-17 DIAGNOSIS — F34.1 PERSISTENT DEPRESSIVE DISORDER WITH ANXIOUS DISTRESS, CURRENTLY SEVERE: ICD-10-CM

## 2024-07-17 DIAGNOSIS — F41.9 ANXIETY WITH RESTLESSNESS: ICD-10-CM

## 2024-07-17 DIAGNOSIS — R25.2 SPASTICITY AS LATE EFFECT OF CEREBROVASCULAR ACCIDENT (CVA): Primary | ICD-10-CM

## 2024-07-17 DIAGNOSIS — R45.1 ANXIETY WITH RESTLESSNESS: ICD-10-CM

## 2024-07-17 DIAGNOSIS — Z86.73 HISTORY OF CVA (CEREBROVASCULAR ACCIDENT): ICD-10-CM

## 2024-07-17 PROCEDURE — 99309 SBSQ NF CARE MODERATE MDM 30: CPT | Performed by: NURSE PRACTITIONER

## 2024-07-17 NOTE — LETTER
" 7/17/2024      Shaji Markham  [address] [place] [address]        [facility]    Code Status:  DNR   Visit Type:   Chief Complaint   Patient presents with     Nursing Home Acute     Facility:  [facility] () [[id number]]         HPI: Shaji Markham is a 73 year old male who I am seeing today at the request of the nursing staff. Patient transferred from the TCU to long-term care.  Pt recently hospitalized with CVA with left-sided hemiparesis.  Past medical history includes CAD with recent CVA, dysphagia, depression with anxiety, DM2 and CKD stage 3b.     Transitional Care Course: Today pt lying in bed. Hx of CVA with left sided weakness. He continues with AFO splinting to left lower extremity.  Patient now residing on long-term care.  Patient continues on tizanidine for spasticity and gabapentin for neuropathic pain.  He denies any pain on today's visit.  History of anxiety with depression.  He has been followed by [facility].  He continues on Lexapro.  Nursing staff report recent increased depressive symptomology.  Nursing staff reporting patient making statements \" I would be better off dead.\"  I did speak with patient about this today.  He appears somewhat restless and agitated on exam.  Nursing staff report like symptoms as well.  Patient does not have a plan to harm himself.  He reports dislike of being in a facility and having his physical limitations.      Assessment/Plan:     Coronary artery disease of native artery of native heart with stable angina pectoris (H24)  Acute ischemic right posterior cerebral artery (PCA) stroke (H)  Spasticity  -Continue ASA, clopidogrel and statin.   -left side hemiparesis.   -Continue AFO splinting to left lower extremity.  -continue isosorbide 5 mg BID. (Hold for SBP < 95.)   -continues tizanidine 2 mg every day for spasticity.  -Continue low-dose gabapentin for neuropathic pain.    Anxiety with depression  -Continue Lexapro 20 mg every day. "   -Recent bouts of increased restlessness and agitation.  -Resume Seroquel 12.5 mg p.o. twice daily.  -Patient is being followed by [facility].  -Patient has no plan of harming himself.      Active Ambulatory Problems     Diagnosis Date Noted     Abnormal stress test 01/04/2024     Acute ischemic right posterior cerebral artery (PCA) stroke (H) 01/04/2024     Cerebrovascular accident (CVA) due to occlusion of left posterior cerebral artery (H) 07/04/2022     Type 2 diabetes mellitus (H) 02/16/2017     Tinnitus, bilateral 04/04/2022     Stage 3b chronic kidney disease (H) 03/13/2021     Onychomycosis of right great toe 04/05/2023     NAFLD (nonalcoholic fatty liver disease) 10/09/2019     Mixed dyslipidemia 05/05/2011     Elevated lipoprotein(a) 04/06/2022     Coronary artery disease of native artery of native heart with stable angina pectoris (H24) 01/04/2024     Visceral obesity 11/13/2020     Periodontitis 12/06/2023     History of CVA (cerebrovascular accident) 03/10/2024     Hypotension 03/10/2024     Resolved Ambulatory Problems     Diagnosis Date Noted     No Resolved Ambulatory Problems     No Additional Past Medical History     Allergies   Allergen Reactions     Latex Hives and Rash     Anesthetics, Lorna Rash       All Meds and Allergies reviewed in the record at the facility and is the most up-to-date.    Current Outpatient Medications   Medication Sig Dispense Refill     acetaminophen (TYLENOL) 325 MG tablet Take 650 mg by mouth 3 times daily       aspirin 81 MG EC tablet Take 81 mg by mouth daily       atorvastatin (LIPITOR) 80 MG tablet Take 80 mg by mouth at bedtime       clopidogrel (PLAVIX) 75 MG tablet Take 75 mg by mouth daily       escitalopram (LEXAPRO) 20 MG tablet Take 1 tablet (20 mg) by mouth daily       gabapentin (NEURONTIN) 100 MG capsule Take 100 mg by mouth at bedtime       isosorbide dinitrate (ISORDIL) 5 MG tablet Take 1 tablet (5 mg) by mouth 2 times daily       QUEtiapine (SEROQUEL) 25 MG  "tablet Take 12.5 mg by mouth 2 times daily as needed       senna-docusate (SENOKOT-S/PERICOLACE) 8.6-50 MG tablet Take 1 tablet by mouth 2 times daily       tiZANidine (ZANAFLEX) 2 MG capsule Take 2 mg by mouth daily       No current facility-administered medications for this visit.       REVIEW OF SYSTEMS:   10 point review of systems reviewed and pertinent positives in the HPI.     PHYSICAL EXAMINATION:  Physical Exam     Vital signs: BP (!) 156/69   Pulse 87   Temp 97.7  F (36.5  C)   Resp 18   Ht 1.778 m (5' 10\")   Wt 80.9 kg (178 lb 6.4 oz)   SpO2 99%   BMI 25.60 kg/m    General: Awake, Alert, oriented x3, lying in bed,   conversant  HEENT: Pink conjunctiva, moist oral mucosa.   NECK: Supple  CVS:  S1  S2, without murmur or gallop.   LUNG: Clear to auscultation, No wheezes, rales or rhonci.  BACK: No kyphosis of the thoracic spine  EXTREMITIES: Left sided hemiparesis, no pedal edema, no calf tenderness.  Strength improving in his left side.  He is able to raise his arm half way without assistance. He is able to SLR on the LLE. Constantly bouncing his LE.   SKIN: Intact.  NEUROLOGIC: Intact, pulses palpable  PSYCHIATRIC: Restlessness noted. Depressive/anxious symptomology noted.       Labs:  All labs reviewed in the nursing home record and [facility]   @  Lab Results   Component Value Date    WBC 8.2 03/07/2024     Lab Results   Component Value Date    RBC 3.80 03/07/2024     Lab Results   Component Value Date    HGB 12.1 03/07/2024     Lab Results   Component Value Date    HCT 37.1 03/07/2024     Lab Results   Component Value Date    MCV 98 03/07/2024     Lab Results   Component Value Date    MCH 31.8 03/07/2024     Lab Results   Component Value Date    MCHC 32.6 03/07/2024     Lab Results   Component Value Date    RDW 13.7 03/07/2024     Lab Results   Component Value Date     03/07/2024        @Last Comprehensive Metabolic Panel:  Sodium   Date Value Ref Range Status   07/15/2024 140 135 - 145 mmol/L " Final     Potassium   Date Value Ref Range Status   07/15/2024 4.7 3.4 - 5.3 mmol/L Final     Chloride   Date Value Ref Range Status   07/15/2024 103 98 - 107 mmol/L Final     Carbon Dioxide (CO2)   Date Value Ref Range Status   07/15/2024 25 22 - 29 mmol/L Final     Anion Gap   Date Value Ref Range Status   07/15/2024 12 7 - 15 mmol/L Final     Glucose   Date Value Ref Range Status   07/15/2024 116 (H) 70 - 99 mg/dL Final     Urea Nitrogen   Date Value Ref Range Status   07/15/2024 20.4 8.0 - 23.0 mg/dL Final     Creatinine   Date Value Ref Range Status   07/15/2024 1.12 0.67 - 1.17 mg/dL Final     GFR Estimate   Date Value Ref Range Status   07/15/2024 69 >60 mL/min/1.73m2 Final     Calcium   Date Value Ref Range Status   07/15/2024 9.6 8.8 - 10.2 mg/dL Final       This note has been dictated using voice recognition software. Any grammatical or context distortions are unintentional and inherent to the software    Electronically signed by: Essence Conteh, MEGAN       Sincerely,        Essence Conteh, NP

## 2024-07-19 NOTE — PROGRESS NOTES
"[facility] GERIATRIC SERVICES    Code Status:  DNR   Visit Type:   Chief Complaint   Patient presents with    Nursing Home Acute     Facility:  [facility] () [[id number]]         HPI: Shaji Markham is a 73 year old male who I am seeing today at the request of the nursing staff. Patient transferred from the TCU to long-term care.  Pt recently hospitalized with CVA with left-sided hemiparesis.  Past medical history includes CAD with recent CVA, dysphagia, depression with anxiety, DM2 and CKD stage 3b.     Transitional Care Course: Today pt lying in bed. Hx of CVA with left sided weakness. He continues with AFO splinting to left lower extremity.  Patient now residing on long-term care.  Patient continues on tizanidine for spasticity and gabapentin for neuropathic pain.  He denies any pain on today's visit.  History of anxiety with depression.  He has been followed by ACP.  He continues on Lexapro.  Nursing staff report recent increased depressive symptomology.  Nursing staff reporting patient making statements \" I would be better off dead.\"  I did speak with patient about this today.  He appears somewhat restless and agitated on exam.  Nursing staff report like symptoms as well.  Patient does not have a plan to harm himself.  He reports dislike of being in a facility and having his physical limitations.      Assessment/Plan:     Coronary artery disease of native artery of native heart with stable angina pectoris (H24)  Acute ischemic right posterior cerebral artery (PCA) stroke (H)  Spasticity  -Continue ASA, clopidogrel and statin.   -left side hemiparesis.   -Continue AFO splinting to left lower extremity.  -continue isosorbide 5 mg BID. (Hold for SBP < 95.)   -continues tizanidine 2 mg every day for spasticity.  -Continue low-dose gabapentin for neuropathic pain.    Anxiety with depression  -Continue Lexapro 20 mg every day.   -Recent bouts of increased restlessness and agitation.  -Resume Seroquel 12.5 " mg p.o. twice daily.  -Patient is being followed by ACP.  -Patient has no plan of harming himself.      Active Ambulatory Problems     Diagnosis Date Noted    Abnormal stress test 01/04/2024    Acute ischemic right posterior cerebral artery (PCA) stroke (H) 01/04/2024    Cerebrovascular accident (CVA) due to occlusion of left posterior cerebral artery (H) 07/04/2022    Type 2 diabetes mellitus (H) 02/16/2017    Tinnitus, bilateral 04/04/2022    Stage 3b chronic kidney disease (H) 03/13/2021    Onychomycosis of right great toe 04/05/2023    NAFLD (nonalcoholic fatty liver disease) 10/09/2019    Mixed dyslipidemia 05/05/2011    Elevated lipoprotein(a) 04/06/2022    Coronary artery disease of native artery of native heart with stable angina pectoris (H24) 01/04/2024    Visceral obesity 11/13/2020    Periodontitis 12/06/2023    History of CVA (cerebrovascular accident) 03/10/2024    Hypotension 03/10/2024     Resolved Ambulatory Problems     Diagnosis Date Noted    No Resolved Ambulatory Problems     No Additional Past Medical History     Allergies   Allergen Reactions    Latex Hives and Rash    Anesthetics, Lorna Rash       All Meds and Allergies reviewed in the record at the facility and is the most up-to-date.    Current Outpatient Medications   Medication Sig Dispense Refill    acetaminophen (TYLENOL) 325 MG tablet Take 650 mg by mouth 3 times daily      aspirin 81 MG EC tablet Take 81 mg by mouth daily      atorvastatin (LIPITOR) 80 MG tablet Take 80 mg by mouth at bedtime      clopidogrel (PLAVIX) 75 MG tablet Take 75 mg by mouth daily      escitalopram (LEXAPRO) 20 MG tablet Take 1 tablet (20 mg) by mouth daily      gabapentin (NEURONTIN) 100 MG capsule Take 100 mg by mouth at bedtime      isosorbide dinitrate (ISORDIL) 5 MG tablet Take 1 tablet (5 mg) by mouth 2 times daily      QUEtiapine (SEROQUEL) 25 MG tablet Take 12.5 mg by mouth 2 times daily as needed      senna-docusate (SENOKOT-S/PERICOLACE) 8.6-50 MG  "tablet Take 1 tablet by mouth 2 times daily      tiZANidine (ZANAFLEX) 2 MG capsule Take 2 mg by mouth daily       No current facility-administered medications for this visit.       REVIEW OF SYSTEMS:   10 point review of systems reviewed and pertinent positives in the HPI.     PHYSICAL EXAMINATION:  Physical Exam     Vital signs: BP (!) 156/69   Pulse 87   Temp 97.7  F (36.5  C)   Resp 18   Ht 1.778 m (5' 10\")   Wt 80.9 kg (178 lb 6.4 oz)   SpO2 99%   BMI 25.60 kg/m    General: Awake, Alert, oriented x3, lying in bed,   conversant  HEENT: Pink conjunctiva, moist oral mucosa.   NECK: Supple  CVS:  S1  S2, without murmur or gallop.   LUNG: Clear to auscultation, No wheezes, rales or rhonci.  BACK: No kyphosis of the thoracic spine  EXTREMITIES: Left sided hemiparesis, no pedal edema, no calf tenderness.  Strength improving in his left side.  He is able to raise his arm half way without assistance. He is able to SLR on the LLE. Constantly bouncing his LE.   SKIN: Intact.  NEUROLOGIC: Intact, pulses palpable  PSYCHIATRIC: Restlessness noted. Depressive/anxious symptomology noted.       Labs:  All labs reviewed in the nursing home record and Epic   @  Lab Results   Component Value Date    WBC 8.2 03/07/2024     Lab Results   Component Value Date    RBC 3.80 03/07/2024     Lab Results   Component Value Date    HGB 12.1 03/07/2024     Lab Results   Component Value Date    HCT 37.1 03/07/2024     Lab Results   Component Value Date    MCV 98 03/07/2024     Lab Results   Component Value Date    MCH 31.8 03/07/2024     Lab Results   Component Value Date    MCHC 32.6 03/07/2024     Lab Results   Component Value Date    RDW 13.7 03/07/2024     Lab Results   Component Value Date     03/07/2024        @Last Comprehensive Metabolic Panel:  Sodium   Date Value Ref Range Status   07/15/2024 140 135 - 145 mmol/L Final     Potassium   Date Value Ref Range Status   07/15/2024 4.7 3.4 - 5.3 mmol/L Final     Chloride   Date " Value Ref Range Status   07/15/2024 103 98 - 107 mmol/L Final     Carbon Dioxide (CO2)   Date Value Ref Range Status   07/15/2024 25 22 - 29 mmol/L Final     Anion Gap   Date Value Ref Range Status   07/15/2024 12 7 - 15 mmol/L Final     Glucose   Date Value Ref Range Status   07/15/2024 116 (H) 70 - 99 mg/dL Final     Urea Nitrogen   Date Value Ref Range Status   07/15/2024 20.4 8.0 - 23.0 mg/dL Final     Creatinine   Date Value Ref Range Status   07/15/2024 1.12 0.67 - 1.17 mg/dL Final     GFR Estimate   Date Value Ref Range Status   07/15/2024 69 >60 mL/min/1.73m2 Final     Calcium   Date Value Ref Range Status   07/15/2024 9.6 8.8 - 10.2 mg/dL Final       This note has been dictated using voice recognition software. Any grammatical or context distortions are unintentional and inherent to the software    Electronically signed by: Essence Conteh, CNP

## 2024-07-24 ENCOUNTER — NURSING HOME VISIT (OUTPATIENT)
Dept: GERIATRICS | Facility: CLINIC | Age: 74
End: 2024-07-24
Payer: COMMERCIAL

## 2024-07-24 VITALS
DIASTOLIC BLOOD PRESSURE: 70 MMHG | TEMPERATURE: 98.1 F | HEART RATE: 87 BPM | SYSTOLIC BLOOD PRESSURE: 139 MMHG | WEIGHT: 178 LBS | HEIGHT: 70 IN | BODY MASS INDEX: 25.48 KG/M2 | RESPIRATION RATE: 18 BRPM | OXYGEN SATURATION: 99 %

## 2024-07-24 DIAGNOSIS — N18.32 STAGE 3B CHRONIC KIDNEY DISEASE (H): ICD-10-CM

## 2024-07-24 DIAGNOSIS — Z86.73 HISTORY OF CVA (CEREBROVASCULAR ACCIDENT): Primary | ICD-10-CM

## 2024-07-24 DIAGNOSIS — I95.1 ORTHOSTATIC HYPOTENSION: ICD-10-CM

## 2024-07-24 DIAGNOSIS — E11.00 TYPE 2 DIABETES MELLITUS WITH HYPEROSMOLARITY WITHOUT COMA, UNSPECIFIED WHETHER LONG TERM INSULIN USE (H): ICD-10-CM

## 2024-07-24 DIAGNOSIS — E78.2 MIXED DYSLIPIDEMIA: ICD-10-CM

## 2024-07-24 DIAGNOSIS — I25.118 CORONARY ARTERY DISEASE OF NATIVE ARTERY OF NATIVE HEART WITH STABLE ANGINA PECTORIS (H): ICD-10-CM

## 2024-07-24 DIAGNOSIS — R13.10 DYSPHAGIA, UNSPECIFIED TYPE: ICD-10-CM

## 2024-07-24 PROCEDURE — 99418 PROLNG IP/OBS E/M EA 15 MIN: CPT | Performed by: FAMILY MEDICINE

## 2024-07-24 PROCEDURE — 99310 SBSQ NF CARE HIGH MDM 45: CPT | Performed by: FAMILY MEDICINE

## 2024-07-24 NOTE — LETTER
7/24/2024      Shaji Markham  [address] [place] [address]        [facility] GERIATRIC SERVICES       Patient Shaji Markham  MRN: [id number]        Reason for Visit     Chief Complaint   Patient presents with     long-term Regulatory       Code Status     DNR only    Assessment     Multivessel CAD-patient is felt to be a poor surgical candidate  Currently on medical management  Continue with aspirin Isordil as well as a statin  Ability to tolerate aggressive treatment limited by hypotension  Follow-up with cardiology was done and no change in treatment plan done  He is chest pain-free.    Prior history of CVA post angiogram-right posterior cerebral artery  Etiology felt to be periprocedural  He is on aspirin Plavix and statins  Continue Zanaflex due to ongoing concerns about muscle spasms   he is also on gabapentin and Tylenol  At baseline remains wheelchair-bound    Hyperlipidemia continue with his statins    Severe bilateral carotid artery disease greater than 70%  Outpatient follow-up with vascular surgery recommended for him-patient has been told to delay his surgery at least for 3 months and continue with therapy he does have severe disease.  He told me he plans to seek a second opinion    Hypertension complicated by low blood pressures  He is no longer on any antihypertensives  Monitor blood pressures    Diabetes type 2  Discontinue blood sugar checks  He is currently getting blood sugar checks twice daily with no insulin sliding scale most of the checks are under 180.  Start him on metformin 500 mg daily  Recheck A1c in a few weeks  Goal would be to get his A1c under 6.5    CKD 3b-recheck BMP as ordered  Last creatinine stable at 1.2  Monitor BMPs    Prior history of tobacco use currently abstaining    Mood disorder/adjustment reaction  Underlying history of vascular dementia with increasing agitation  Nursing concerns about repeated voicing of suicidality.  He is under significant distress and is  quite agitated today  Reporting he would like to discharge if he does not get his scooter  He has been on Seroquel but unfortunately the as needed dose has not been utilized.  Lengthy discussion done with patient and care plan reviewed.  Scheduled Seroquel at noon and 5 PM.  ACP consult for the patient.  Continue Lexapro and high doses.  Monitor mood and if he continues to be agitated or has psychosis and agitation he may benefit from a antipsychotic.  Care plan reviewed with the patient and staff    Dysphagia apparently he has been upgraded to thin liquids.  Reporting that he is swallowing much better     diabetic neuropathy increase gabapentin to 100 twice daily\  Hoping pain is not keeping him in distress.  He denies any concerns with that    Generalized weakness  At baseline due to decline he has become wheelchair-bound he has a history of recurrent falls  Currently having increasing behavioral escalation and requesting a discharge home.  Medication adjustment done.  Time spent in this visit was 60 minutes including time spent reviewing care plan with nursing including behaviors.   also reviewed discharge planning  His daughter is trying to find appropriate placement for him but presently feels long-term care is best option for him        History     Patient is a very pleasant 74 year old male who is admitted to long-term care  Patient was initially admitted to the hospital with multivessel CAD  He has a history of CVA post angiogram with resultant left-sided weakness  [place] to be a poor surgical candidate and discharged on medical management  Course complicated by hypotension  Was in the TCU and now moved to long-term care  Unfortunately not digesting very well and having significant outbursts as well as episodes of agitation and confusion.  Nursing is concerned because of escalating behaviors today he is quite upset he told me he wants to discharge unless he could get his scooter.  He is also upset  and has been voicing that he wants to die to several caregivers and staff members      Past Medical & Surgical History   Diabetes  Multivessel CAD  CKD      Past Social History     Reviewed,  has a past history of smoking denies alcohol use    Family History     Reviewed, and family history is not on file.    Medication List     Current Outpatient Medications   Medication Sig Dispense Refill     acetaminophen (TYLENOL) 325 MG tablet Take 650 mg by mouth 3 times daily       aspirin 81 MG EC tablet Take 81 mg by mouth daily       atorvastatin (LIPITOR) 80 MG tablet Take 80 mg by mouth at bedtime       clopidogrel (PLAVIX) 75 MG tablet Take 75 mg by mouth daily       escitalopram (LEXAPRO) 20 MG tablet Take 1 tablet (20 mg) by mouth daily       gabapentin (NEURONTIN) 100 MG capsule Take 100 mg by mouth at bedtime       isosorbide dinitrate (ISORDIL) 5 MG tablet Take 1 tablet (5 mg) by mouth 2 times daily       QUEtiapine (SEROQUEL) 25 MG tablet Take 12.5 mg by mouth 2 times daily as needed       senna-docusate (SENOKOT-S/PERICOLACE) 8.6-50 MG tablet Take 1 tablet by mouth 2 times daily       tiZANidine (ZANAFLEX) 2 MG capsule Take 2 mg by mouth daily       No current facility-administered medications for this visit.      MED REC REQUIRED  Post Medication Reconciliation Status: discharge medications reconciled, continue medications without change       Allergies     Allergies   Allergen Reactions     Latex Hives and Rash     Anesthetics, Lorna Rash       Review of Systems   A comprehensive review of 14 systems was done. Pertinent findings noted here and in history of present illness. All the rest negative.  Constitutional: Negative.  Negative for fever, chills, he has  activity change, appetite change and fatigue.  Dysphagia as improved   HENT: Negative for congestion and facial swelling.    Eyes: Negative for photophobia, redness and visual disturbance.   Respiratory: Negative for cough and chest tightness.   "  Cardiovascular: Negative for chest pain, palpitations and has leg swelling.   Gastrointestinal: Negative for nausea, diarrhea, constipation, blood in stool and abdominal distention.   Genitourinary: Negative.    Musculoskeletal: Reporting multiple falls  Has difficulty walking and using a wheelchair but walking in the parallel bars has an AFO due to foot drop  Skin: Negative.    Neurological: Negative for dizziness, tremors, syncope, weakness, light-headedness and headaches.  Agitated and some confusion noted  Reporting muscle spasms  Left-sided hemiparesis  Hematological: Does not bruise/bleed easily.   Psychiatric/Behavioral: Negative.  Mood is improved per patient  However staff is reporting increasing voicing of suicidality.  He has had several behavioral issues and gets very agitated and angry.  He told me he does not like being confined to his room he would like to go outside.  He would like to discharge if he cannot move in with his wife  Per staff his wife is no longer with him      Physical Exam   /70   Pulse 87   Temp 98.1  F (36.7  C)   Resp 18   Ht 1.778 m (5' 10\")   Wt 80.7 kg (178 lb)   SpO2 99%   BMI 25.54 kg/m       Constitutional: Oriented to person, place, and time and appears well-developed.   HEENT:  Normocephalic and atraumatic.  Eyes: Conjunctivae and EOM are normal. Pupils are equal, round, and reactive to light. No discharge.  No scleral icterus. Nose normal. Mouth/Throat: Oropharynx is clear and moist. No oropharyngeal exudate.    NECK: Normal range of motion. Neck supple. No JVD present. No tracheal deviation present. No thyromegaly present.   CARDIOVASCULAR: Normal rate, regular rhythm and intact distal pulses.  Exam reveals no gallop and no friction rub.  Systolic murmur present.  PULMONARY: Effort normal and breath sounds normal. No respiratory distress.No Wheezing or rales.  ABDOMEN: Soft. Bowel sounds are normal. No distension and no mass.  There is no tenderness. There " is no rebound and no guarding. No HSM.  MUSCULOSKELETAL: Normal range of motion. Mild kyphosis, no tenderness.  LYMPH NODES: Has no cervical, supraclavicular, axillary and groin adenopathy.   NEUROLOGICAL: Alert and oriented to person, place, and time. No cranial nerve deficit.  Normal muscle tone. Coordination normal.   Left-sided weakness.  Also has an  AFO on the left foot  GENITOURINARY: Deferred exam.  SKIN: Skin is warm and dry. No rash noted. No erythema. No pallor.   EXTREMITIES: No cyanosis, no clubbing, has edema. No Deformity.  PSYCHIATRIC: abNormal mood, affect and behavior.  Agitated and upset      Lab Results     Last Comprehensive Metabolic Panel:  Lab Results   Component Value Date     07/15/2024    POTASSIUM 4.7 07/15/2024    CHLORIDE 103 07/15/2024    CO2 25 07/15/2024    ANIONGAP 12 07/15/2024     (H) 07/15/2024    BUN 20.4 07/15/2024    CR 1.12 07/15/2024    GFRESTIMATED 69 07/15/2024    MARAL 9.6 07/15/2024           Electronically signed by    Desiree Katz MD                            Sincerely,        GAURI Fitzgerald

## 2024-07-24 NOTE — PROGRESS NOTES
[facility] GERIATRIC SERVICES       Patient Shaji Markham  MRN: [id number]        Reason for Visit     Chief Complaint   Patient presents with    [facility] Regulatory       Code Status     DNR only    Assessment     Multivessel CAD-patient is felt to be a poor surgical candidate  Currently on medical management  Continue with aspirin Isordil as well as a statin  Ability to tolerate aggressive treatment limited by hypotension  Follow-up with cardiology was done and no change in treatment plan done  He is chest pain-free.    Prior history of CVA post angiogram-right posterior cerebral artery  Etiology felt to be periprocedural  He is on aspirin Plavix and statins  Continue Zanaflex due to ongoing concerns about muscle spasms   he is also on gabapentin and Tylenol  At baseline remains wheelchair-bound    Hyperlipidemia continue with his statins    Severe bilateral carotid artery disease greater than 70%  Outpatient follow-up with vascular surgery recommended for him-patient has been told to delay his surgery at least for 3 months and continue with therapy he does have severe disease.  He told me he plans to seek a second opinion    Hypertension complicated by low blood pressures  He is no longer on any antihypertensives  Monitor blood pressures    Diabetes type 2  Discontinue blood sugar checks  He is currently getting blood sugar checks twice daily with no insulin sliding scale most of the checks are under 180.  Start him on metformin 500 mg daily  Recheck A1c in a few weeks  Goal would be to get his A1c under 6.5    CKD 3b-recheck BMP as ordered  Last creatinine stable at 1.2  Monitor BMPs    Prior history of tobacco use currently abstaining    Mood disorder/adjustment reaction  Underlying history of vascular dementia with increasing agitation  Nursing concerns about repeated voicing of suicidality.  He is under significant distress and is quite agitated today  Reporting he would like to discharge if he does not  get his scooter  He has been on Seroquel but unfortunately the as needed dose has not been utilized.  Lengthy discussion done with patient and care plan reviewed.  Scheduled Seroquel at noon and 5 PM.  ACP consult for the patient.  Continue Lexapro and high doses.  Monitor mood and if he continues to be agitated or has psychosis and agitation he may benefit from a antipsychotic.  Care plan reviewed with the patient and staff    Dysphagia apparently he has been upgraded to thin liquids.  Reporting that he is swallowing much better     diabetic neuropathy increase gabapentin to 100 twice daily\  Hoping pain is not keeping him in distress.  He denies any concerns with that    Generalized weakness  At baseline due to decline he has become wheelchair-bound he has a history of recurrent falls  Currently having increasing behavioral escalation and requesting a discharge home.  Medication adjustment done.  Time spent in this visit was 60 minutes including time spent reviewing care plan with nursing including behaviors.   also reviewed discharge planning  His daughter is trying to find appropriate placement for him but presently feels long-term care is best option for him        History     Patient is a very pleasant 74 year old male who is admitted to long-term care  Patient was initially admitted to the hospital with multivessel CAD  He has a history of CVA post angiogram with resultant left-sided weakness  [place] to be a poor surgical candidate and discharged on medical management  Course complicated by hypotension  Was in the TCU and now moved to long-term care  Unfortunately not digesting very well and having significant outbursts as well as episodes of agitation and confusion.  Nursing is concerned because of escalating behaviors today he is quite upset he told me he wants to discharge unless he could get his scooter.  He is also upset and has been voicing that he wants to die to several caregivers and staff  members      Past Medical & Surgical History   Diabetes  Multivessel CAD  CKD      Past Social History     Reviewed,  has a past history of smoking denies alcohol use    Family History     Reviewed, and family history is not on file.    Medication List     Current Outpatient Medications   Medication Sig Dispense Refill    acetaminophen (TYLENOL) 325 MG tablet Take 650 mg by mouth 3 times daily      aspirin 81 MG EC tablet Take 81 mg by mouth daily      atorvastatin (LIPITOR) 80 MG tablet Take 80 mg by mouth at bedtime      clopidogrel (PLAVIX) 75 MG tablet Take 75 mg by mouth daily      escitalopram (LEXAPRO) 20 MG tablet Take 1 tablet (20 mg) by mouth daily      gabapentin (NEURONTIN) 100 MG capsule Take 100 mg by mouth at bedtime      isosorbide dinitrate (ISORDIL) 5 MG tablet Take 1 tablet (5 mg) by mouth 2 times daily      QUEtiapine (SEROQUEL) 25 MG tablet Take 12.5 mg by mouth 2 times daily as needed      senna-docusate (SENOKOT-S/PERICOLACE) 8.6-50 MG tablet Take 1 tablet by mouth 2 times daily      tiZANidine (ZANAFLEX) 2 MG capsule Take 2 mg by mouth daily       No current facility-administered medications for this visit.      MED REC REQUIRED  Post Medication Reconciliation Status: discharge medications reconciled, continue medications without change       Allergies     Allergies   Allergen Reactions    Latex Hives and Rash    Anesthetics, Lorna Rash       Review of Systems   A comprehensive review of 14 systems was done. Pertinent findings noted here and in history of present illness. All the rest negative.  Constitutional: Negative.  Negative for fever, chills, he has  activity change, appetite change and fatigue.  Dysphagia as improved   HENT: Negative for congestion and facial swelling.    Eyes: Negative for photophobia, redness and visual disturbance.   Respiratory: Negative for cough and chest tightness.    Cardiovascular: Negative for chest pain, palpitations and has leg swelling.  "  Gastrointestinal: Negative for nausea, diarrhea, constipation, blood in stool and abdominal distention.   Genitourinary: Negative.    Musculoskeletal: Reporting multiple falls  Has difficulty walking and using a wheelchair but walking in the parallel bars has an AFO due to foot drop  Skin: Negative.    Neurological: Negative for dizziness, tremors, syncope, weakness, light-headedness and headaches.  Agitated and some confusion noted  Reporting muscle spasms  Left-sided hemiparesis  Hematological: Does not bruise/bleed easily.   Psychiatric/Behavioral: Negative.  Mood is improved per patient  However staff is reporting increasing voicing of suicidality.  He has had several behavioral issues and gets very agitated and angry.  He told me he does not like being confined to his room he would like to go outside.  He would like to discharge if he cannot move in with his wife  Per staff his wife is no longer with him      Physical Exam   /70   Pulse 87   Temp 98.1  F (36.7  C)   Resp 18   Ht 1.778 m (5' 10\")   Wt 80.7 kg (178 lb)   SpO2 99%   BMI 25.54 kg/m       Constitutional: Oriented to person, place, and time and appears well-developed.   HEENT:  Normocephalic and atraumatic.  Eyes: Conjunctivae and EOM are normal. Pupils are equal, round, and reactive to light. No discharge.  No scleral icterus. Nose normal. Mouth/Throat: Oropharynx is clear and moist. No oropharyngeal exudate.    NECK: Normal range of motion. Neck supple. No JVD present. No tracheal deviation present. No thyromegaly present.   CARDIOVASCULAR: Normal rate, regular rhythm and intact distal pulses.  Exam reveals no gallop and no friction rub.  Systolic murmur present.  PULMONARY: Effort normal and breath sounds normal. No respiratory distress.No Wheezing or rales.  ABDOMEN: Soft. Bowel sounds are normal. No distension and no mass.  There is no tenderness. There is no rebound and no guarding. No HSM.  MUSCULOSKELETAL: Normal range of " motion. Mild kyphosis, no tenderness.  LYMPH NODES: Has no cervical, supraclavicular, axillary and groin adenopathy.   NEUROLOGICAL: Alert and oriented to person, place, and time. No cranial nerve deficit.  Normal muscle tone. Coordination normal.   Left-sided weakness.  Also has an  AFO on the left foot  GENITOURINARY: Deferred exam.  SKIN: Skin is warm and dry. No rash noted. No erythema. No pallor.   EXTREMITIES: No cyanosis, no clubbing, has edema. No Deformity.  PSYCHIATRIC: abNormal mood, affect and behavior.  Agitated and upset      Lab Results     Last Comprehensive Metabolic Panel:  Lab Results   Component Value Date     07/15/2024    POTASSIUM 4.7 07/15/2024    CHLORIDE 103 07/15/2024    CO2 25 07/15/2024    ANIONGAP 12 07/15/2024     (H) 07/15/2024    BUN 20.4 07/15/2024    CR 1.12 07/15/2024    GFRESTIMATED 69 07/15/2024    MARAL 9.6 07/15/2024           Electronically signed by    Desiree Katz MD

## 2024-08-20 ENCOUNTER — LAB REQUISITION (OUTPATIENT)
Dept: LAB | Facility: CLINIC | Age: 74
End: 2024-08-20
Payer: COMMERCIAL

## 2024-08-20 DIAGNOSIS — F32.A DEPRESSION, UNSPECIFIED: ICD-10-CM

## 2024-08-20 DIAGNOSIS — I25.118 ATHEROSCLEROTIC HEART DISEASE OF NATIVE CORONARY ARTERY WITH OTHER FORMS OF ANGINA PECTORIS (H): ICD-10-CM

## 2024-08-20 DIAGNOSIS — R26.2 DIFFICULTY IN WALKING, NOT ELSEWHERE CLASSIFIED: ICD-10-CM

## 2024-08-20 DIAGNOSIS — I69.30 UNSPECIFIED SEQUELAE OF CEREBRAL INFARCTION: ICD-10-CM

## 2024-08-26 LAB
ALBUMIN SERPL BCG-MCNC: 3.7 G/DL (ref 3.5–5.2)
ALP SERPL-CCNC: 167 U/L (ref 40–150)
ALT SERPL W P-5'-P-CCNC: 16 U/L (ref 0–70)
ANION GAP SERPL CALCULATED.3IONS-SCNC: 11 MMOL/L (ref 7–15)
AST SERPL W P-5'-P-CCNC: 18 U/L (ref 0–45)
BILIRUB DIRECT SERPL-MCNC: <0.2 MG/DL (ref 0–0.3)
BILIRUB SERPL-MCNC: 0.2 MG/DL
BUN SERPL-MCNC: 24.4 MG/DL (ref 8–23)
CALCIUM SERPL-MCNC: 9 MG/DL (ref 8.8–10.4)
CHLORIDE SERPL-SCNC: 109 MMOL/L (ref 98–107)
CREAT SERPL-MCNC: 1.31 MG/DL (ref 0.67–1.17)
EGFRCR SERPLBLD CKD-EPI 2021: 57 ML/MIN/1.73M2
GLUCOSE SERPL-MCNC: 112 MG/DL (ref 70–99)
HBA1C MFR BLD: 6.4 %
HCO3 SERPL-SCNC: 24 MMOL/L (ref 22–29)
MAGNESIUM SERPL-MCNC: 2.2 MG/DL (ref 1.7–2.3)
POTASSIUM SERPL-SCNC: 4.5 MMOL/L (ref 3.4–5.3)
PROT SERPL-MCNC: 6.1 G/DL (ref 6.4–8.3)
SODIUM SERPL-SCNC: 144 MMOL/L (ref 135–145)
TSH SERPL DL<=0.005 MIU/L-ACNC: 2.52 UIU/ML (ref 0.3–4.2)
VIT B12 SERPL-MCNC: 340 PG/ML (ref 232–1245)
VIT D+METAB SERPL-MCNC: 29 NG/ML (ref 20–50)

## 2024-08-26 PROCEDURE — 80053 COMPREHEN METABOLIC PANEL: CPT | Mod: ORL | Performed by: FAMILY MEDICINE

## 2024-08-26 PROCEDURE — 82248 BILIRUBIN DIRECT: CPT | Mod: ORL | Performed by: FAMILY MEDICINE

## 2024-08-26 PROCEDURE — P9604 ONE-WAY ALLOW PRORATED TRIP: HCPCS | Mod: ORL | Performed by: FAMILY MEDICINE

## 2024-08-26 PROCEDURE — 82607 VITAMIN B-12: CPT | Mod: ORL | Performed by: FAMILY MEDICINE

## 2024-08-26 PROCEDURE — 83036 HEMOGLOBIN GLYCOSYLATED A1C: CPT | Mod: ORL | Performed by: FAMILY MEDICINE

## 2024-08-26 PROCEDURE — 82306 VITAMIN D 25 HYDROXY: CPT | Mod: ORL | Performed by: FAMILY MEDICINE

## 2024-08-26 PROCEDURE — 36415 COLL VENOUS BLD VENIPUNCTURE: CPT | Mod: ORL | Performed by: FAMILY MEDICINE

## 2024-08-26 PROCEDURE — 84443 ASSAY THYROID STIM HORMONE: CPT | Mod: ORL | Performed by: FAMILY MEDICINE

## 2024-08-26 PROCEDURE — 83735 ASSAY OF MAGNESIUM: CPT | Mod: ORL | Performed by: FAMILY MEDICINE

## 2024-09-04 ENCOUNTER — LAB REQUISITION (OUTPATIENT)
Dept: LAB | Facility: CLINIC | Age: 74
End: 2024-09-04
Payer: COMMERCIAL

## 2024-09-04 ENCOUNTER — NURSING HOME VISIT (OUTPATIENT)
Dept: GERIATRICS | Facility: CLINIC | Age: 74
End: 2024-09-04
Payer: COMMERCIAL

## 2024-09-04 VITALS
HEART RATE: 57 BPM | WEIGHT: 176.8 LBS | SYSTOLIC BLOOD PRESSURE: 143 MMHG | OXYGEN SATURATION: 99 % | TEMPERATURE: 97.8 F | DIASTOLIC BLOOD PRESSURE: 78 MMHG | RESPIRATION RATE: 18 BRPM | BODY MASS INDEX: 25.31 KG/M2 | HEIGHT: 70 IN

## 2024-09-04 DIAGNOSIS — E78.2 MIXED DYSLIPIDEMIA: ICD-10-CM

## 2024-09-04 DIAGNOSIS — R53.83 OTHER FATIGUE: ICD-10-CM

## 2024-09-04 DIAGNOSIS — I25.118 CORONARY ARTERY DISEASE OF NATIVE ARTERY OF NATIVE HEART WITH STABLE ANGINA PECTORIS (H): ICD-10-CM

## 2024-09-04 DIAGNOSIS — N18.32 STAGE 3B CHRONIC KIDNEY DISEASE (H): ICD-10-CM

## 2024-09-04 DIAGNOSIS — U07.1 INFECTION DUE TO 2019 NOVEL CORONAVIRUS: Primary | ICD-10-CM

## 2024-09-04 DIAGNOSIS — Z86.73 HISTORY OF CVA (CEREBROVASCULAR ACCIDENT): ICD-10-CM

## 2024-09-04 DIAGNOSIS — E11.00 TYPE 2 DIABETES MELLITUS WITH HYPEROSMOLARITY WITHOUT COMA, UNSPECIFIED WHETHER LONG TERM INSULIN USE (H): ICD-10-CM

## 2024-09-04 PROCEDURE — 99309 SBSQ NF CARE MODERATE MDM 30: CPT | Performed by: NURSE PRACTITIONER

## 2024-09-04 NOTE — LETTER
9/4/2024      Shaji Markham  [address] [address]        [facility] GERIATRIC SERVICES    Code Status:  DNR   Visit Type:   Chief Complaint   Patient presents with     Nursing Home Acute     Annual Comprehensive Nursing Home     Facility:  [facility] [place] () [[id number]]         HPI: Shaji Markham is a 73 year old male who I am seeing today for acute care visit and annual care visit on LTC.  Patient transferred from the TCU to long-term care.  Pt recently hospitalized with CVA with left-sided hemiparesis.  Past medical history includes CAD with recent CVA, dysphagia, depression with anxiety, DM2 and CKD stage 3b.     Today pt sitting up in wheelchair. Pt tested positive for COVID-19 infection on 8/30/24. He has 1 day left on the antiviral. He is vaccinated. Today he denies any SOB or CP. He  has no cough. His appetite varies. Nursing staff reporting he is drinking.  Hx of CVA with left sided weakness. He continues with AFO splinting to left lower extremity. Pt is non ambulatory. Spasticity post CVA controlled with tizanidine. He continues on gabapentin for neuropathic pain. Underlying anxiety with depression. He continues on Lexapro. He has had some increased behaviors. He continues on Seroquel BID. He is followed by ACP.       Assessment/Plan:     COVID-19  -pt tested positive on 8/30/24.   -Today is his last day of antiviral.   -No shortness of breath or chest pain.  -Follow-up CBC and BMP in the AM.  -Encourage food and fluids.    Coronary artery disease of native artery of native heart with stable angina pectoris (H24)  Acute ischemic right posterior cerebral artery (PCA) stroke (H)  Spasticity  -Continue ASA, clopidogrel and statin.   -left side hemiparesis.   -Continue AFO splinting to left lower extremity.  -continue isosorbide 5 mg BID. (Hold for SBP < 95.)   -continues tizanidine 2 mg every day for spasticity.  -Continue gabapentin BID for neuropathic pain.    Anxiety with  depression  -Continue Lexapro 20 mg every day.   -Seroquel 12.5 mg p.o. twice daily.  -Patient is being followed by ACP.  -No suicidal ideations today.       Active Ambulatory Problems     Diagnosis Date Noted     Abnormal stress test 01/04/2024     Acute ischemic right posterior cerebral artery (PCA) stroke (H) 01/04/2024     Cerebrovascular accident (CVA) due to occlusion of left posterior cerebral artery (H) 07/04/2022     Type 2 diabetes mellitus (H) 02/16/2017     Tinnitus, bilateral 04/04/2022     Stage 3b chronic kidney disease (H) 03/13/2021     Onychomycosis of right great toe 04/05/2023     NAFLD (nonalcoholic fatty liver disease) 10/09/2019     Mixed dyslipidemia 05/05/2011     Elevated lipoprotein(a) 04/06/2022     Coronary artery disease of native artery of native heart with stable angina pectoris (H24) 01/04/2024     Visceral obesity 11/13/2020     Periodontitis 12/06/2023     History of CVA (cerebrovascular accident) 03/10/2024     Hypotension 03/10/2024     Resolved Ambulatory Problems     Diagnosis Date Noted     No Resolved Ambulatory Problems     No Additional Past Medical History     Allergies   Allergen Reactions     Latex Hives and Rash     Anesthetics, Lorna Rash       All Meds and Allergies reviewed in the record at the facility and is the most up-to-date.    Current Outpatient Medications   Medication Sig Dispense Refill     acetaminophen (TYLENOL) 325 MG tablet Take 650 mg by mouth 3 times daily       aspirin 81 MG EC tablet Take 81 mg by mouth daily       atorvastatin (LIPITOR) 80 MG tablet Take 80 mg by mouth at bedtime       clopidogrel (PLAVIX) 75 MG tablet Take 75 mg by mouth daily       escitalopram (LEXAPRO) 20 MG tablet Take 1 tablet (20 mg) by mouth daily       gabapentin (NEURONTIN) 100 MG capsule Take 100 mg by mouth 2 times daily       isosorbide dinitrate (ISORDIL) 5 MG tablet Take 1 tablet (5 mg) by mouth 2 times daily       metFORMIN (GLUCOPHAGE) 500 MG tablet Take 500 mg by  "mouth daily (with breakfast)       QUEtiapine (SEROQUEL) 25 MG tablet Take 25 mg by mouth 2 times daily       senna-docusate (SENOKOT-S/PERICOLACE) 8.6-50 MG tablet Take 1 tablet by mouth 2 times daily       tiZANidine (ZANAFLEX) 2 MG capsule Take 2 mg by mouth daily       No current facility-administered medications for this visit.       REVIEW OF SYSTEMS:   10 point review of systems reviewed and pertinent positives in the HPI.     PHYSICAL EXAMINATION:  Physical Exam     Vital signs: BP (!) 143/78   Pulse 57   Temp 97.8  F (36.6  C)   Resp 18   Ht 1.778 m (5' 10\")   Wt 80.2 kg (176 lb 12.8 oz)   SpO2 99%   BMI 25.37 kg/m    General: Awake, Alert, oriented x3, sitting up in wheelchair, conversant  HEENT: Pink conjunctiva, moist oral mucosa.   NECK: Supple  CVS:  S1  S2, without murmur or gallop.   LUNG: Clear to auscultation, No wheezes, rales or rhonci.  BACK: No kyphosis of the thoracic spine  EXTREMITIES: Left sided hemiparesis, no pedal edema, no calf tenderness. AFO to LLE.   SKIN: Intact.  NEUROLOGIC: Intact, pulses palpable  PSYCHIATRIC: Flat affect.        Labs:  All labs reviewed in the nursing home record and Epic   @  Lab Results   Component Value Date    WBC 8.2 03/07/2024     Lab Results   Component Value Date    RBC 3.80 03/07/2024     Lab Results   Component Value Date    HGB 12.1 03/07/2024     Lab Results   Component Value Date    HCT 37.1 03/07/2024     Lab Results   Component Value Date    MCV 98 03/07/2024     Lab Results   Component Value Date    MCH 31.8 03/07/2024     Lab Results   Component Value Date    MCHC 32.6 03/07/2024     Lab Results   Component Value Date    RDW 13.7 03/07/2024     Lab Results   Component Value Date     03/07/2024        @Last Comprehensive Metabolic Panel:  Sodium   Date Value Ref Range Status   08/26/2024 144 135 - 145 mmol/L Final     Potassium   Date Value Ref Range Status   08/26/2024 4.5 3.4 - 5.3 mmol/L Final     Chloride   Date Value Ref Range " Status   08/26/2024 109 (H) 98 - 107 mmol/L Final     Carbon Dioxide (CO2)   Date Value Ref Range Status   08/26/2024 24 22 - 29 mmol/L Final     Anion Gap   Date Value Ref Range Status   08/26/2024 11 7 - 15 mmol/L Final     Glucose   Date Value Ref Range Status   08/26/2024 112 (H) 70 - 99 mg/dL Final     Urea Nitrogen   Date Value Ref Range Status   08/26/2024 24.4 (H) 8.0 - 23.0 mg/dL Final     Creatinine   Date Value Ref Range Status   08/26/2024 1.31 (H) 0.67 - 1.17 mg/dL Final     GFR Estimate   Date Value Ref Range Status   08/26/2024 57 (L) >60 mL/min/1.73m2 Final     Calcium   Date Value Ref Range Status   08/26/2024 9.0 8.8 - 10.4 mg/dL Final     Comment:     Reference intervals for this test were updated on 7/16/2024 to reflect our healthy population more accurately. There may be differences in the flagging of prior results with similar values performed with this method. Those prior results can be interpreted in the context of the updated reference intervals.       This note has been dictated using voice recognition software. Any grammatical or context distortions are unintentional and inherent to the software    Electronically signed by: Essence Conteh CNP       Sincerely,        Essence Conteh NP

## 2024-09-05 LAB
ANION GAP SERPL CALCULATED.3IONS-SCNC: 9 MMOL/L (ref 7–15)
BUN SERPL-MCNC: 23.3 MG/DL (ref 8–23)
CALCIUM SERPL-MCNC: 8.7 MG/DL (ref 8.8–10.4)
CHLORIDE SERPL-SCNC: 107 MMOL/L (ref 98–107)
CREAT SERPL-MCNC: 1.21 MG/DL (ref 0.67–1.17)
EGFRCR SERPLBLD CKD-EPI 2021: 62 ML/MIN/1.73M2
ERYTHROCYTE [DISTWIDTH] IN BLOOD BY AUTOMATED COUNT: 13 % (ref 10–15)
GLUCOSE SERPL-MCNC: 106 MG/DL (ref 70–99)
HCO3 SERPL-SCNC: 26 MMOL/L (ref 22–29)
HCT VFR BLD AUTO: 36.6 % (ref 40–53)
HGB BLD-MCNC: 11.6 G/DL (ref 13.3–17.7)
MCH RBC QN AUTO: 31.2 PG (ref 26.5–33)
MCHC RBC AUTO-ENTMCNC: 31.7 G/DL (ref 31.5–36.5)
MCV RBC AUTO: 98 FL (ref 78–100)
PLATELET # BLD AUTO: 266 10E3/UL (ref 150–450)
POTASSIUM SERPL-SCNC: 4.6 MMOL/L (ref 3.4–5.3)
RBC # BLD AUTO: 3.72 10E6/UL (ref 4.4–5.9)
SODIUM SERPL-SCNC: 142 MMOL/L (ref 135–145)
WBC # BLD AUTO: 6.4 10E3/UL (ref 4–11)

## 2024-09-05 PROCEDURE — 80048 BASIC METABOLIC PNL TOTAL CA: CPT | Mod: ORL | Performed by: NURSE PRACTITIONER

## 2024-09-05 PROCEDURE — 36415 COLL VENOUS BLD VENIPUNCTURE: CPT | Mod: ORL | Performed by: NURSE PRACTITIONER

## 2024-09-05 PROCEDURE — P9604 ONE-WAY ALLOW PRORATED TRIP: HCPCS | Mod: ORL | Performed by: NURSE PRACTITIONER

## 2024-09-05 PROCEDURE — 85027 COMPLETE CBC AUTOMATED: CPT | Mod: ORL | Performed by: NURSE PRACTITIONER

## 2024-09-05 NOTE — PROGRESS NOTES
[facility] GERIATRIC SERVICES    Code Status:  DNR   Visit Type:   Chief Complaint   Patient presents with    Nursing Home Acute    Annual Comprehensive Nursing Home     Facility:  [facility] [place] () [[id number]]         HPI: Shaji Markham is a 73 year old male who I am seeing today for acute care visit and annual care visit on LTC.  Patient transferred from the TCU to long-term care.  Pt recently hospitalized with CVA with left-sided hemiparesis.  Past medical history includes CAD with recent CVA, dysphagia, depression with anxiety, DM2 and CKD stage 3b.     Today pt sitting up in wheelchair. Pt tested positive for COVID-19 infection on 8/30/24. He has 1 day left on the antiviral. He is vaccinated. Today he denies any SOB or CP. He  has no cough. His appetite varies. Nursing staff reporting he is drinking.  Hx of CVA with left sided weakness. He continues with AFO splinting to left lower extremity. Pt is non ambulatory. Spasticity post CVA controlled with tizanidine. He continues on gabapentin for neuropathic pain. Underlying anxiety with depression. He continues on Lexapro. He has had some increased behaviors. He continues on Seroquel BID. He is followed by ACP.       Assessment/Plan:     COVID-19  -pt tested positive on 8/30/24.   -Today is his last day of antiviral.   -No shortness of breath or chest pain.  -Follow-up CBC and BMP in the AM.  -Encourage food and fluids.    Coronary artery disease of native artery of native heart with stable angina pectoris (H24)  Acute ischemic right posterior cerebral artery (PCA) stroke (H)  Spasticity  -Continue ASA, clopidogrel and statin.   -left side hemiparesis.   -Continue AFO splinting to left lower extremity.  -continue isosorbide 5 mg BID. (Hold for SBP < 95.)   -continues tizanidine 2 mg every day for spasticity.  -Continue gabapentin BID for neuropathic pain.    Anxiety with depression  -Continue Lexapro 20 mg every day.   -Seroquel 12.5 mg p.o. twice  daily.  -Patient is being followed by ACP.  -No suicidal ideations today.       Active Ambulatory Problems     Diagnosis Date Noted    Abnormal stress test 01/04/2024    Acute ischemic right posterior cerebral artery (PCA) stroke (H) 01/04/2024    Cerebrovascular accident (CVA) due to occlusion of left posterior cerebral artery (H) 07/04/2022    Type 2 diabetes mellitus (H) 02/16/2017    Tinnitus, bilateral 04/04/2022    Stage 3b chronic kidney disease (H) 03/13/2021    Onychomycosis of right great toe 04/05/2023    NAFLD (nonalcoholic fatty liver disease) 10/09/2019    Mixed dyslipidemia 05/05/2011    Elevated lipoprotein(a) 04/06/2022    Coronary artery disease of native artery of native heart with stable angina pectoris (H24) 01/04/2024    Visceral obesity 11/13/2020    Periodontitis 12/06/2023    History of CVA (cerebrovascular accident) 03/10/2024    Hypotension 03/10/2024     Resolved Ambulatory Problems     Diagnosis Date Noted    No Resolved Ambulatory Problems     No Additional Past Medical History     Allergies   Allergen Reactions    Latex Hives and Rash    Anesthetics, Lorna Rash       All Meds and Allergies reviewed in the record at the facility and is the most up-to-date.    Current Outpatient Medications   Medication Sig Dispense Refill    acetaminophen (TYLENOL) 325 MG tablet Take 650 mg by mouth 3 times daily      aspirin 81 MG EC tablet Take 81 mg by mouth daily      atorvastatin (LIPITOR) 80 MG tablet Take 80 mg by mouth at bedtime      clopidogrel (PLAVIX) 75 MG tablet Take 75 mg by mouth daily      escitalopram (LEXAPRO) 20 MG tablet Take 1 tablet (20 mg) by mouth daily      gabapentin (NEURONTIN) 100 MG capsule Take 100 mg by mouth 2 times daily      isosorbide dinitrate (ISORDIL) 5 MG tablet Take 1 tablet (5 mg) by mouth 2 times daily      metFORMIN (GLUCOPHAGE) 500 MG tablet Take 500 mg by mouth daily (with breakfast)      QUEtiapine (SEROQUEL) 25 MG tablet Take 25 mg by mouth 2 times daily    "   senna-docusate (SENOKOT-S/PERICOLACE) 8.6-50 MG tablet Take 1 tablet by mouth 2 times daily      tiZANidine (ZANAFLEX) 2 MG capsule Take 2 mg by mouth daily       No current facility-administered medications for this visit.       REVIEW OF SYSTEMS:   10 point review of systems reviewed and pertinent positives in the HPI.     PHYSICAL EXAMINATION:  Physical Exam     Vital signs: BP (!) 143/78   Pulse 57   Temp 97.8  F (36.6  C)   Resp 18   Ht 1.778 m (5' 10\")   Wt 80.2 kg (176 lb 12.8 oz)   SpO2 99%   BMI 25.37 kg/m    General: Awake, Alert, oriented x3, sitting up in wheelchair, conversant  HEENT: Pink conjunctiva, moist oral mucosa.   NECK: Supple  CVS:  S1  S2, without murmur or gallop.   LUNG: Clear to auscultation, No wheezes, rales or rhonci.  BACK: No kyphosis of the thoracic spine  EXTREMITIES: Left sided hemiparesis, no pedal edema, no calf tenderness. AFO to LLE.   SKIN: Intact.  NEUROLOGIC: Intact, pulses palpable  PSYCHIATRIC: Flat affect.        Labs:  All labs reviewed in the nursing home record and Epic   @  Lab Results   Component Value Date    WBC 8.2 03/07/2024     Lab Results   Component Value Date    RBC 3.80 03/07/2024     Lab Results   Component Value Date    HGB 12.1 03/07/2024     Lab Results   Component Value Date    HCT 37.1 03/07/2024     Lab Results   Component Value Date    MCV 98 03/07/2024     Lab Results   Component Value Date    MCH 31.8 03/07/2024     Lab Results   Component Value Date    MCHC 32.6 03/07/2024     Lab Results   Component Value Date    RDW 13.7 03/07/2024     Lab Results   Component Value Date     03/07/2024        @Last Comprehensive Metabolic Panel:  Sodium   Date Value Ref Range Status   08/26/2024 144 135 - 145 mmol/L Final     Potassium   Date Value Ref Range Status   08/26/2024 4.5 3.4 - 5.3 mmol/L Final     Chloride   Date Value Ref Range Status   08/26/2024 109 (H) 98 - 107 mmol/L Final     Carbon Dioxide (CO2)   Date Value Ref Range Status "   08/26/2024 24 22 - 29 mmol/L Final     Anion Gap   Date Value Ref Range Status   08/26/2024 11 7 - 15 mmol/L Final     Glucose   Date Value Ref Range Status   08/26/2024 112 (H) 70 - 99 mg/dL Final     Urea Nitrogen   Date Value Ref Range Status   08/26/2024 24.4 (H) 8.0 - 23.0 mg/dL Final     Creatinine   Date Value Ref Range Status   08/26/2024 1.31 (H) 0.67 - 1.17 mg/dL Final     GFR Estimate   Date Value Ref Range Status   08/26/2024 57 (L) >60 mL/min/1.73m2 Final     Calcium   Date Value Ref Range Status   08/26/2024 9.0 8.8 - 10.4 mg/dL Final     Comment:     Reference intervals for this test were updated on 7/16/2024 to reflect our healthy population more accurately. There may be differences in the flagging of prior results with similar values performed with this method. Those prior results can be interpreted in the context of the updated reference intervals.       This note has been dictated using voice recognition software. Any grammatical or context distortions are unintentional and inherent to the software    Electronically signed by: Essence Conteh, CNP

## 2024-10-09 ENCOUNTER — OFFICE VISIT (OUTPATIENT)
Dept: GERIATRICS | Facility: CLINIC | Age: 74
End: 2024-10-09
Payer: COMMERCIAL

## 2024-10-09 VITALS
OXYGEN SATURATION: 98 % | HEIGHT: 70 IN | TEMPERATURE: 97.8 F | RESPIRATION RATE: 18 BRPM | DIASTOLIC BLOOD PRESSURE: 76 MMHG | WEIGHT: 184 LBS | HEART RATE: 56 BPM | SYSTOLIC BLOOD PRESSURE: 152 MMHG | BODY MASS INDEX: 26.34 KG/M2

## 2024-10-09 DIAGNOSIS — E78.2 MIXED DYSLIPIDEMIA: ICD-10-CM

## 2024-10-09 DIAGNOSIS — R25.2 SPASTICITY AS LATE EFFECT OF CEREBROVASCULAR ACCIDENT (CVA): ICD-10-CM

## 2024-10-09 DIAGNOSIS — R13.10 DYSPHAGIA, UNSPECIFIED TYPE: ICD-10-CM

## 2024-10-09 DIAGNOSIS — R45.1 ANXIETY WITH RESTLESSNESS: ICD-10-CM

## 2024-10-09 DIAGNOSIS — E11.00 TYPE 2 DIABETES MELLITUS WITH HYPEROSMOLARITY WITHOUT COMA, UNSPECIFIED WHETHER LONG TERM INSULIN USE (H): ICD-10-CM

## 2024-10-09 DIAGNOSIS — I25.118 CORONARY ARTERY DISEASE OF NATIVE ARTERY OF NATIVE HEART WITH STABLE ANGINA PECTORIS (H): ICD-10-CM

## 2024-10-09 DIAGNOSIS — I69.398 SPASTICITY AS LATE EFFECT OF CEREBROVASCULAR ACCIDENT (CVA): ICD-10-CM

## 2024-10-09 DIAGNOSIS — F41.9 ANXIETY WITH RESTLESSNESS: ICD-10-CM

## 2024-10-09 DIAGNOSIS — I65.29 STENOSIS OF CAROTID ARTERY, UNSPECIFIED LATERALITY: Primary | ICD-10-CM

## 2024-10-09 DIAGNOSIS — Z86.73 HISTORY OF CVA (CEREBROVASCULAR ACCIDENT): ICD-10-CM

## 2024-10-09 DIAGNOSIS — N18.32 STAGE 3B CHRONIC KIDNEY DISEASE (H): ICD-10-CM

## 2024-10-09 DIAGNOSIS — F34.1 PERSISTENT DEPRESSIVE DISORDER WITH ANXIOUS DISTRESS, CURRENTLY SEVERE: ICD-10-CM

## 2024-10-09 PROCEDURE — 99310 SBSQ NF CARE HIGH MDM 45: CPT | Performed by: NURSE PRACTITIONER

## 2024-10-09 NOTE — PROGRESS NOTES
Preoperative Evaluation  [facility]  [address] [place] [address]  Phone: [phone]  Fax: [phone]  Primary Provider: Essence Conteh NP  Pre-op Performing Provider: Essence Conteh NP  Oct 9, 2024             10/9/2024   Surgical Information   What procedure is being done? Left transcarotid Artery Revascularization   Facility or Hospital where procedure/surgery will be performed: [facility]   Who is doing the procedure / surgery? Dr. Roxana Mccrary   Date of surgery / procedure: 10/17/24   Time of surgery / procedure: 9:46 am   Where do you plan to recover after surgery? at a nursing home      Fax number for surgical facility: [phone]    Assessment & Plan     The proposed surgical procedure is considered HIGH risk.    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     Additional Medication Instructions   - metformin: DO NOT TAKE day of surgery.  -Hold Tizanidine and Seroquel day of surgery.     Recommendation  Patient referred to cardiology for evaluation before surgery. Surgery  approval pending completion of consultation.    Subjective   Vinny is a 74 year old, presenting for above mentioned procedure.     Pre-Op Exam      HPI related to upcoming procedure:     Past medical history includes CAD with recurrent CVA, dysphagia, depression with anxiety, DM2 and CKD stage 3b.  Pt with > 70% stenosis of the right internal carotid artery and > 70% stenosis in the left internal carotid artery. Bilateral external carotid stenosis. Pt with left sided neglect. Hx of CVA with left sided weakness. He continues with AFO splinting to left lower extremity. Pt is non ambulatory. Spasticity post CVA controlled with tizanidine. He continues on gabapentin for neuropathic pain. Underlying anxiety with depression. He continues on Lexapro. Mood disorder.  He continues on Seroquel.                10/9/2024   Pre-Op Questionnaire   Have you ever had a heart attack or stroke? YES  Recurrent CVA; Father with hx of heart attack      Have you ever had surgery on your heart or blood vessels, such as a stent placement, a coronary artery bypass, or surgery on an artery in your head, neck, heart, or legs? YES Coronary angiogram    Do you have chest pain with activity? No      Do you have a history of heart failure? No      Do you currently have a cold, bronchitis or symptoms of other infection? No      Do you have a cough, shortness of breath, or wheezing? No      Do you or anyone in your family have previous history of blood clots? No      Do you or does anyone in your family have a serious bleeding problem such as prolonged bleeding following surgeries or cuts? No      Have you ever had problems with anemia or been told to take iron pills? No      Have you had any abnormal blood loss such as black, tarry or bloody stools? No      Have you ever had a blood transfusion? (!) UNKNOWN      Are you willing to have a blood transfusion if it is medically needed before, during, or after your surgery? Yes      Have you or any of  your relatives ever had problems with anesthesia? No      Do you have sleep apnea, excessive snoring or daytime drowsiness? No      Do you have any artifical heart valves or other implanted medical devices like a pacemaker, defibrillator, or continuous glucose monitor? No      Do you have artificial joints? No      Are you allergic to latex? (!) YES            Health Care Directive  Patient does not have a Health Care Directive or Living Will: Patient states has Advance Directive and will bring in a copy to clinic.    Preoperative Review of    reviewed - no record of controlled substances prescribed.      Patient Active Problem List    Diagnosis Date Noted    History of CVA (cerebrovascular accident) 03/10/2024     Priority: Medium    Hypotension 03/10/2024     Priority: Medium    Abnormal stress test 01/04/2024     Priority: Medium    Acute ischemic right posterior cerebral artery (PCA) stroke (H) 01/04/2024     Priority: Medium    Coronary artery disease of native artery of native heart with stable angina pectoris (H) 01/04/2024     Priority: Medium    Periodontitis 12/06/2023     Priority: Medium    Onychomycosis of right great toe 04/05/2023     Priority: Medium    Cerebrovascular accident (CVA) due to occlusion of left posterior cerebral artery (H) 07/04/2022     Priority: Medium     Formatting of this note might be different from the original.  Right eye temporal visual field blurring began in May 2022, admitted to hospital 7/4/22 d/t dental abscess subacute stroke left occipital lobe noted.   Was already on aspirin 81 mg so dose increased to 325 mg.  Formatting of this note might be different from the original.   Formatting of this note might be different from the original. Right eye temporal visual field blurring began in May 2022, admitted to hospital 7/4/22 d/t dental abscess subacute stroke left occipital lobe noted.   Was already on aspirin 81 mg so dose increased to 325 mg.      Elevated  lipoprotein(a) 04/06/2022     Priority: Medium    Tinnitus, bilateral 04/04/2022     Priority: Medium    Stage 3b chronic kidney disease (H) 03/13/2021     Priority: Medium    Visceral obesity 11/13/2020     Priority: Medium    NAFLD (nonalcoholic fatty liver disease) 10/09/2019     Priority: Medium    Type 2 diabetes mellitus (H) 02/16/2017     Priority: Medium    Mixed dyslipidemia 05/05/2011     Priority: Medium      No past medical history on file.  No past surgical history on file.  Current Outpatient Medications   Medication Sig Dispense Refill    acetaminophen (TYLENOL) 325 MG tablet Take 650 mg by mouth 3 times daily      aspirin 81 MG EC tablet Take 81 mg by mouth daily      atorvastatin (LIPITOR) 80 MG tablet Take 80 mg by mouth at bedtime      clopidogrel (PLAVIX) 75 MG tablet Take 75 mg by mouth daily      escitalopram (LEXAPRO) 20 MG tablet Take 1 tablet (20 mg) by mouth daily      gabapentin (NEURONTIN) 100 MG capsule Take 100 mg by mouth 2 times daily      isosorbide dinitrate (ISORDIL) 5 MG tablet Take 1 tablet (5 mg) by mouth 2 times daily      metFORMIN (GLUCOPHAGE) 500 MG tablet Take 500 mg by mouth daily (with breakfast)      QUEtiapine (SEROQUEL) 25 MG tablet Take 25 mg by mouth 2 times daily      senna-docusate (SENOKOT-S/PERICOLACE) 8.6-50 MG tablet Take 1 tablet by mouth 2 times daily      tiZANidine (ZANAFLEX) 2 MG capsule Take 2 mg by mouth daily         Allergies   Allergen Reactions    Latex Hives and Rash    Anesthetics, Lorna Rash        Social History     Tobacco Use    Smoking status: Unknown    Smokeless tobacco: Not on file   Substance Use Topics    Alcohol use: Not on file     No family history on file.  History   Drug Use Not on file         No fevers or chills. No headache, lightheadedness or dizziness. No SOB, chest pains or palpitations. Appetite is good. No nausea, vomiting, constipation or diarrhea. No dysuria, frequency, burning or pain with urination. Otherwise review of  "systems are negative.       Objective    BP (!) 152/76   Pulse 56   Temp 97.8  F (36.6  C)   Resp 18   Ht 1.778 m (5' 10\")   Wt 83.5 kg (184 lb)   SpO2 98%   BMI 26.40 kg/m     Estimated body mass index is 26.4 kg/m  as calculated from the following:    Height as of this encounter: 1.778 m (5' 10\").    Weight as of this encounter: 83.5 kg (184 lb).  Physical Exam  General: Awake, Alert, oriented x3, sitting up in wheelchair, conversant  HEENT: Pink conjunctiva, moist oral mucosa.   NECK: Supple  CVS:  S1  S2, without murmur or gallop.   LUNG: Clear to auscultation, No wheezes, rales or rhonci.  BACK: No kyphosis of the thoracic spine  EXTREMITIES: Left sided hemiparesis, no pedal edema, no calf tenderness. AFO to LLE.   SKIN: Intact.  NEUROLOGIC: Intact, pulses palpable  PSYCHIATRIC: Flat affect.        Recent Labs   Lab Test 09/05/24  0649 08/26/24  0552 07/15/24  0741   HGB 11.6*  --  13.5     --  266    144 140   POTASSIUM 4.6 4.5 4.7   CR 1.21* 1.31* 1.12   A1C  --  6.4*  --         Diagnostics  Recent Results (from the past 720 hour(s))   CBC with platelets    Collection Time: 10/11/24  6:19 AM   Result Value Ref Range    WBC Count 7.1 4.0 - 11.0 10e3/uL    RBC Count 3.66 (L) 4.40 - 5.90 10e6/uL    Hemoglobin 11.6 (L) 13.3 - 17.7 g/dL    Hematocrit 36.6 (L) 40.0 - 53.0 %     78 - 100 fL    MCH 31.7 26.5 - 33.0 pg    MCHC 31.7 31.5 - 36.5 g/dL    RDW 13.5 10.0 - 15.0 %    Platelet Count 257 150 - 450 10e3/uL   Hepatic function panel    Collection Time: 10/11/24  6:19 AM   Result Value Ref Range    Protein Total 6.5 6.4 - 8.3 g/dL    Albumin 3.8 3.5 - 5.2 g/dL    Bilirubin Total 0.3 <=1.2 mg/dL    Alkaline Phosphatase 121 40 - 150 U/L    AST 18 0 - 45 U/L    ALT 17 0 - 70 U/L    Bilirubin Direct <0.20 0.00 - 0.30 mg/dL   Glucose (OUTREACH)    Collection Time: 10/11/24  6:19 AM   Result Value Ref Range    Glucose 112 (H) 70 - 99 mg/dL   Basic Metabolic Panel No Glucose (OUTREACH)    " Collection Time: 10/11/24  6:19 AM   Result Value Ref Range    Sodium 141 135 - 145 mmol/L    Potassium 4.4 3.4 - 5.3 mmol/L    Chloride 105 98 - 107 mmol/L    Carbon Dioxide (CO2) 27 22 - 29 mmol/L    Anion Gap 9 7 - 15 mmol/L    Urea Nitrogen 21.4 8.0 - 23.0 mg/dL    Creatinine 1.30 (H) 0.67 - 1.17 mg/dL    GFR Estimate 57 (L) >60 mL/min/1.73m2    Calcium 9.1 8.8 - 10.4 mg/dL      EKG pending. (Ordered on 10/10/24) thru [facility].       Echocardiogram: 3/12/24       1. Normal left ventricular chamber size. Normal left ventricular wall thickness. Normal left ventricular systolic function. Estimated left       ventricular ejection fraction is 60-65%.       2. Grossly normal right ventricular size and systolic function.       3. Trileaflet aortic valve. Aortic valve sclerosis without stenosis. Trivial aortic valve regurgitation.       4. Mildly calcified mitral annulus.  Trivial mitral valve regurgitation.       5. Aortic sinus of Valsalva is normal in size (3.7 cm, ZScore = -0.03). Normal indexed ascending aorta dimension (3.3 cm, 1.7 cm/m ).     1/4/24 Coronary Angiography  LEFT MAIN: no significant disease  LAD: calcified and diffusely diseased 80% in the proximal to mid segment; 1st Diagonal is also diffusely diseased in the proximal segment of 70%  RAMUS: diffusely diseased near subtotal occlusion  CX: diffusely diseased small caliber vessel with 90% stenosis  RCA: R Dominant system; distally the vessel trifurcates into RPDA and RPLV and additional marginal branch with severe disease at trifurcation      Revised Cardiac Risk Index (RCRI)  The patient has the following serious cardiovascular risks for perioperative complications:   - Coronary Artery Disease (MI, positive stress test, angina, Qs on EKG) = 1 point   - Cerebrovascular Disease (TIA or CVA) = 1 point   - Diabetes Mellitus (on Insulin) = 1 point     RCRI Interpretation: 3 points: Class IV (high risk - >11% complication rate)        60 minutes spent for this  visit which included reviewing labs, meds, vascular consult as well as face to face time spent with pt and talking to his daughter in regards to risk and benefits of above procedure.     Signed Electronically by: Essence Conteh NP  A copy of this evaluation report is provided to the requesting physician.

## 2024-10-09 NOTE — LETTER
10/9/2024      Shaji Markham  [address] [place] [address]        Preoperative Evaluation  [facility] G[facility]ATR[facility]  [address] [place] [address]  Phone: [phone]  Fax: [phone]  Primary Provider: Essence Conteh NP  Pre-op Performing Provider: Essence Conteh NP  Oct 9, 2024             10/9/2024   Surgical Information   What procedure is being done? Left transcarotid Artery Revascularization   Facility or Hospital where procedure/surgery will be performed: [facility]   Who is doing the procedure / surgery? Dr. Roxana Mccrary   Date of surgery / procedure: 10/17/24   Time of surgery / procedure: 9:46 am   Where do you plan to recover after surgery? at a nursing home      Fax number for surgical facility: [phone]    Assessment & Plan    The proposed surgical procedure is considered HIGH risk.    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     Additional Medication Instructions   - metformin: DO NOT TAKE day of surgery.  -Hold Tizanidine and Seroquel day of surgery.      Recommendation  Patient referred to cardiology for evaluation before surgery. Surgery approval pending completion of consultation.    Subjective  Vinny is a 74 year old, presenting for above mentioned procedure.     Pre-Op Exam      HPI related to upcoming procedure:     Past medical history includes CAD with recurrent CVA, dysphagia, depression with anxiety, DM2 and CKD stage 3b.  Pt with > 70% stenosis of the right internal carotid artery and > 70% stenosis in the left internal carotid artery. Bilateral external carotid stenosis. Pt with left sided neglect. Hx of CVA with left sided weakness. He continues with AFO splinting to left lower extremity. Pt is non ambulatory. Spasticity post CVA controlled with tizanidine. He continues on gabapentin for neuropathic pain. Underlying anxiety with depression. He continues on Lexapro. Mood disorder.  He continues on Seroquel.                10/9/2024   Pre-Op Questionnaire   Have you ever had a heart attack or stroke? YES  Recurrent CVA; Father with hx of heart attack      Have you ever had surgery on your heart or blood vessels, such as a stent placement, a coronary artery bypass, or surgery on an artery in your head, neck, heart, or legs? YES Coronary angiogram    Do you have chest pain with activity? No      Do you have a history of heart failure? No      Do you currently have a cold, bronchitis or symptoms of other infection? No      Do you have a cough, shortness of breath, or wheezing? No      Do you or anyone in your family have previous history of blood clots? No      Do you or does anyone in your family have a serious bleeding problem such as prolonged bleeding following surgeries or cuts? No      Have you ever had problems with anemia or been told to take iron pills? No      Have you had any abnormal blood loss such as black, tarry or bloody stools? No      Have you ever had a blood transfusion? (!) UNKNOWN      Are you willing to have a blood transfusion if it  is medically needed before, during, or after your surgery? Yes      Have you or any of your relatives ever had problems with anesthesia? No      Do you have sleep apnea, excessive snoring or daytime drowsiness? No      Do you have any artifical heart valves or other implanted medical devices like a pacemaker, defibrillator, or continuous glucose monitor? No      Do you have artificial joints? No      Are you allergic to latex? (!) YES            Health Care Directive  Patient does not have a Health Care Directive or Living Will: Patient states has Advance Directive and will bring in a copy to clinic.    Preoperative Review of    reviewed - no record of controlled substances prescribed.      Patient Active Problem List    Diagnosis Date Noted     History of CVA (cerebrovascular accident) 03/10/2024     Priority: Medium     Hypotension 03/10/2024     Priority: Medium     Abnormal stress test 01/04/2024     Priority: Medium     Acute ischemic right posterior cerebral artery (PCA) stroke (H) 01/04/2024     Priority: Medium     Coronary artery disease of native artery of native heart with stable angina pectoris (H) 01/04/2024     Priority: Medium     Periodontitis 12/06/2023     Priority: Medium     Onychomycosis of right great toe 04/05/2023     Priority: Medium     Cerebrovascular accident (CVA) due to occlusion of left posterior cerebral artery (H) 07/04/2022     Priority: Medium     Formatting of this note might be different from the original.  Right eye temporal visual field blurring began in May 2022, admitted to hospital 7/4/22 d/t dental abscess subacute stroke left occipital lobe noted.   Was already on aspirin 81 mg so dose increased to 325 mg.  Formatting of this note might be different from the original.   Formatting of this note might be different from the original. Right eye temporal visual field blurring began in May 2022, admitted to hospital 7/4/22 d/t dental abscess subacute stroke left occipital  lobe noted.   Was already on aspirin 81 mg so dose increased to 325 mg.       Elevated lipoprotein(a) 04/06/2022     Priority: Medium     Tinnitus, bilateral 04/04/2022     Priority: Medium     Stage 3b chronic kidney disease (H) 03/13/2021     Priority: Medium     Visceral obesity 11/13/2020     Priority: Medium     NAFLD (nonalcoholic fatty liver disease) 10/09/2019     Priority: Medium     Type 2 diabetes mellitus (H) 02/16/2017     Priority: Medium     Mixed dyslipidemia 05/05/2011     Priority: Medium      No past medical history on file.  No past surgical history on file.  Current Outpatient Medications   Medication Sig Dispense Refill     acetaminophen (TYLENOL) 325 MG tablet Take 650 mg by mouth 3 times daily       aspirin 81 MG EC tablet Take 81 mg by mouth daily       atorvastatin (LIPITOR) 80 MG tablet Take 80 mg by mouth at bedtime       clopidogrel (PLAVIX) 75 MG tablet Take 75 mg by mouth daily       escitalopram (LEXAPRO) 20 MG tablet Take 1 tablet (20 mg) by mouth daily       gabapentin (NEURONTIN) 100 MG capsule Take 100 mg by mouth 2 times daily       isosorbide dinitrate (ISORDIL) 5 MG tablet Take 1 tablet (5 mg) by mouth 2 times daily       metFORMIN (GLUCOPHAGE) 500 MG tablet Take 500 mg by mouth daily (with breakfast)       QUEtiapine (SEROQUEL) 25 MG tablet Take 25 mg by mouth 2 times daily       senna-docusate (SENOKOT-S/PERICOLACE) 8.6-50 MG tablet Take 1 tablet by mouth 2 times daily       tiZANidine (ZANAFLEX) 2 MG capsule Take 2 mg by mouth daily         Allergies   Allergen Reactions     Latex Hives and Rash     Anesthetics, Lorna Rash        Social History     Tobacco Use     Smoking status: Unknown     Smokeless tobacco: Not on file   Substance Use Topics     Alcohol use: Not on file     No family history on file.  History   Drug Use Not on file         No fevers or chills. No headache, lightheadedness or dizziness. No SOB, chest pains or palpitations. Appetite is good. No nausea,  "vomiting, constipation or diarrhea. No dysuria, frequency, burning or pain with urination. Otherwise review of systems are negative.       Objective   BP (!) 152/76   Pulse 56   Temp 97.8  F (36.6  C)   Resp 18   Ht 1.778 m (5' 10\")   Wt 83.5 kg (184 lb)   SpO2 98%   BMI 26.40 kg/m     Estimated body mass index is 26.4 kg/m  as calculated from the following:    Height as of this encounter: 1.778 m (5' 10\").    Weight as of this encounter: 83.5 kg (184 lb).  Physical Exam  General: Awake, Alert, oriented x3, sitting up in wheelchair, conversant  HEENT: Pink conjunctiva, moist oral mucosa.   NECK: Supple  CVS:  S1  S2, without murmur or gallop.   LUNG: Clear to auscultation, No wheezes, rales or rhonci.  BACK: No kyphosis of the thoracic spine  EXTREMITIES: Left sided hemiparesis, no pedal edema, no calf tenderness. AFO to LLE.   SKIN: Intact.  NEUROLOGIC: Intact, pulses palpable  PSYCHIATRIC: Flat affect.        Recent Labs   Lab Test 09/05/24  0649 08/26/24  0552 07/15/24  0741   HGB 11.6*  --  13.5     --  266    144 140   POTASSIUM 4.6 4.5 4.7   CR 1.21* 1.31* 1.12   A1C  --  6.4*  --         Diagnostics  Recent Results (from the past 720 hour(s))   CBC with platelets    Collection Time: 10/11/24  6:19 AM   Result Value Ref Range    WBC Count 7.1 4.0 - 11.0 10e3/uL    RBC Count 3.66 (L) 4.40 - 5.90 10e6/uL    Hemoglobin 11.6 (L) 13.3 - 17.7 g/dL    Hematocrit 36.6 (L) 40.0 - 53.0 %     78 - 100 fL    MCH 31.7 26.5 - 33.0 pg    MCHC 31.7 31.5 - 36.5 g/dL    RDW 13.5 10.0 - 15.0 %    Platelet Count 257 150 - 450 10e3/uL   Hepatic function panel    Collection Time: 10/11/24  6:19 AM   Result Value Ref Range    Protein Total 6.5 6.4 - 8.3 g/dL    Albumin 3.8 3.5 - 5.2 g/dL    Bilirubin Total 0.3 <=1.2 mg/dL    Alkaline Phosphatase 121 40 - 150 U/L    AST 18 0 - 45 U/L    ALT 17 0 - 70 U/L    Bilirubin Direct <0.20 0.00 - 0.30 mg/dL   Glucose (OUTREACH)    Collection Time: 10/11/24  6:19 AM "   Result Value Ref Range    Glucose 112 (H) 70 - 99 mg/dL   Basic Metabolic Panel No Glucose (OUTREACH)    Collection Time: 10/11/24  6:19 AM   Result Value Ref Range    Sodium 141 135 - 145 mmol/L    Potassium 4.4 3.4 - 5.3 mmol/L    Chloride 105 98 - 107 mmol/L    Carbon Dioxide (CO2) 27 22 - 29 mmol/L    Anion Gap 9 7 - 15 mmol/L    Urea Nitrogen 21.4 8.0 - 23.0 mg/dL    Creatinine 1.30 (H) 0.67 - 1.17 mg/dL    GFR Estimate 57 (L) >60 mL/min/1.73m2    Calcium 9.1 8.8 - 10.4 mg/dL      EKG pending. (Ordered on 10/10/24) thru [facility].       Echocardiogram: 3/12/24       1. Normal left ventricular chamber size. Normal left ventricular wall thickness. Normal left ventricular systolic function. Estimated left       ventricular ejection fraction is 60-65%.       2. Grossly normal right ventricular size and systolic function.       3. Trileaflet aortic valve. Aortic valve sclerosis without stenosis. Trivial aortic valve regurgitation.       4. Mildly calcified mitral annulus.  Trivial mitral valve regurgitation.       5. Aortic sinus of Valsalva is normal in size (3.7 cm, ZScore = -0.03). Normal indexed ascending aorta dimension (3.3 cm, 1.7 cm/m ).     1/4/24 Coronary Angiography  LEFT MAIN: no significant disease  LAD: calcified and diffusely diseased 80% in the proximal to mid segment; 1st Diagonal is also diffusely diseased in the proximal segment of 70%  RAMUS: diffusely diseased near subtotal occlusion  CX: diffusely diseased small caliber vessel with 90% stenosis  RCA: R Dominant system; distally the vessel trifurcates into RPDA and RPLV and additional marginal branch with severe disease at trifurcation      Revised Cardiac Risk Index (RCRI)  The patient has the following serious cardiovascular risks for perioperative complications:   - Coronary Artery Disease (MI, positive stress test, angina, Qs on EKG) = 1 point   - Cerebrovascular Disease (TIA or CVA) = 1 point   - Diabetes Mellitus (on Insulin) = 1 point      RCRI Interpretation: 3 points: Class IV (high risk - >11% complication rate)        60 minutes spent for this visit which included reviewing labs, meds, vascular consult as well as face to face time spent with pt and talking to his daughter in regards to risk and benefits of above procedure.     Signed Electronically by: Essence Conteh NP  A copy of this evaluation report is provided to the requesting physician.        Sincerely,        Essence Conteh NP

## 2024-10-10 ENCOUNTER — LAB REQUISITION (OUTPATIENT)
Dept: LAB | Facility: CLINIC | Age: 74
End: 2024-10-10
Payer: COMMERCIAL

## 2024-10-10 DIAGNOSIS — I69.30 UNSPECIFIED SEQUELAE OF CEREBRAL INFARCTION: ICD-10-CM

## 2024-10-11 LAB
ALBUMIN SERPL BCG-MCNC: 3.8 G/DL (ref 3.5–5.2)
ALP SERPL-CCNC: 121 U/L (ref 40–150)
ALT SERPL W P-5'-P-CCNC: 17 U/L (ref 0–70)
ANION GAP SERPL CALCULATED.3IONS-SCNC: 9 MMOL/L (ref 7–15)
AST SERPL W P-5'-P-CCNC: 18 U/L (ref 0–45)
BILIRUB DIRECT SERPL-MCNC: <0.2 MG/DL (ref 0–0.3)
BILIRUB SERPL-MCNC: 0.3 MG/DL
BUN SERPL-MCNC: 21.4 MG/DL (ref 8–23)
CALCIUM SERPL-MCNC: 9.1 MG/DL (ref 8.8–10.4)
CHLORIDE SERPL-SCNC: 105 MMOL/L (ref 98–107)
CREAT SERPL-MCNC: 1.3 MG/DL (ref 0.67–1.17)
EGFRCR SERPLBLD CKD-EPI 2021: 57 ML/MIN/1.73M2
ERYTHROCYTE [DISTWIDTH] IN BLOOD BY AUTOMATED COUNT: 13.5 % (ref 10–15)
GLUCOSE SERPL-MCNC: 112 MG/DL (ref 70–99)
HCO3 SERPL-SCNC: 27 MMOL/L (ref 22–29)
HCT VFR BLD AUTO: 36.6 % (ref 40–53)
HGB BLD-MCNC: 11.6 G/DL (ref 13.3–17.7)
MCH RBC QN AUTO: 31.7 PG (ref 26.5–33)
MCHC RBC AUTO-ENTMCNC: 31.7 G/DL (ref 31.5–36.5)
MCV RBC AUTO: 100 FL (ref 78–100)
PLATELET # BLD AUTO: 257 10E3/UL (ref 150–450)
POTASSIUM SERPL-SCNC: 4.4 MMOL/L (ref 3.4–5.3)
PROT SERPL-MCNC: 6.5 G/DL (ref 6.4–8.3)
RBC # BLD AUTO: 3.66 10E6/UL (ref 4.4–5.9)
SODIUM SERPL-SCNC: 141 MMOL/L (ref 135–145)
WBC # BLD AUTO: 7.1 10E3/UL (ref 4–11)

## 2024-10-11 PROCEDURE — P9604 ONE-WAY ALLOW PRORATED TRIP: HCPCS | Mod: ORL | Performed by: NURSE PRACTITIONER

## 2024-10-11 PROCEDURE — 82248 BILIRUBIN DIRECT: CPT | Mod: ORL | Performed by: NURSE PRACTITIONER

## 2024-10-11 PROCEDURE — 80053 COMPREHEN METABOLIC PANEL: CPT | Mod: ORL | Performed by: NURSE PRACTITIONER

## 2024-10-11 PROCEDURE — 85027 COMPLETE CBC AUTOMATED: CPT | Mod: ORL | Performed by: NURSE PRACTITIONER

## 2024-10-11 PROCEDURE — 36415 COLL VENOUS BLD VENIPUNCTURE: CPT | Mod: ORL | Performed by: NURSE PRACTITIONER

## 2024-10-22 PROBLEM — N18.9 CKD (CHRONIC KIDNEY DISEASE): Status: ACTIVE | Noted: 2024-10-17

## 2024-10-22 PROBLEM — I77.71: Status: ACTIVE | Noted: 2024-10-17

## 2024-10-22 PROBLEM — I65.29 CAROTID STENOSIS: Status: ACTIVE | Noted: 2024-10-17

## 2024-10-22 PROBLEM — I10 HTN (HYPERTENSION): Status: ACTIVE | Noted: 2024-10-17

## 2024-10-23 ENCOUNTER — NURSING HOME VISIT (OUTPATIENT)
Dept: GERIATRICS | Facility: CLINIC | Age: 74
End: 2024-10-23
Payer: COMMERCIAL

## 2024-10-23 ENCOUNTER — LAB REQUISITION (OUTPATIENT)
Dept: LAB | Facility: CLINIC | Age: 74
End: 2024-10-23
Payer: COMMERCIAL

## 2024-10-23 VITALS
WEIGHT: 181 LBS | HEART RATE: 64 BPM | SYSTOLIC BLOOD PRESSURE: 166 MMHG | DIASTOLIC BLOOD PRESSURE: 73 MMHG | BODY MASS INDEX: 26.81 KG/M2 | RESPIRATION RATE: 18 BRPM | HEIGHT: 69 IN | TEMPERATURE: 97.7 F | OXYGEN SATURATION: 99 %

## 2024-10-23 DIAGNOSIS — R53.83 OTHER FATIGUE: ICD-10-CM

## 2024-10-23 DIAGNOSIS — R13.10 DYSPHAGIA, UNSPECIFIED TYPE: ICD-10-CM

## 2024-10-23 DIAGNOSIS — Z86.73 HISTORY OF CVA (CEREBROVASCULAR ACCIDENT): ICD-10-CM

## 2024-10-23 DIAGNOSIS — I65.29 STENOSIS OF CAROTID ARTERY, UNSPECIFIED LATERALITY: Primary | ICD-10-CM

## 2024-10-23 DIAGNOSIS — Z98.890 STATUS POST CAROTID ENDARTERECTOMY: ICD-10-CM

## 2024-10-23 DIAGNOSIS — R45.1 ANXIETY WITH RESTLESSNESS: ICD-10-CM

## 2024-10-23 DIAGNOSIS — I25.118 CORONARY ARTERY DISEASE OF NATIVE ARTERY OF NATIVE HEART WITH STABLE ANGINA PECTORIS (H): ICD-10-CM

## 2024-10-23 DIAGNOSIS — F41.9 ANXIETY WITH RESTLESSNESS: ICD-10-CM

## 2024-10-23 DIAGNOSIS — E11.00 TYPE 2 DIABETES MELLITUS WITH HYPEROSMOLARITY WITHOUT COMA, UNSPECIFIED WHETHER LONG TERM INSULIN USE (H): ICD-10-CM

## 2024-10-23 DIAGNOSIS — N18.32 STAGE 3B CHRONIC KIDNEY DISEASE (H): ICD-10-CM

## 2024-10-23 PROCEDURE — 99310 SBSQ NF CARE HIGH MDM 45: CPT | Performed by: NURSE PRACTITIONER

## 2024-10-23 RX ORDER — CARBOXYMETHYLCELLULOSE SODIUM 10 MG/ML
1 GEL OPHTHALMIC 2 TIMES DAILY
COMMUNITY
Start: 2024-10-23

## 2024-10-23 RX ORDER — NITROGLYCERIN 0.4 MG/1
0.4 TABLET SUBLINGUAL EVERY 5 MIN PRN
COMMUNITY
Start: 2024-10-23

## 2024-10-23 NOTE — PROGRESS NOTES
[facility] GERIATR[facility] SERVICES    Code Status:  DNR   Visit Type:   Chief Complaint   Patient presents with    [facility] F/U     [employer] 10/17/2024 - 10/22/2024     Facility:  [facility] [place] () [[id number]]         HPI: Shaji Markham is a 74 year old male with past medical history of that includes CAD with recurrent CVA, dysphagia, depression with anxiety, DM2 and CKD stage 3b.  Pt with > 70% stenosis of the right internal carotid artery and > 70% stenosis in the left internal carotid artery. Bilateral external carotid stenosis. Pt with left sided neglect. Hx of CVA with left sided weakness.  Patient underwent left transcarotid artery revascularization for left carotid artery stenosis on 10/17/24 by Dr. Ayon.  Postoperative patient developed some aphasia due to encephalopathy after anesthesia.  CTA was negative for acute stroke and showed a patent stent with expected compression in the midportion of the stent from heavily calcified plaque. Chronic left hemiplegia due to hx of CVA. Post op anemia with relatively small retroperitoneal bleed observed on CTA.  Hemoglobin has stabilized.    Patient has baseline dysphagia prior to surgery and has worked with SLP in the past. Pt failed initial swallow study along with repeat during hospitalization. SLP evaluation recommendations include dysphagia diet (clear liquid diet and/or small puree portions up to 4 oz based on pt/family/MD decision), along with repeat VFSS in 1-2 weeks.      Transitional Care Course: Today patient lying in bed.  CVA with left-sided neglect.  Patient denies any pain in his neck.  No dizziness.  No headache.  Incision dry and intact.  He does have some bruising surrounding.  Continued neurological deficit at baseline.  Patient himself denies any dysphagia.  Nursing staff report occasional coughing with food and fluids.  He is incontinent of bowel.  He says he is emptying his bladder.  Blood pressure stable.  No shortness of breath or  chest pain.      Assessment/Plan:     Carotid stenosis  -S/p left transcarotid artery revascularization for left carotid artery stenosis on 10/17/24.   -CTA post operatively was negative for acute stroke and showed a patent stent with expected compression in the midportion of the stent from heavily calcified plaque. There is a focal intimal flap related to access from the TCAR sheath. This is not hemodynamically significant. No further intervention recommended.   -plan for outpatient follow-up with repeat carotid duplex.  -Dr. Ayon in clinic in 1 month with repeat carotid duplex.  -Continue statin.   -Follow up BMP.     Coronary artery disease of native artery of native heart with stable angina pectoris (H24)  Acute ischemic right posterior cerebral artery (PCA) stroke (H)  Spasticity  -Continue ASA, clopidogrel and statin.   -left side hemiparesis.   -Continue AFO splinting to left lower extremity.  -continue isosorbide 5 mg BID. (Hold for SBP < 95.)   -continues tizanidine 2 mg every day for spasticity.  -Continue gabapentin BID for neuropathic pain.    Post op anemia  -CTA showed relatively small retroperitoneal bleed.  -Follow up CBC.      Anxiety with depression  -Continue Lexapro 20 mg every day.   -Seroquel 12.5 mg p.o. twice daily.  -Patient is being followed by ACP.  -No suicidal ideations today.     Dysphagia   -Pt failed initial swallow study along with repeat during hospitalization.  -dysphagia diet (clear liquid diet and/or small puree portions up to 4 oz   -repeat VFSS in 1-2 weeks.     -ok to PT, OT, and ST to eval and treat.     Active Ambulatory Problems     Diagnosis Date Noted    Abnormal stress test 01/04/2024    Acute ischemic right posterior cerebral artery (PCA) stroke (H) 01/04/2024    Cerebrovascular accident (CVA) due to occlusion of left posterior cerebral artery (H) 07/04/2022    Type 2 diabetes mellitus (H) 02/16/2017    Tinnitus, bilateral 04/04/2022    Stage 3b chronic kidney  disease (H) 03/13/2021    Onychomycosis of right great toe 04/05/2023    NAFLD (nonalcoholic fatty liver disease) 10/09/2019    Mixed dyslipidemia 05/05/2011    Elevated lipoprotein(a) 04/06/2022    Coronary artery disease of native artery of native heart with stable angina pectoris (H) 01/04/2024    Visceral obesity 11/13/2020    Periodontitis 12/06/2023    History of CVA (cerebrovascular accident) 03/10/2024    Hypotension 03/10/2024    Carotid stenosis 10/17/2024    Dissection of left carotid artery (H) 10/17/2024    CKD (chronic kidney disease) 10/17/2024    HTN (hypertension) 10/17/2024     Resolved Ambulatory Problems     Diagnosis Date Noted    No Resolved Ambulatory Problems     No Additional Past Medical History     Allergies   Allergen Reactions    Latex Hives and Rash    Anesthetics, Lorna Rash    Wool Fiber Unknown       All Meds and Allergies reviewed in the record at the facility and is the most up-to-date.    Post Discharge Medication Reconciliation Status: discharge medications reconciled, continue medications without change  Current Outpatient Medications   Medication Sig Dispense Refill    carboxymethylcellulose PF (REFRESH LIQUIGEL) 1 % ophthalmic gel Place 1 drop into both eyes 2 times daily.      nitroGLYcerin (NITROSTAT) 0.4 MG sublingual tablet Place 0.4 mg under the tongue every 5 minutes as needed for chest pain. For chest pain place 1 tablet under the tongue every 5 minutes for 3 doses. If symptoms persist 5 minutes after 1st dose call 911.      acetaminophen (TYLENOL) 325 MG tablet Take 650 mg by mouth 3 times daily      aspirin 81 MG EC tablet Take 81 mg by mouth daily      atorvastatin (LIPITOR) 80 MG tablet Take 80 mg by mouth at bedtime      clopidogrel (PLAVIX) 75 MG tablet Take 75 mg by mouth daily      escitalopram (LEXAPRO) 20 MG tablet Take 1 tablet (20 mg) by mouth daily      gabapentin (NEURONTIN) 100 MG capsule Take 100 mg by mouth 2 times daily      isosorbide dinitrate  "(ISORDIL) 5 MG tablet Take 1 tablet (5 mg) by mouth 2 times daily      metFORMIN (GLUCOPHAGE) 500 MG tablet Take 500 mg by mouth daily (with breakfast)      QUEtiapine (SEROQUEL) 25 MG tablet Take 25 mg by mouth daily as needed.      senna-docusate (SENOKOT-S/PERICOLACE) 8.6-50 MG tablet Take 1 tablet by mouth 2 times daily      tiZANidine (ZANAFLEX) 2 MG capsule Take 2 mg by mouth daily       No current facility-administered medications for this visit.       REVIEW OF SYSTEMS:   10 point review of systems reviewed and pertinent positives in the HPI.     PHYSICAL EXAMINATION:  Physical Exam     Vital signs: BP (!) 166/73   Pulse 64   Temp 97.7  F (36.5  C)   Resp 18   Ht 1.753 m (5' 9\")   Wt 82.1 kg (181 lb)   SpO2 99%   BMI 26.73 kg/m    General: Awake, Alert, oriented x3, lying in bed, follows simple commands, conversant  HEENT:PERRLA, Pink conjunctiva, moist oral mucosa, tongue is midline, facial symmetry.   NECK: Supple. Incision to left neck dry and intact. Bruising noted.   CVS:  S1  S2, without murmur or gallop.   LUNG: Clear to auscultation, No wheezes, rales or rhonci.  BACK: No kyphosis of the thoracic spine  ABDOMEN: Soft, nontender to palpation, with positive bowel sounds  EXTREMITIES: Left sided neglect. no pedal edema, no calf tenderness  SKIN: Warm and dry  NEUROLOGIC: see above.   PSYCHIATRIC:Cognitive impairment noted.       Labs:  All labs reviewed in the nursing home record and [facility]   @  Lab Results   Component Value Date    WBC 7.1 10/11/2024     Lab Results   Component Value Date    RBC 3.66 10/11/2024     Lab Results   Component Value Date    HGB 11.6 10/11/2024     Lab Results   Component Value Date    HCT 36.6 10/11/2024     Lab Results   Component Value Date     10/11/2024     Lab Results   Component Value Date    MCH 31.7 10/11/2024     Lab Results   Component Value Date    MCHC 31.7 10/11/2024     Lab Results   Component Value Date    RDW 13.5 10/11/2024     Lab Results "   Component Value Date     10/11/2024        @Last Comprehensive Metabolic Panel:  Sodium   Date Value Ref Range Status   10/11/2024 141 135 - 145 mmol/L Final     Potassium   Date Value Ref Range Status   10/11/2024 4.4 3.4 - 5.3 mmol/L Final     Chloride   Date Value Ref Range Status   10/11/2024 105 98 - 107 mmol/L Final     Carbon Dioxide (CO2)   Date Value Ref Range Status   10/11/2024 27 22 - 29 mmol/L Final     Anion Gap   Date Value Ref Range Status   10/11/2024 9 7 - 15 mmol/L Final     Glucose   Date Value Ref Range Status   10/11/2024 112 (H) 70 - 99 mg/dL Final     Urea Nitrogen   Date Value Ref Range Status   10/11/2024 21.4 8.0 - 23.0 mg/dL Final     Creatinine   Date Value Ref Range Status   10/11/2024 1.30 (H) 0.67 - 1.17 mg/dL Final     GFR Estimate   Date Value Ref Range Status   10/11/2024 57 (L) >60 mL/min/1.73m2 Final     Calcium   Date Value Ref Range Status   10/11/2024 9.1 8.8 - 10.4 mg/dL Final     Comment:     Reference intervals for this test were updated on 7/16/2024 to reflect our healthy population more accurately. There may be differences in the flagging of prior results with similar values performed with this method. Those prior results can be interpreted in the context of the updated reference intervals.       > 45 minutes spent preparing for this visit which included reviewing hospital records, labs, consults, imaging, medications as well as collaborating with the nursing staff and face-to-face spent with patient.    This note has been dictated using voice recognition software. Any grammatical or context distortions are unintentional and inherent to the software    Electronically signed by: Essence Conteh, CNP

## 2024-10-23 NOTE — LETTER
10/23/2024      Shaji Markham  [address] [place] [address]        [facility]    Code Status:  DNR   Visit Type:   Chief Complaint   Patient presents with     [facility] F/U     [facility] 10/17/2024 - 10/22/2024     Facility:  [facility] () [[id number]]         HPI: Shaji Markham is a 74 year old male with past medical history of that includes CAD with recurrent CVA, dysphagia, depression with anxiety, DM2 and CKD stage 3b.  Pt with > 70% stenosis of the right internal carotid artery and > 70% stenosis in the left internal carotid artery. Bilateral external carotid stenosis. Pt with left sided neglect. Hx of CVA with left sided weakness.  Patient underwent left transcarotid artery revascularization for left carotid artery stenosis on 10/17/24 by Dr. Ayon.  Postoperative patient developed some aphasia due to encephalopathy after anesthesia.  CTA was negative for acute stroke and showed a patent stent with expected compression in the midportion of the stent from heavily calcified plaque. Chronic left hemiplegia due to hx of CVA. Post op anemia with relatively small retroperitoneal bleed observed on CTA.  Hemoglobin has stabilized.    Patient has baseline dysphagia prior to surgery and has worked with SLP in the past. Pt failed initial swallow study along with repeat during hospitalization. SLP evaluation recommendations include dysphagia diet (clear liquid diet and/or small puree portions up to 4 oz based on pt/family/MD decision), along with repeat VFSS in 1-2 weeks.      Transitional Care Course: Today patient lying in bed.  CVA with left-sided neglect.  Patient denies any pain in his neck.  No dizziness.  No headache.  Incision dry and intact.  He does have some bruising surrounding.  Continued neurological deficit at baseline.  Patient himself denies any dysphagia.  Nursing staff report occasional coughing with food and fluids.  He is incontinent of bowel.  He says he is  emptying his bladder.  Blood pressure stable.  No shortness of breath or chest pain.      Assessment/Plan:     Carotid stenosis  -S/p left transcarotid artery revascularization for left carotid artery stenosis on 10/17/24.   -CTA post operatively was negative for acute stroke and showed a patent stent with expected compression in the midportion of the stent from heavily calcified plaque. There is a focal intimal flap related to access from the TCAR sheath. This is not hemodynamically significant. No further intervention recommended.   -plan for outpatient follow-up with repeat carotid duplex.  -Dr. Ayon in clinic in 1 month with repeat carotid duplex.  -Continue statin.   -Follow up BMP.     Coronary artery disease of native artery of native heart with stable angina pectoris (H24)  Acute ischemic right posterior cerebral artery (PCA) stroke (H)  Spasticity  -Continue ASA, clopidogrel and statin.   -left side hemiparesis.   -Continue AFO splinting to left lower extremity.  -continue isosorbide 5 mg BID. (Hold for SBP < 95.)   -continues tizanidine 2 mg every day for spasticity.  -Continue gabapentin BID for neuropathic pain.    Post op anemia  -CTA showed relatively small retroperitoneal bleed.  -Follow up CBC.      Anxiety with depression  -Continue Lexapro 20 mg every day.   -Seroquel 12.5 mg p.o. twice daily.  -Patient is being followed by ACP.  -No suicidal ideations today.     Dysphagia   -Pt failed initial swallow study along with repeat during hospitalization.  -dysphagia diet (clear liquid diet and/or small puree portions up to 4 oz   -repeat VFSS in 1-2 weeks.     -ok to PT, OT, and ST to eval and treat.     Active Ambulatory Problems     Diagnosis Date Noted     Abnormal stress test 01/04/2024     Acute ischemic right posterior cerebral artery (PCA) stroke (H) 01/04/2024     Cerebrovascular accident (CVA) due to occlusion of left posterior cerebral artery (H) 07/04/2022     Type 2 diabetes mellitus (H)  02/16/2017     Tinnitus, bilateral 04/04/2022     Stage 3b chronic kidney disease (H) 03/13/2021     Onychomycosis of right great toe 04/05/2023     NAFLD (nonalcoholic fatty liver disease) 10/09/2019     Mixed dyslipidemia 05/05/2011     Elevated lipoprotein(a) 04/06/2022     Coronary artery disease of native artery of native heart with stable angina pectoris (H) 01/04/2024     Visceral obesity 11/13/2020     Periodontitis 12/06/2023     History of CVA (cerebrovascular accident) 03/10/2024     Hypotension 03/10/2024     Carotid stenosis 10/17/2024     Dissection of left carotid artery (H) 10/17/2024     CKD (chronic kidney disease) 10/17/2024     HTN (hypertension) 10/17/2024     Resolved Ambulatory Problems     Diagnosis Date Noted     No Resolved Ambulatory Problems     No Additional Past Medical History     Allergies   Allergen Reactions     Latex Hives and Rash     Anesthetics, Lorna Rash     Wool Fiber Unknown       All Meds and Allergies reviewed in the record at the facility and is the most up-to-date.    Post Discharge Medication Reconciliation Status: discharge medications reconciled, continue medications without change  Current Outpatient Medications   Medication Sig Dispense Refill     carboxymethylcellulose PF (REFRESH LIQUIGEL) 1 % ophthalmic gel Place 1 drop into both eyes 2 times daily.       nitroGLYcerin (NITROSTAT) 0.4 MG sublingual tablet Place 0.4 mg under the tongue every 5 minutes as needed for chest pain. For chest pain place 1 tablet under the tongue every 5 minutes for 3 doses. If symptoms persist 5 minutes after 1st dose call 911.       acetaminophen (TYLENOL) 325 MG tablet Take 650 mg by mouth 3 times daily       aspirin 81 MG EC tablet Take 81 mg by mouth daily       atorvastatin (LIPITOR) 80 MG tablet Take 80 mg by mouth at bedtime       clopidogrel (PLAVIX) 75 MG tablet Take 75 mg by mouth daily       escitalopram (LEXAPRO) 20 MG tablet Take 1 tablet (20 mg) by mouth daily        "gabapentin (NEURONTIN) 100 MG capsule Take 100 mg by mouth 2 times daily       isosorbide dinitrate (ISORDIL) 5 MG tablet Take 1 tablet (5 mg) by mouth 2 times daily       metFORMIN (GLUCOPHAGE) 500 MG tablet Take 500 mg by mouth daily (with breakfast)       QUEtiapine (SEROQUEL) 25 MG tablet Take 25 mg by mouth daily as needed.       senna-docusate (SENOKOT-S/PERICOLACE) 8.6-50 MG tablet Take 1 tablet by mouth 2 times daily       tiZANidine (ZANAFLEX) 2 MG capsule Take 2 mg by mouth daily       No current facility-administered medications for this visit.       REVIEW OF SYSTEMS:   10 point review of systems reviewed and pertinent positives in the HPI.     PHYSICAL EXAMINATION:  Physical Exam     Vital signs: BP (!) 166/73   Pulse 64   Temp 97.7  F (36.5  C)   Resp 18   Ht 1.753 m (5' 9\")   Wt 82.1 kg (181 lb)   SpO2 99%   BMI 26.73 kg/m    General: Awake, Alert, oriented x3, lying in bed, follows simple commands, conversant  HEENT:PERRLA, Pink conjunctiva, moist oral mucosa, tongue is midline, facial symmetry.   NECK: Supple. Incision to left neck dry and intact. Bruising noted.   CVS:  S1  S2, without murmur or gallop.   LUNG: Clear to auscultation, No wheezes, rales or rhonci.  BACK: No kyphosis of the thoracic spine  ABDOMEN: Soft, nontender to palpation, with positive bowel sounds  EXTREMITIES: Left sided neglect. no pedal edema, no calf tenderness  SKIN: Warm and dry  NEUROLOGIC: see above.   PSYCHIATRIC:Cognitive impairment noted.       Labs:  All labs reviewed in the nursing home record and [facility]   @  Lab Results   Component Value Date    WBC 7.1 10/11/2024     Lab Results   Component Value Date    RBC 3.66 10/11/2024     Lab Results   Component Value Date    HGB 11.6 10/11/2024     Lab Results   Component Value Date    HCT 36.6 10/11/2024     Lab Results   Component Value Date     10/11/2024     Lab Results   Component Value Date    MCH 31.7 10/11/2024     Lab Results   Component Value Date    " MCHC 31.7 10/11/2024     Lab Results   Component Value Date    RDW 13.5 10/11/2024     Lab Results   Component Value Date     10/11/2024        @Last Comprehensive Metabolic Panel:  Sodium   Date Value Ref Range Status   10/11/2024 141 135 - 145 mmol/L Final     Potassium   Date Value Ref Range Status   10/11/2024 4.4 3.4 - 5.3 mmol/L Final     Chloride   Date Value Ref Range Status   10/11/2024 105 98 - 107 mmol/L Final     Carbon Dioxide (CO2)   Date Value Ref Range Status   10/11/2024 27 22 - 29 mmol/L Final     Anion Gap   Date Value Ref Range Status   10/11/2024 9 7 - 15 mmol/L Final     Glucose   Date Value Ref Range Status   10/11/2024 112 (H) 70 - 99 mg/dL Final     Urea Nitrogen   Date Value Ref Range Status   10/11/2024 21.4 8.0 - 23.0 mg/dL Final     Creatinine   Date Value Ref Range Status   10/11/2024 1.30 (H) 0.67 - 1.17 mg/dL Final     GFR Estimate   Date Value Ref Range Status   10/11/2024 57 (L) >60 mL/min/1.73m2 Final     Calcium   Date Value Ref Range Status   10/11/2024 9.1 8.8 - 10.4 mg/dL Final     Comment:     Reference intervals for this test were updated on 7/16/2024 to reflect our healthy population more accurately. There may be differences in the flagging of prior results with similar values performed with this method. Those prior results can be interpreted in the context of the updated reference intervals.       > 45 minutes spent preparing for this visit which included reviewing hospital records, labs, consults, imaging, medications as well as collaborating with the nursing staff and face-to-face spent with patient.    This note has been dictated using voice recognition software. Any grammatical or context distortions are unintentional and inherent to the software    Electronically signed by: Essence Conteh CNP       Sincerely,        Essence Conteh NP

## 2024-10-24 LAB
ANION GAP SERPL CALCULATED.3IONS-SCNC: 11 MMOL/L (ref 7–15)
BUN SERPL-MCNC: 13 MG/DL (ref 8–23)
CALCIUM SERPL-MCNC: 8.8 MG/DL (ref 8.8–10.4)
CHLORIDE SERPL-SCNC: 111 MMOL/L (ref 98–107)
CREAT SERPL-MCNC: 1.11 MG/DL (ref 0.67–1.17)
EGFRCR SERPLBLD CKD-EPI 2021: 69 ML/MIN/1.73M2
ERYTHROCYTE [DISTWIDTH] IN BLOOD BY AUTOMATED COUNT: 14 % (ref 10–15)
GLUCOSE SERPL-MCNC: 117 MG/DL (ref 70–99)
HCO3 SERPL-SCNC: 22 MMOL/L (ref 22–29)
HCT VFR BLD AUTO: 25.7 % (ref 40–53)
HGB BLD-MCNC: 8.2 G/DL (ref 13.3–17.7)
MCH RBC QN AUTO: 31.9 PG (ref 26.5–33)
MCHC RBC AUTO-ENTMCNC: 31.9 G/DL (ref 31.5–36.5)
MCV RBC AUTO: 100 FL (ref 78–100)
PLATELET # BLD AUTO: 212 10E3/UL (ref 150–450)
POTASSIUM SERPL-SCNC: 3.9 MMOL/L (ref 3.4–5.3)
RBC # BLD AUTO: 2.57 10E6/UL (ref 4.4–5.9)
SODIUM SERPL-SCNC: 144 MMOL/L (ref 135–145)
WBC # BLD AUTO: 9.9 10E3/UL (ref 4–11)

## 2024-10-24 PROCEDURE — 85027 COMPLETE CBC AUTOMATED: CPT | Mod: ORL | Performed by: NURSE PRACTITIONER

## 2024-10-24 PROCEDURE — P9604 ONE-WAY ALLOW PRORATED TRIP: HCPCS | Mod: ORL | Performed by: NURSE PRACTITIONER

## 2024-10-24 PROCEDURE — 80048 BASIC METABOLIC PNL TOTAL CA: CPT | Mod: ORL | Performed by: NURSE PRACTITIONER

## 2024-10-24 PROCEDURE — 36415 COLL VENOUS BLD VENIPUNCTURE: CPT | Mod: ORL | Performed by: NURSE PRACTITIONER

## 2024-10-28 ENCOUNTER — LAB REQUISITION (OUTPATIENT)
Dept: LAB | Facility: CLINIC | Age: 74
End: 2024-10-28
Payer: COMMERCIAL

## 2024-10-28 DIAGNOSIS — I95.9 HYPOTENSION, UNSPECIFIED: ICD-10-CM

## 2024-10-30 ENCOUNTER — NURSING HOME VISIT (OUTPATIENT)
Dept: GERIATRICS | Facility: CLINIC | Age: 74
End: 2024-10-30
Payer: COMMERCIAL

## 2024-10-30 VITALS
SYSTOLIC BLOOD PRESSURE: 157 MMHG | BODY MASS INDEX: 26.81 KG/M2 | RESPIRATION RATE: 18 BRPM | HEIGHT: 69 IN | WEIGHT: 181 LBS | TEMPERATURE: 97.7 F | OXYGEN SATURATION: 98 % | HEART RATE: 76 BPM | DIASTOLIC BLOOD PRESSURE: 73 MMHG

## 2024-10-30 DIAGNOSIS — Z98.890 STATUS POST CAROTID ENDARTERECTOMY: ICD-10-CM

## 2024-10-30 DIAGNOSIS — E11.00 TYPE 2 DIABETES MELLITUS WITH HYPEROSMOLARITY WITHOUT COMA, UNSPECIFIED WHETHER LONG TERM INSULIN USE (H): ICD-10-CM

## 2024-10-30 DIAGNOSIS — R13.10 DYSPHAGIA, UNSPECIFIED TYPE: ICD-10-CM

## 2024-10-30 DIAGNOSIS — R45.1 ANXIETY WITH RESTLESSNESS: ICD-10-CM

## 2024-10-30 DIAGNOSIS — N18.32 STAGE 3B CHRONIC KIDNEY DISEASE (H): ICD-10-CM

## 2024-10-30 DIAGNOSIS — R55 VASOVAGAL SYNCOPE: ICD-10-CM

## 2024-10-30 DIAGNOSIS — F41.9 ANXIETY WITH RESTLESSNESS: ICD-10-CM

## 2024-10-30 DIAGNOSIS — I65.29 STENOSIS OF CAROTID ARTERY, UNSPECIFIED LATERALITY: Primary | ICD-10-CM

## 2024-10-30 DIAGNOSIS — I25.118 CORONARY ARTERY DISEASE OF NATIVE ARTERY OF NATIVE HEART WITH STABLE ANGINA PECTORIS (H): ICD-10-CM

## 2024-10-30 DIAGNOSIS — Z86.73 HISTORY OF CVA (CEREBROVASCULAR ACCIDENT): ICD-10-CM

## 2024-10-30 DIAGNOSIS — F34.1 PERSISTENT DEPRESSIVE DISORDER WITH ANXIOUS DISTRESS, CURRENTLY SEVERE: ICD-10-CM

## 2024-10-30 PROCEDURE — 99309 SBSQ NF CARE MODERATE MDM 30: CPT | Performed by: NURSE PRACTITIONER

## 2024-10-30 NOTE — LETTER
" 10/30/2024      Shaji Markham  [address] [place] [address]        [facility]    Code Status:  DNR   Visit Type:   Chief Complaint   Patient presents with     Nursing Home Acute     Facility:  [facility] () [[id number]]         HPI: Shaji Markham is a 74 year old male with past medical history of that includes CAD with recurrent CVA, dysphagia, depression with anxiety, DM2 and CKD stage 3b.  Pt with > 70% stenosis of the right internal carotid artery and > 70% stenosis in the left internal carotid artery. Bilateral external carotid stenosis. Pt with left sided neglect. Hx of CVA with left sided weakness.  Patient underwent left transcarotid artery revascularization for left carotid artery stenosis on 10/17/24 by Dr. Ayon.  Postoperative patient developed some aphasia due to encephalopathy after anesthesia.  CTA was negative for acute stroke and showed a patent stent with expected compression in the midportion of the stent from heavily calcified plaque. Chronic left hemiplegia due to hx of CVA. Post op anemia with relatively small retroperitoneal bleed observed on CTA.  Hemoglobin has stabilized.  Patient has baseline dysphagia prior to surgery and has worked with SLP in the past. Pt failed initial swallow study along with repeat during hospitalization. SLP evaluation recommendations include dysphagia diet (clear liquid diet and/or small puree portions up to 4 oz based on pt/family/MD decision), along with repeat VFSS in 1-2 weeks.      Transitional Care Course: Today patient lying in bed. CVA with left-sided neglect. Recent left carotid left transcarotid artery revascularization. Nursing staff reported recent syncope episode while on toilet. Pt in bathroom on 10/25 and \"almost fainted\". He was placed in bed and fluids encouraged and symptoms improved. Pt did have acute confusion at time of the event. Today he appears per baseline from recent surgery. He has had some " increased cognitive deficit since last surgery. According to records appetite varies. Question if pt drinking enough. Recent BMP with Creatine of 1.1. Look back of bp appears satisfactory today. He is on Isosorbide with hold parameters. No SOB or CP.       Assessment/Plan:     Carotid stenosis  -S/p left transcarotid artery revascularization for left carotid artery stenosis on 10/17/24.   -CTA post operatively was negative for acute stroke and showed a patent stent with expected compression in the midportion of the stent from heavily calcified plaque. There is a focal intimal flap related to access from the TCAR sheath. This is not hemodynamically significant. No further intervention recommended.   -plan for outpatient follow-up with repeat carotid duplex.  -Dr. Ayon in clinic in 1 month with repeat carotid duplex.  -Continue statin.   -Follow up BMP with Creatine 1.1.     Coronary artery disease of native artery of native heart with stable angina pectoris (H24)  Acute ischemic right posterior cerebral artery (PCA) stroke (H)  Spasticity  -Continue ASA, clopidogrel and statin.   -left side hemiparesis.   -Continue AFO splinting to left lower extremity.  -continue isosorbide 5 mg BID. (Hold for SBP < 95.)   -continues tizanidine 2 mg every day for spasticity.  -Continue gabapentin BID for neuropathic pain.    Vasovagal Syncope   -Continues on senna BID.   -Hold parameters on isosorbide.   -Give additional 240 ml QID.   -monitor BP.   -plans to follow up with cardiology for heart monitor.     Post op anemia  -CTA showed relatively small retroperitoneal bleed.  -Follow up CBC with hgb 8.2. Repeat hgb in am.      Anxiety with depression  -Continue Lexapro 20 mg every day.   -Seroquel 12.5 mg p.o. twice daily.  -Patient is being followed by ACP.  -No suicidal ideations today.     Dysphagia   -Pt failed initial swallow study along with repeat during hospitalization.  -dysphagia diet (clear liquid diet and/or small  puree portions up to 4 oz   -repeat VFSS in 1-2 weeks was scheduled for 10/30 however daughter cancelled and is rescheduling.       Active Ambulatory Problems     Diagnosis Date Noted     Abnormal stress test 01/04/2024     Acute ischemic right posterior cerebral artery (PCA) stroke (H) 01/04/2024     Cerebrovascular accident (CVA) due to occlusion of left posterior cerebral artery (H) 07/04/2022     Type 2 diabetes mellitus (H) 02/16/2017     Tinnitus, bilateral 04/04/2022     Stage 3b chronic kidney disease (H) 03/13/2021     Onychomycosis of right great toe 04/05/2023     NAFLD (nonalcoholic fatty liver disease) 10/09/2019     Mixed dyslipidemia 05/05/2011     Elevated lipoprotein(a) 04/06/2022     Coronary artery disease of native artery of native heart with stable angina pectoris (H) 01/04/2024     Visceral obesity 11/13/2020     Periodontitis 12/06/2023     History of CVA (cerebrovascular accident) 03/10/2024     Hypotension 03/10/2024     Carotid stenosis 10/17/2024     Dissection of left carotid artery (H) 10/17/2024     CKD (chronic kidney disease) 10/17/2024     HTN (hypertension) 10/17/2024     Resolved Ambulatory Problems     Diagnosis Date Noted     No Resolved Ambulatory Problems     No Additional Past Medical History     Allergies   Allergen Reactions     Latex Hives and Rash     Anesthetics, Lorna Rash     Wool Fiber Unknown       All Meds and Allergies reviewed in the record at the facility and is the most up-to-date.    Current Outpatient Medications   Medication Sig Dispense Refill     acetaminophen (TYLENOL) 325 MG tablet Take 650 mg by mouth 3 times daily       aspirin 81 MG EC tablet Take 81 mg by mouth daily       atorvastatin (LIPITOR) 80 MG tablet Take 80 mg by mouth at bedtime       carboxymethylcellulose PF (REFRESH LIQUIGEL) 1 % ophthalmic gel Place 1 drop into both eyes 2 times daily.       clopidogrel (PLAVIX) 75 MG tablet Take 75 mg by mouth daily       escitalopram (LEXAPRO) 20 MG  "tablet Take 1 tablet (20 mg) by mouth daily       gabapentin (NEURONTIN) 100 MG capsule Take 100 mg by mouth 2 times daily       isosorbide dinitrate (ISORDIL) 5 MG tablet Take 1 tablet (5 mg) by mouth 2 times daily       metFORMIN (GLUCOPHAGE) 500 MG tablet Take 500 mg by mouth daily (with breakfast)       nitroGLYcerin (NITROSTAT) 0.4 MG sublingual tablet Place 0.4 mg under the tongue every 5 minutes as needed for chest pain. For chest pain place 1 tablet under the tongue every 5 minutes for 3 doses. If symptoms persist 5 minutes after 1st dose call 911.       QUEtiapine (SEROQUEL) 25 MG tablet Take 25 mg by mouth daily as needed.       senna-docusate (SENOKOT-S/PERICOLACE) 8.6-50 MG tablet Take 1 tablet by mouth 2 times daily       tiZANidine (ZANAFLEX) 2 MG capsule Take 2 mg by mouth daily       No current facility-administered medications for this visit.       REVIEW OF SYSTEMS:   10 point review of systems reviewed and pertinent positives in the HPI.     PHYSICAL EXAMINATION:  Physical Exam     Vital signs: BP (!) 157/73   Pulse 76   Temp 97.7  F (36.5  C)   Resp 18   Ht 1.753 m (5' 9\")   Wt 82.1 kg (181 lb)   SpO2 98%   BMI 26.73 kg/m    General: Awake, Alert, oriented x3, lying in bed, follows simple commands, conversant  HEENT:PERRLA, Pink conjunctiva, moist oral mucosa, tongue is midline, facial symmetry.   NECK: Supple. Incision to left neck dry and intact. Bruising noted.   CVS:  S1  S2, without murmur or gallop.   LUNG: Clear to auscultation, No wheezes, rales or rhonci.  BACK: No kyphosis of the thoracic spine  ABDOMEN: Soft, nontender to palpation, with positive bowel sounds  EXTREMITIES: Left sided neglect. no pedal edema, no calf tenderness  SKIN: Warm and dry  NEUROLOGIC: see above.   PSYCHIATRIC:Cognitive impairment noted.       Labs:  All labs reviewed in the nursing home record and [facility]   @  Lab Results   Component Value Date    WBC 7.1 10/11/2024     Lab Results   Component Value Date    " RBC 3.66 10/11/2024     Lab Results   Component Value Date    HGB 11.6 10/11/2024     Lab Results   Component Value Date    HCT 36.6 10/11/2024     Lab Results   Component Value Date     10/11/2024     Lab Results   Component Value Date    MCH 31.7 10/11/2024     Lab Results   Component Value Date    MCHC 31.7 10/11/2024     Lab Results   Component Value Date    RDW 13.5 10/11/2024     Lab Results   Component Value Date     10/11/2024        @Last Comprehensive Metabolic Panel:  Sodium   Date Value Ref Range Status   10/24/2024 144 135 - 145 mmol/L Final     Potassium   Date Value Ref Range Status   10/24/2024 3.9 3.4 - 5.3 mmol/L Final     Chloride   Date Value Ref Range Status   10/24/2024 111 (H) 98 - 107 mmol/L Final     Carbon Dioxide (CO2)   Date Value Ref Range Status   10/24/2024 22 22 - 29 mmol/L Final     Anion Gap   Date Value Ref Range Status   10/24/2024 11 7 - 15 mmol/L Final     Glucose   Date Value Ref Range Status   10/24/2024 117 (H) 70 - 99 mg/dL Final     Urea Nitrogen   Date Value Ref Range Status   10/24/2024 13.0 8.0 - 23.0 mg/dL Final     Creatinine   Date Value Ref Range Status   10/24/2024 1.11 0.67 - 1.17 mg/dL Final     GFR Estimate   Date Value Ref Range Status   10/24/2024 69 >60 mL/min/1.73m2 Final     Calcium   Date Value Ref Range Status   10/24/2024 8.8 8.8 - 10.4 mg/dL Final     Comment:     Reference intervals for this test were updated on 7/16/2024 to reflect our healthy population more accurately. There may be differences in the flagging of prior results with similar values performed with this method. Those prior results can be interpreted in the context of the updated reference intervals.     This note has been dictated using voice recognition software. Any grammatical or context distortions are unintentional and inherent to the software    Electronically signed by: Essence Conteh CNP        Sincerely,        Essence Conteh NP

## 2024-10-31 LAB — HGB BLD-MCNC: 9.4 G/DL (ref 13.3–17.7)

## 2024-10-31 PROCEDURE — 36415 COLL VENOUS BLD VENIPUNCTURE: CPT | Mod: ORL | Performed by: FAMILY MEDICINE

## 2024-10-31 PROCEDURE — 85018 HEMOGLOBIN: CPT | Mod: ORL | Performed by: FAMILY MEDICINE

## 2024-10-31 PROCEDURE — P9604 ONE-WAY ALLOW PRORATED TRIP: HCPCS | Mod: ORL | Performed by: FAMILY MEDICINE

## 2024-10-31 NOTE — PROGRESS NOTES
"[facility] GERIATRIC SERVICES    Code Status:  DNR   Visit Type:   Chief Complaint   Patient presents with     Nursing Home Acute     Facility:  [facility] () [[id number]]         HPI: Shaji Markham is a 74 year old male with past medical history of that includes CAD with recurrent CVA, dysphagia, depression with anxiety, DM2 and CKD stage 3b.  Pt with > 70% stenosis of the right internal carotid artery and > 70% stenosis in the left internal carotid artery. Bilateral external carotid stenosis. Pt with left sided neglect. Hx of CVA with left sided weakness.  Patient underwent left transcarotid artery revascularization for left carotid artery stenosis on 10/17/24 by Dr. Ayon.  Postoperative patient developed some aphasia due to encephalopathy after anesthesia.  CTA was negative for acute stroke and showed a patent stent with expected compression in the midportion of the stent from heavily calcified plaque. Chronic left hemiplegia due to hx of CVA. Post op anemia with relatively small retroperitoneal bleed observed on CTA.  Hemoglobin has stabilized.  Patient has baseline dysphagia prior to surgery and has worked with SLP in the past. Pt failed initial swallow study along with repeat during hospitalization. SLP evaluation recommendations include dysphagia diet (clear liquid diet and/or small puree portions up to 4 oz based on pt/family/MD decision), along with repeat VFSS in 1-2 weeks.      Transitional Care Course: Today patient lying in bed. CVA with left-sided neglect. Recent left carotid left transcarotid artery revascularization. Nursing staff reported recent syncope episode while on toilet. Pt in bathroom on 10/25 and \"almost fainted\". He was placed in bed and fluids encouraged and symptoms improved. Pt did have acute confusion at time of the event. Today he appears per baseline from recent surgery. He has had some increased cognitive deficit since last surgery. According to records appetite " varies. Question if pt drinking enough. Recent BMP with Creatine of 1.1. Look back of bp appears satisfactory today. He is on Isosorbide with hold parameters. No SOB or CP.       Assessment/Plan:     Carotid stenosis  -S/p left transcarotid artery revascularization for left carotid artery stenosis on 10/17/24.   -CTA post operatively was negative for acute stroke and showed a patent stent with expected compression in the midportion of the stent from heavily calcified plaque. There is a focal intimal flap related to access from the TCAR sheath. This is not hemodynamically significant. No further intervention recommended.   -plan for outpatient follow-up with repeat carotid duplex.  -Dr. Ayon in clinic in 1 month with repeat carotid duplex.  -Continue statin.   -Follow up BMP with Creatine 1.1.     Coronary artery disease of native artery of native heart with stable angina pectoris (H24)  Acute ischemic right posterior cerebral artery (PCA) stroke (H)  Spasticity  -Continue ASA, clopidogrel and statin.   -left side hemiparesis.   -Continue AFO splinting to left lower extremity.  -continue isosorbide 5 mg BID. (Hold for SBP < 95.)   -continues tizanidine 2 mg every day for spasticity.  -Continue gabapentin BID for neuropathic pain.    Vasovagal Syncope   -Continues on senna BID.   -Hold parameters on isosorbide.   -Give additional 240 ml QID.   -monitor BP.   -plans to follow up with cardiology for heart monitor.     Post op anemia  -CTA showed relatively small retroperitoneal bleed.  -Follow up CBC with hgb 8.2. Repeat hgb in am.      Anxiety with depression  -Continue Lexapro 20 mg every day.   -Seroquel 12.5 mg p.o. twice daily.  -Patient is being followed by ACP.  -No suicidal ideations today.     Dysphagia   -Pt failed initial swallow study along with repeat during hospitalization.  -dysphagia diet (clear liquid diet and/or small puree portions up to 4 oz   -repeat VFSS in 1-2 weeks was scheduled for 10/30  however daughter cancelled and is rescheduling.       Active Ambulatory Problems     Diagnosis Date Noted     Abnormal stress test 01/04/2024     Acute ischemic right posterior cerebral artery (PCA) stroke (H) 01/04/2024     Cerebrovascular accident (CVA) due to occlusion of left posterior cerebral artery (H) 07/04/2022     Type 2 diabetes mellitus (H) 02/16/2017     Tinnitus, bilateral 04/04/2022     Stage 3b chronic kidney disease (H) 03/13/2021     Onychomycosis of right great toe 04/05/2023     NAFLD (nonalcoholic fatty liver disease) 10/09/2019     Mixed dyslipidemia 05/05/2011     Elevated lipoprotein(a) 04/06/2022     Coronary artery disease of native artery of native heart with stable angina pectoris (H) 01/04/2024     Visceral obesity 11/13/2020     Periodontitis 12/06/2023     History of CVA (cerebrovascular accident) 03/10/2024     Hypotension 03/10/2024     Carotid stenosis 10/17/2024     Dissection of left carotid artery (H) 10/17/2024     CKD (chronic kidney disease) 10/17/2024     HTN (hypertension) 10/17/2024     Resolved Ambulatory Problems     Diagnosis Date Noted     No Resolved Ambulatory Problems     No Additional Past Medical History     Allergies   Allergen Reactions     Latex Hives and Rash     Anesthetics, Lorna Rash     Wool Fiber Unknown       All Meds and Allergies reviewed in the record at the facility and is the most up-to-date.    Current Outpatient Medications   Medication Sig Dispense Refill     acetaminophen (TYLENOL) 325 MG tablet Take 650 mg by mouth 3 times daily       aspirin 81 MG EC tablet Take 81 mg by mouth daily       atorvastatin (LIPITOR) 80 MG tablet Take 80 mg by mouth at bedtime       carboxymethylcellulose PF (REFRESH LIQUIGEL) 1 % ophthalmic gel Place 1 drop into both eyes 2 times daily.       clopidogrel (PLAVIX) 75 MG tablet Take 75 mg by mouth daily       escitalopram (LEXAPRO) 20 MG tablet Take 1 tablet (20 mg) by mouth daily       gabapentin (NEURONTIN) 100 MG  "capsule Take 100 mg by mouth 2 times daily       isosorbide dinitrate (ISORDIL) 5 MG tablet Take 1 tablet (5 mg) by mouth 2 times daily       metFORMIN (GLUCOPHAGE) 500 MG tablet Take 500 mg by mouth daily (with breakfast)       nitroGLYcerin (NITROSTAT) 0.4 MG sublingual tablet Place 0.4 mg under the tongue every 5 minutes as needed for chest pain. For chest pain place 1 tablet under the tongue every 5 minutes for 3 doses. If symptoms persist 5 minutes after 1st dose call 911.       QUEtiapine (SEROQUEL) 25 MG tablet Take 25 mg by mouth daily as needed.       senna-docusate (SENOKOT-S/PERICOLACE) 8.6-50 MG tablet Take 1 tablet by mouth 2 times daily       tiZANidine (ZANAFLEX) 2 MG capsule Take 2 mg by mouth daily       No current facility-administered medications for this visit.       REVIEW OF SYSTEMS:   10 point review of systems reviewed and pertinent positives in the HPI.     PHYSICAL EXAMINATION:  Physical Exam     Vital signs: BP (!) 157/73   Pulse 76   Temp 97.7  F (36.5  C)   Resp 18   Ht 1.753 m (5' 9\")   Wt 82.1 kg (181 lb)   SpO2 98%   BMI 26.73 kg/m    General: Awake, Alert, oriented x3, lying in bed, follows simple commands, conversant  HEENT:PERRLA, Pink conjunctiva, moist oral mucosa, tongue is midline, facial symmetry.   NECK: Supple. Incision to left neck dry and intact. Bruising noted.   CVS:  S1  S2, without murmur or gallop.   LUNG: Clear to auscultation, No wheezes, rales or rhonci.  BACK: No kyphosis of the thoracic spine  ABDOMEN: Soft, nontender to palpation, with positive bowel sounds  EXTREMITIES: Left sided neglect. no pedal edema, no calf tenderness  SKIN: Warm and dry  NEUROLOGIC: see above.   PSYCHIATRIC:Cognitive impairment noted.       Labs:  All labs reviewed in the nursing home record and [employer]   @  Lab Results   Component Value Date    WBC 7.1 10/11/2024     Lab Results   Component Value Date    RBC 3.66 10/11/2024     Lab Results   Component Value Date    HGB 11.6 " 10/11/2024     Lab Results   Component Value Date    HCT 36.6 10/11/2024     Lab Results   Component Value Date     10/11/2024     Lab Results   Component Value Date    MCH 31.7 10/11/2024     Lab Results   Component Value Date    MCHC 31.7 10/11/2024     Lab Results   Component Value Date    RDW 13.5 10/11/2024     Lab Results   Component Value Date     10/11/2024        @Last Comprehensive Metabolic Panel:  Sodium   Date Value Ref Range Status   10/24/2024 144 135 - 145 mmol/L Final     Potassium   Date Value Ref Range Status   10/24/2024 3.9 3.4 - 5.3 mmol/L Final     Chloride   Date Value Ref Range Status   10/24/2024 111 (H) 98 - 107 mmol/L Final     Carbon Dioxide (CO2)   Date Value Ref Range Status   10/24/2024 22 22 - 29 mmol/L Final     Anion Gap   Date Value Ref Range Status   10/24/2024 11 7 - 15 mmol/L Final     Glucose   Date Value Ref Range Status   10/24/2024 117 (H) 70 - 99 mg/dL Final     Urea Nitrogen   Date Value Ref Range Status   10/24/2024 13.0 8.0 - 23.0 mg/dL Final     Creatinine   Date Value Ref Range Status   10/24/2024 1.11 0.67 - 1.17 mg/dL Final     GFR Estimate   Date Value Ref Range Status   10/24/2024 69 >60 mL/min/1.73m2 Final     Calcium   Date Value Ref Range Status   10/24/2024 8.8 8.8 - 10.4 mg/dL Final     Comment:     Reference intervals for this test were updated on 7/16/2024 to reflect our healthy population more accurately. There may be differences in the flagging of prior results with similar values performed with this method. Those prior results can be interpreted in the context of the updated reference intervals.     This note has been dictated using voice recognition software. Any grammatical or context distortions are unintentional and inherent to the software    Electronically signed by: Essence Conteh CNP

## 2024-11-26 ENCOUNTER — NURSING HOME VISIT (OUTPATIENT)
Dept: GERIATRICS | Facility: CLINIC | Age: 74
End: 2024-11-26
Payer: COMMERCIAL

## 2024-11-26 VITALS
TEMPERATURE: 97.2 F | WEIGHT: 176 LBS | RESPIRATION RATE: 18 BRPM | HEART RATE: 64 BPM | SYSTOLIC BLOOD PRESSURE: 162 MMHG | DIASTOLIC BLOOD PRESSURE: 77 MMHG | BODY MASS INDEX: 26.07 KG/M2 | OXYGEN SATURATION: 99 % | HEIGHT: 69 IN

## 2024-11-26 DIAGNOSIS — Z86.73 HISTORY OF CVA (CEREBROVASCULAR ACCIDENT): ICD-10-CM

## 2024-11-26 DIAGNOSIS — I69.398 SPASTICITY AS LATE EFFECT OF CEREBROVASCULAR ACCIDENT (CVA): ICD-10-CM

## 2024-11-26 DIAGNOSIS — E11.00 TYPE 2 DIABETES MELLITUS WITH HYPEROSMOLARITY WITHOUT COMA, UNSPECIFIED WHETHER LONG TERM INSULIN USE (H): ICD-10-CM

## 2024-11-26 DIAGNOSIS — E78.2 MIXED DYSLIPIDEMIA: ICD-10-CM

## 2024-11-26 DIAGNOSIS — Z98.890 STATUS POST CAROTID ENDARTERECTOMY: ICD-10-CM

## 2024-11-26 DIAGNOSIS — I25.118 CORONARY ARTERY DISEASE OF NATIVE ARTERY OF NATIVE HEART WITH STABLE ANGINA PECTORIS (H): ICD-10-CM

## 2024-11-26 DIAGNOSIS — N18.32 STAGE 3B CHRONIC KIDNEY DISEASE (H): ICD-10-CM

## 2024-11-26 DIAGNOSIS — R13.10 DYSPHAGIA, UNSPECIFIED TYPE: ICD-10-CM

## 2024-11-26 DIAGNOSIS — F34.1 PERSISTENT DEPRESSIVE DISORDER WITH ANXIOUS DISTRESS, CURRENTLY SEVERE: ICD-10-CM

## 2024-11-26 DIAGNOSIS — I65.29 STENOSIS OF CAROTID ARTERY, UNSPECIFIED LATERALITY: Primary | ICD-10-CM

## 2024-11-26 DIAGNOSIS — R25.2 SPASTICITY AS LATE EFFECT OF CEREBROVASCULAR ACCIDENT (CVA): ICD-10-CM

## 2024-11-26 PROCEDURE — 99309 SBSQ NF CARE MODERATE MDM 30: CPT | Performed by: FAMILY MEDICINE

## 2024-11-26 NOTE — LETTER
11/26/2024      Shaji GOLDBERG Shahrzad  [address] [place] [address]        [facility] GERIATRIC SERVICES       Patient Shaji Markham  MRN: [id number]        Reason for Visit     Chief Complaint   Patient presents with     longterm Regulatory       Code Status     DNR only    Assessment     L carotid A stenosis s/p left carotid artery revascularization on 10/17/2024 [TCAR]  Outpt follow-up with vascular as scheduled.    Multivessel CAD-patient is felt to be a poor surgical candidate  Currently on medical management  Continue with aspirin Isordil as well as a statin and plavix.  Ability to tolerate aggressive treatment limited by hypotension  Follow-up with cardiology was done and no change in treatment plan done  He is chest pain-free.  However he did have a recent episode of near syncope which did resolve.    Prior history of CVA post angiogram-right posterior cerebral artery  Etiology felt to be periprocedural  Has left sided hemiparesis.  He is on aspirin Plavix and statins  Continue Zanaflex due to ongoing concerns about muscle spasms   he is also on gabapentin and Tylenol  At baseline remains wheelchair-bound  However reports improvement    Hyperlipidemia continue with his statins    Hypertension complicated by low blood pressures  He is no longer on any antihypertensives  Monitor blood pressures    Diabetes type 2  Discontinue blood sugar checks  He is currently getting blood sugar checks twice daily with no insulin sliding scale   most of the checks are under 180.   on metformin 500 mg daily  Recheck A1c in a few weeks  Goal would be to get his A1c under 6.5    CKD 3b-recheck BMP as ordered  Last creatinine stable at 1.1  Monitor BMPs    Prior history of tobacco use currently abstaining    Mood disorder/adjustment reaction  Underlying history of vascular dementia with increasing agitation  Currently on Lexapro and scheduled Seroquel.  Has not been voicing negative ideation or suicidality recently but  continues to have difficulty adjusting to his new environment.  Continue to monitor mood and behaviors    Chronic dysphagia  Was evaluated by SLP and recommendation is to continue with his modified diet on clear liquid with puréed portions-     diabetic neuropathy   increase gabapentin to 100 twice daily\  Hoping pain is not keeping him in distress.  He denies any concerns with that    Anemia-monitor Hg; last Hg stable at 8.2    Generalized weakness  At baseline due to decline he has become wheelchair-bound he has a history of recurrent falls  Unfortunately remains bedbound and wheelchair-bound at baseline.  He is working with therapy      History     Patient is a very pleasant 74 year old male who is a resident of long-term care  Patient was initially admitted to the hospital with multivessel CAD  He has a history of CVA post angiogram with resultant left-sided weakness  [place] to be a poor surgical candidate and discharged on medical management  Course complicated by hypotension  Was in the TCU and now moved to long-term care  He was readmitted to the hospital with history of bilateral carotid artery stenosis.  He underwent TCAR on 10/17/2024  Postoperatively had some encephalopathy concerns which have resolved.  Also has some concerns about dysphagia which are chronic and remains on a modified diet which is somewhat of a distress to him  Mood remains emotionally labile  Reports he is having difficulty adjusting to his new current ground realities where he is no longer independent      Past Medical & Surgical History   Diabetes  Multivessel CAD  CKD      Past Social History     Reviewed,  has a past history of smoking denies alcohol use    Family History     Reviewed, and family history is not on file.    Medication List     Current Outpatient Medications   Medication Sig Dispense Refill     acetaminophen (TYLENOL) 325 MG tablet Take 650 mg by mouth 3 times daily       aspirin 81 MG EC tablet Take 81 mg by mouth  daily       atorvastatin (LIPITOR) 80 MG tablet Take 80 mg by mouth at bedtime       carboxymethylcellulose PF (REFRESH LIQUIGEL) 1 % ophthalmic gel Place 1 drop into both eyes 2 times daily.       clopidogrel (PLAVIX) 75 MG tablet Take 75 mg by mouth daily       escitalopram (LEXAPRO) 20 MG tablet Take 1 tablet (20 mg) by mouth daily       gabapentin (NEURONTIN) 100 MG capsule Take 100 mg by mouth 2 times daily       isosorbide dinitrate (ISORDIL) 5 MG tablet Take 1 tablet (5 mg) by mouth 2 times daily       metFORMIN (GLUCOPHAGE) 500 MG tablet Take 500 mg by mouth daily (with breakfast)       nitroGLYcerin (NITROSTAT) 0.4 MG sublingual tablet Place 0.4 mg under the tongue every 5 minutes as needed for chest pain. For chest pain place 1 tablet under the tongue every 5 minutes for 3 doses. If symptoms persist 5 minutes after 1st dose call 911.       QUEtiapine (SEROQUEL) 25 MG tablet Take 25 mg by mouth daily as needed.       senna-docusate (SENOKOT-S/PERICOLACE) 8.6-50 MG tablet Take 1 tablet by mouth 2 times daily       tiZANidine (ZANAFLEX) 2 MG capsule Take 2 mg by mouth daily       No current facility-administered medications for this visit.      MED REC REQUIRED  Post Medication Reconciliation Status: discharge medications reconciled, continue medications without change       Allergies     Allergies   Allergen Reactions     Latex Hives and Rash     Anesthetics, Lorna Rash     Wool Fiber Unknown       Review of Systems   A comprehensive review of 14 systems was done. Pertinent findings noted here and in history of present illness. All the rest negative.  Constitutional: Negative.  Negative for fever, chills, he has  activity change, appetite change and fatigue.  Dysphagia as improved   HENT: Negative for congestion and facial swelling.    Eyes: Negative for photophobia, redness and visual disturbance.   Respiratory: Negative for cough and chest tightness.    Cardiovascular: Negative for chest pain, palpitations  "and has leg swelling.   Gastrointestinal: Negative for nausea, diarrhea, constipation, blood in stool and abdominal distention.   Genitourinary: Negative.    Musculoskeletal: Reporting multiple falls  Has difficulty walking and using a wheelchair but walking in the parallel bars has an AFO due to foot drop  Skin: Negative.    Neurological: Negative for dizziness, tremors, syncope, weakness, light-headedness and headaches.  Agitated and some confusion noted  Reporting muscle spasms  Left-sided hemiparesis  Hematological: Does not bruise/bleed easily.   Psychiatric/Behavioral: Negative.  Mood is improved per patient  He has had several behavioral issues and gets very agitated and angry.        Physical Exam   BP (!) 162/77   Pulse 64   Temp 97.2  F (36.2  C)   Resp 18   Ht 1.753 m (5' 9\")   Wt 79.8 kg (176 lb)   SpO2 99%   BMI 25.99 kg/m       Constitutional: Oriented to person, place, and time and appears well-developed.   HEENT:  Normocephalic and atraumatic.  Eyes: Conjunctivae and EOM are normal. Pupils are equal, round, and reactive to light. No discharge.  No scleral icterus. Nose normal. Mouth/Throat: Oropharynx is clear and moist. No oropharyngeal exudate.    NECK: Normal range of motion. Neck supple. No JVD present. No tracheal deviation present. No thyromegaly present.   Surgical incision on the left side is healed  CARDIOVASCULAR: Normal rate, regular rhythm and intact distal pulses.  Exam reveals no gallop and no friction rub.  Systolic murmur present.  PULMONARY: Effort normal and breath sounds normal. No respiratory distress.No Wheezing or rales.  ABDOMEN: Soft. Bowel sounds are normal. No distension and no mass.  There is no tenderness. There is no rebound and no guarding. No HSM.  MUSCULOSKELETAL: Normal range of motion. Mild kyphosis, no tenderness.  LYMPH NODES: Has no cervical, supraclavicular, axillary and groin adenopathy.   NEUROLOGICAL: Alert and oriented to person, place, and time. No " cranial nerve deficit.  Normal muscle tone. Coordination normal.   Left-sided weakness.  Also has an  AFO on the left foot  GENITOURINARY: Deferred exam.  SKIN: Skin is warm and dry. No rash noted. No erythema. No pallor.   EXTREMITIES: No cyanosis, no clubbing, has edema. No Deformity.  PSYCHIATRIC: abNormal mood, affect and behavior.  Reports depression and difficulty coping with his current reality      Lab Results     Last Comprehensive Metabolic Panel:  Lab Results   Component Value Date     10/24/2024    POTASSIUM 3.9 10/24/2024    CHLORIDE 111 (H) 10/24/2024    CO2 22 10/24/2024    ANIONGAP 11 10/24/2024     (H) 10/24/2024    BUN 13.0 10/24/2024    CR 1.11 10/24/2024    GFRESTIMATED 69 10/24/2024    MARAL 8.8 10/24/2024           Electronically signed by    Desiree Katz MD                            Sincerely,        GAURI Fitzgerald

## 2024-11-26 NOTE — PROGRESS NOTES
[facility] GERIATRIC SERVICES       Patient Shaji Markham  MRN: [id number]        Reason for Visit     Chief Complaint   Patient presents with    retirement Regulatory       Code Status     DNR only    Assessment     L carotid A stenosis s/p left carotid artery revascularization on 10/17/2024 [TCAR]  Outpt follow-up with vascular as scheduled.    Multivessel CAD-patient is felt to be a poor surgical candidate  Currently on medical management  Continue with aspirin Isordil as well as a statin and plavix.  Ability to tolerate aggressive treatment limited by hypotension  Follow-up with cardiology was done and no change in treatment plan done  He is chest pain-free.  However he did have a recent episode of near syncope which did resolve.    Prior history of CVA post angiogram-right posterior cerebral artery  Etiology felt to be periprocedural  Has left sided hemiparesis.  He is on aspirin Plavix and statins  Continue Zanaflex due to ongoing concerns about muscle spasms   he is also on gabapentin and Tylenol  At baseline remains wheelchair-bound  However reports improvement    Hyperlipidemia continue with his statins    Hypertension complicated by low blood pressures  He is no longer on any antihypertensives  Monitor blood pressures    Diabetes type 2  Discontinue blood sugar checks  He is currently getting blood sugar checks twice daily with no insulin sliding scale   most of the checks are under 180.   on metformin 500 mg daily  Recheck A1c in a few weeks  Goal would be to get his A1c under 6.5    CKD 3b-recheck BMP as ordered  Last creatinine stable at 1.1  Monitor BMPs    Prior history of tobacco use currently abstaining    Mood disorder/adjustment reaction  Underlying history of vascular dementia with increasing agitation  Currently on Lexapro and scheduled Seroquel.  Has not been voicing negative ideation or suicidality recently but continues to have difficulty adjusting to his new environment.  Continue to  monitor mood and behaviors    Chronic dysphagia  Was evaluated by SLP and recommendation is to continue with his modified diet on clear liquid with puréed portions-     diabetic neuropathy   increase gabapentin to 100 twice daily\  Hoping pain is not keeping him in distress.  He denies any concerns with that    Anemia-monitor Hg; last Hg stable at 8.2    Generalized weakness  At baseline due to decline he has become wheelchair-bound he has a history of recurrent falls  Unfortunately remains bedbound and wheelchair-bound at baseline.  He is working with therapy      History     Patient is a very pleasant 74 year old male who is a resident of long-term care  Patient was initially admitted to the hospital with multivessel CAD  He has a history of CVA post angiogram with resultant left-sided weakness  [place] to be a poor surgical candidate and discharged on medical management  Course complicated by hypotension  Was in the TCU and now moved to long-term care  He was readmitted to the hospital with history of bilateral carotid artery stenosis.  He underwent TCAR on 10/17/2024  Postoperatively had some encephalopathy concerns which have resolved.  Also has some concerns about dysphagia which are chronic and remains on a modified diet which is somewhat of a distress to him  Mood remains emotionally labile  Reports he is having difficulty adjusting to his new current ground realities where he is no longer independent      Past Medical & Surgical History   Diabetes  Multivessel CAD  CKD      Past Social History     Reviewed,  has a past history of smoking denies alcohol use    Family History     Reviewed, and family history is not on file.    Medication List     Current Outpatient Medications   Medication Sig Dispense Refill    acetaminophen (TYLENOL) 325 MG tablet Take 650 mg by mouth 3 times daily      aspirin 81 MG EC tablet Take 81 mg by mouth daily      atorvastatin (LIPITOR) 80 MG tablet Take 80 mg by mouth at bedtime       carboxymethylcellulose PF (REFRESH LIQUIGEL) 1 % ophthalmic gel Place 1 drop into both eyes 2 times daily.      clopidogrel (PLAVIX) 75 MG tablet Take 75 mg by mouth daily      escitalopram (LEXAPRO) 20 MG tablet Take 1 tablet (20 mg) by mouth daily      gabapentin (NEURONTIN) 100 MG capsule Take 100 mg by mouth 2 times daily      isosorbide dinitrate (ISORDIL) 5 MG tablet Take 1 tablet (5 mg) by mouth 2 times daily      metFORMIN (GLUCOPHAGE) 500 MG tablet Take 500 mg by mouth daily (with breakfast)      nitroGLYcerin (NITROSTAT) 0.4 MG sublingual tablet Place 0.4 mg under the tongue every 5 minutes as needed for chest pain. For chest pain place 1 tablet under the tongue every 5 minutes for 3 doses. If symptoms persist 5 minutes after 1st dose call 911.      QUEtiapine (SEROQUEL) 25 MG tablet Take 25 mg by mouth daily as needed.      senna-docusate (SENOKOT-S/PERICOLACE) 8.6-50 MG tablet Take 1 tablet by mouth 2 times daily      tiZANidine (ZANAFLEX) 2 MG capsule Take 2 mg by mouth daily       No current facility-administered medications for this visit.      MED REC REQUIRED  Post Medication Reconciliation Status: discharge medications reconciled, continue medications without change       Allergies     Allergies   Allergen Reactions    Latex Hives and Rash    Anesthetics, Lorna Rash    Wool Fiber Unknown       Review of Systems   A comprehensive review of 14 systems was done. Pertinent findings noted here and in history of present illness. All the rest negative.  Constitutional: Negative.  Negative for fever, chills, he has  activity change, appetite change and fatigue.  Dysphagia as improved   HENT: Negative for congestion and facial swelling.    Eyes: Negative for photophobia, redness and visual disturbance.   Respiratory: Negative for cough and chest tightness.    Cardiovascular: Negative for chest pain, palpitations and has leg swelling.   Gastrointestinal: Negative for nausea, diarrhea, constipation, blood  "in stool and abdominal distention.   Genitourinary: Negative.    Musculoskeletal: Reporting multiple falls  Has difficulty walking and using a wheelchair but walking in the parallel bars has an AFO due to foot drop  Skin: Negative.    Neurological: Negative for dizziness, tremors, syncope, weakness, light-headedness and headaches.  Agitated and some confusion noted  Reporting muscle spasms  Left-sided hemiparesis  Hematological: Does not bruise/bleed easily.   Psychiatric/Behavioral: Negative.  Mood is improved per patient  He has had several behavioral issues and gets very agitated and angry.        Physical Exam   BP (!) 162/77   Pulse 64   Temp 97.2  F (36.2  C)   Resp 18   Ht 1.753 m (5' 9\")   Wt 79.8 kg (176 lb)   SpO2 99%   BMI 25.99 kg/m       Constitutional: Oriented to person, place, and time and appears well-developed.   HEENT:  Normocephalic and atraumatic.  Eyes: Conjunctivae and EOM are normal. Pupils are equal, round, and reactive to light. No discharge.  No scleral icterus. Nose normal. Mouth/Throat: Oropharynx is clear and moist. No oropharyngeal exudate.    NECK: Normal range of motion. Neck supple. No JVD present. No tracheal deviation present. No thyromegaly present.   Surgical incision on the left side is healed  CARDIOVASCULAR: Normal rate, regular rhythm and intact distal pulses.  Exam reveals no gallop and no friction rub.  Systolic murmur present.  PULMONARY: Effort normal and breath sounds normal. No respiratory distress.No Wheezing or rales.  ABDOMEN: Soft. Bowel sounds are normal. No distension and no mass.  There is no tenderness. There is no rebound and no guarding. No HSM.  MUSCULOSKELETAL: Normal range of motion. Mild kyphosis, no tenderness.  LYMPH NODES: Has no cervical, supraclavicular, axillary and groin adenopathy.   NEUROLOGICAL: Alert and oriented to person, place, and time. No cranial nerve deficit.  Normal muscle tone. Coordination normal.   Left-sided weakness.  Also " has an  AFO on the left foot  GENITOURINARY: Deferred exam.  SKIN: Skin is warm and dry. No rash noted. No erythema. No pallor.   EXTREMITIES: No cyanosis, no clubbing, has edema. No Deformity.  PSYCHIATRIC: abNormal mood, affect and behavior.  Reports depression and difficulty coping with his current reality      Lab Results     Last Comprehensive Metabolic Panel:  Lab Results   Component Value Date     10/24/2024    POTASSIUM 3.9 10/24/2024    CHLORIDE 111 (H) 10/24/2024    CO2 22 10/24/2024    ANIONGAP 11 10/24/2024     (H) 10/24/2024    BUN 13.0 10/24/2024    CR 1.11 10/24/2024    GFRESTIMATED 69 10/24/2024    MARAL 8.8 10/24/2024           Electronically signed by    Desiree Katz MD

## 2024-12-08 ENCOUNTER — TELEPHONE (OUTPATIENT)
Dept: GERIATRICS | Facility: CLINIC | Age: 74
End: 2024-12-08
Payer: COMMERCIAL

## 2024-12-08 ENCOUNTER — LAB REQUISITION (OUTPATIENT)
Dept: LAB | Facility: CLINIC | Age: 74
End: 2024-12-08
Payer: COMMERCIAL

## 2024-12-08 DIAGNOSIS — R06.02 SHORTNESS OF BREATH: ICD-10-CM

## 2024-12-08 NOTE — PROGRESS NOTES
[facility] GERIATRIC SERVICES TELEPHONE ENCOUNTER    Shaji Markham is a 74 year old  (1950),Nurse called today to report: ongoing SOB complaints with oxygen dropping to mid 70s. oxygen applied to 2-3L via NC with saturations improving to mid 90s. Fever per staff fluctuating from ? Tylenol suppository given per SUZAN.  nausea complaints with emesis x 1 during PM shift. DNR status    ASSESSMENT/PLAN  CXR 2 views  COVID screen  BMP and CBC with diff due 12/9  zofran PRN  albuterol 0.083% PRN  monitor in house due to DNR code status    Electronically signed by:   ALEXANDRA Velez CNP

## 2024-12-09 ENCOUNTER — LAB REQUISITION (OUTPATIENT)
Dept: LAB | Facility: CLINIC | Age: 74
End: 2024-12-09
Payer: COMMERCIAL

## 2024-12-09 DIAGNOSIS — N18.32 CHRONIC KIDNEY DISEASE, STAGE 3B (H): ICD-10-CM

## 2024-12-09 LAB
ANION GAP SERPL CALCULATED.3IONS-SCNC: 8 MMOL/L (ref 7–15)
BASOPHILS # BLD AUTO: 0 10E3/UL (ref 0–0.2)
BASOPHILS NFR BLD AUTO: 0 %
BUN SERPL-MCNC: 47.6 MG/DL (ref 8–23)
CALCIUM SERPL-MCNC: 8.8 MG/DL (ref 8.8–10.4)
CHLORIDE SERPL-SCNC: 102 MMOL/L (ref 98–107)
CREAT SERPL-MCNC: 1.74 MG/DL (ref 0.67–1.17)
EGFRCR SERPLBLD CKD-EPI 2021: 40 ML/MIN/1.73M2
EOSINOPHIL # BLD AUTO: 0.2 10E3/UL (ref 0–0.7)
EOSINOPHIL NFR BLD AUTO: 2 %
ERYTHROCYTE [DISTWIDTH] IN BLOOD BY AUTOMATED COUNT: 13.8 % (ref 10–15)
GLUCOSE SERPL-MCNC: 97 MG/DL (ref 70–99)
HCO3 SERPL-SCNC: 28 MMOL/L (ref 22–29)
HCT VFR BLD AUTO: 32.2 % (ref 40–53)
HGB BLD-MCNC: 10 G/DL (ref 13.3–17.7)
IMM GRANULOCYTES # BLD: 0.1 10E3/UL
IMM GRANULOCYTES NFR BLD: 0 %
LYMPHOCYTES # BLD AUTO: 1.7 10E3/UL (ref 0.8–5.3)
LYMPHOCYTES NFR BLD AUTO: 14 %
MCH RBC QN AUTO: 31.8 PG (ref 26.5–33)
MCHC RBC AUTO-ENTMCNC: 31.1 G/DL (ref 31.5–36.5)
MCV RBC AUTO: 103 FL (ref 78–100)
MONOCYTES # BLD AUTO: 1 10E3/UL (ref 0–1.3)
MONOCYTES NFR BLD AUTO: 8 %
NEUTROPHILS # BLD AUTO: 9.5 10E3/UL (ref 1.6–8.3)
NEUTROPHILS NFR BLD AUTO: 76 %
NRBC # BLD AUTO: 0 10E3/UL
NRBC BLD AUTO-RTO: 0 /100
PLATELET # BLD AUTO: 227 10E3/UL (ref 150–450)
POTASSIUM SERPL-SCNC: 4.7 MMOL/L (ref 3.4–5.3)
RBC # BLD AUTO: 3.14 10E6/UL (ref 4.4–5.9)
SODIUM SERPL-SCNC: 138 MMOL/L (ref 135–145)
WBC # BLD AUTO: 12.5 10E3/UL (ref 4–11)

## 2024-12-09 PROCEDURE — 80048 BASIC METABOLIC PNL TOTAL CA: CPT | Mod: ORL | Performed by: NURSE PRACTITIONER

## 2024-12-09 PROCEDURE — 85025 COMPLETE CBC W/AUTO DIFF WBC: CPT | Mod: ORL | Performed by: NURSE PRACTITIONER

## 2024-12-09 PROCEDURE — 36415 COLL VENOUS BLD VENIPUNCTURE: CPT | Mod: ORL | Performed by: NURSE PRACTITIONER

## 2024-12-09 PROCEDURE — P9604 ONE-WAY ALLOW PRORATED TRIP: HCPCS | Mod: ORL | Performed by: NURSE PRACTITIONER

## 2024-12-10 LAB
ANION GAP SERPL CALCULATED.3IONS-SCNC: 10 MMOL/L (ref 7–15)
BUN SERPL-MCNC: 47.6 MG/DL (ref 8–23)
CALCIUM SERPL-MCNC: 8.9 MG/DL (ref 8.8–10.4)
CHLORIDE SERPL-SCNC: 102 MMOL/L (ref 98–107)
CREAT SERPL-MCNC: 1.67 MG/DL (ref 0.67–1.17)
EGFRCR SERPLBLD CKD-EPI 2021: 42 ML/MIN/1.73M2
ERYTHROCYTE [DISTWIDTH] IN BLOOD BY AUTOMATED COUNT: 13.3 % (ref 10–15)
GLUCOSE SERPL-MCNC: 99 MG/DL (ref 70–99)
HCO3 SERPL-SCNC: 27 MMOL/L (ref 22–29)
HCT VFR BLD AUTO: 35.5 % (ref 40–53)
HGB BLD-MCNC: 11.1 G/DL (ref 13.3–17.7)
MCH RBC QN AUTO: 31.6 PG (ref 26.5–33)
MCHC RBC AUTO-ENTMCNC: 31.3 G/DL (ref 31.5–36.5)
MCV RBC AUTO: 101 FL (ref 78–100)
PLATELET # BLD AUTO: 249 10E3/UL (ref 150–450)
POTASSIUM SERPL-SCNC: 5 MMOL/L (ref 3.4–5.3)
RBC # BLD AUTO: 3.51 10E6/UL (ref 4.4–5.9)
SODIUM SERPL-SCNC: 139 MMOL/L (ref 135–145)
WBC # BLD AUTO: 9.1 10E3/UL (ref 4–11)

## 2024-12-10 PROCEDURE — 80048 BASIC METABOLIC PNL TOTAL CA: CPT | Mod: ORL | Performed by: NURSE PRACTITIONER

## 2024-12-10 PROCEDURE — 85027 COMPLETE CBC AUTOMATED: CPT | Mod: ORL | Performed by: NURSE PRACTITIONER

## 2024-12-10 PROCEDURE — 36415 COLL VENOUS BLD VENIPUNCTURE: CPT | Mod: ORL | Performed by: NURSE PRACTITIONER

## 2024-12-10 PROCEDURE — P9604 ONE-WAY ALLOW PRORATED TRIP: HCPCS | Mod: ORL | Performed by: NURSE PRACTITIONER

## 2025-01-15 ENCOUNTER — NURSING HOME VISIT (OUTPATIENT)
Dept: GERIATRICS | Facility: CLINIC | Age: 75
End: 2025-01-15
Payer: COMMERCIAL

## 2025-01-15 ENCOUNTER — LAB REQUISITION (OUTPATIENT)
Dept: LAB | Facility: CLINIC | Age: 75
End: 2025-01-15
Payer: COMMERCIAL

## 2025-01-15 VITALS
TEMPERATURE: 97.4 F | HEIGHT: 69 IN | RESPIRATION RATE: 20 BRPM | BODY MASS INDEX: 22.96 KG/M2 | SYSTOLIC BLOOD PRESSURE: 144 MMHG | DIASTOLIC BLOOD PRESSURE: 86 MMHG | HEART RATE: 75 BPM | OXYGEN SATURATION: 93 % | WEIGHT: 155 LBS

## 2025-01-15 DIAGNOSIS — R25.2 SPASTICITY AS LATE EFFECT OF CEREBROVASCULAR ACCIDENT (CVA): ICD-10-CM

## 2025-01-15 DIAGNOSIS — R13.10 DYSPHAGIA, UNSPECIFIED TYPE: ICD-10-CM

## 2025-01-15 DIAGNOSIS — I65.29 STENOSIS OF CAROTID ARTERY, UNSPECIFIED LATERALITY: Primary | ICD-10-CM

## 2025-01-15 DIAGNOSIS — E78.2 MIXED DYSLIPIDEMIA: ICD-10-CM

## 2025-01-15 DIAGNOSIS — R41.89 OTHER SYMPTOMS AND SIGNS INVOLVING COGNITIVE FUNCTIONS AND AWARENESS: ICD-10-CM

## 2025-01-15 DIAGNOSIS — F34.1 PERSISTENT DEPRESSIVE DISORDER WITH ANXIOUS DISTRESS, CURRENTLY SEVERE: ICD-10-CM

## 2025-01-15 DIAGNOSIS — I69.398 SPASTICITY AS LATE EFFECT OF CEREBROVASCULAR ACCIDENT (CVA): ICD-10-CM

## 2025-01-15 DIAGNOSIS — Z98.890 STATUS POST CAROTID ENDARTERECTOMY: ICD-10-CM

## 2025-01-15 DIAGNOSIS — I25.118 CORONARY ARTERY DISEASE OF NATIVE ARTERY OF NATIVE HEART WITH STABLE ANGINA PECTORIS: ICD-10-CM

## 2025-01-15 DIAGNOSIS — N18.32 STAGE 3B CHRONIC KIDNEY DISEASE (H): ICD-10-CM

## 2025-01-15 DIAGNOSIS — E11.00 TYPE 2 DIABETES MELLITUS WITH HYPEROSMOLARITY WITHOUT COMA, UNSPECIFIED WHETHER LONG TERM INSULIN USE (H): ICD-10-CM

## 2025-01-15 DIAGNOSIS — Z86.73 HISTORY OF CVA (CEREBROVASCULAR ACCIDENT): ICD-10-CM

## 2025-01-15 NOTE — PROGRESS NOTES
[facility] GERIATRIC SERVICES    Code Status:  DNR   Visit Type:   Chief Complaint   Patient presents with    longterm Regulatory     Facility:  [facility] () [[id number]]         HPI: Shaji Markham is a 74 year old male who I am seeing today for regulatory visit on LTC. Past medical history of that includes CAD with recurrent CVA, dysphagia, depression with anxiety, DM2 and CKD stage 3b.  Pt with > 70% stenosis of the right internal carotid artery and > 70% stenosis in the left internal carotid artery. Bilateral external carotid stenosis. Pt with left sided neglect. Hx of CVA with left sided weakness.  Patient underwent left transcarotid artery revascularization for left carotid artery stenosis on 10/17/24 by Dr. Ayon.  Postoperative patient developed some aphasia due to encephalopathy after anesthesia.  CTA was negative for acute stroke and showed a patent stent with expected compression in the midportion of the stent from heavily calcified plaque. Chronic left hemiplegia due to hx of CVA. Post op anemia with relatively small retroperitoneal bleed observed on CTA.  Hemoglobin has stabilized.  Patient has baseline dysphagia prior to surgery and has worked with SLP in the past. Pt failed initial swallow study along with repeat during hospitalization. SLP evaluation recommendations include dysphagia diet (clear liquid diet and/or small puree portions up to 4 oz based on pt/family/MD decision), along with repeat VFSS in 1-2 weeks.      Today patient sitting up in wheelchair. Hx of CVA with left sided neglect. He is moving is left side somewhat. He is unable to overcome gravity in LUE. Some underlying cognitive impairment due to CVA. Nursing staff report increased restlessness, agitation and impulsiveness. He has hx of depression with anxiety and continues on lexapro. He was on Seroquel in the past but has been weaned off this. He denies any SOB or CP. He is incontinent of bowel and bladder. He  denies any dizziness or headache. Blood pressure satisfactory controlled. He is nonambulatory.     Assessment/Plan:     Coronary artery disease of native artery of native heart with stable angina pectoris (H24)  Acute ischemic right posterior cerebral artery (PCA) stroke (H)  Spasticity  -Continue ASA, clopidogrel and statin.   -left side hemiparesis.   -wheelchair bound.   -Continue AFO splinting to left lower extremity.  -continue isosorbide 5 mg BID. (Hold for SBP < 95.)   -continues tizanidine 2 mg every day for spasticity.  -Continue gabapentin BID for neuropathic pain.    Carotid stenosis  -S/p left transcarotid artery revascularization for left carotid artery stenosis on 10/17/24.   -CTA post operatively was negative for acute stroke and showed a patent stent with expected compression in the midportion of the stent from heavily calcified plaque. There is a focal intimal flap related to access from the TCAR sheath. This is not hemodynamically significant. No further intervention recommended.   -Continue statin.   -ASA 81 mg daily.   -continue plavix 75 mg every day for one year  -Follow up for repeat surveillance ultrasound in 1 year.     Post op anemia  -CTA showed relatively small retroperitoneal bleed.  -Follow up CBC in am.      Anxiety with depression  -increased restless and agitation.    -no longer on seroquel.   -Continue Lexapro 20 mg every day. Monitor symptoms.   -Follow up with ACP.  -No suicidal ideations today.     Dysphagia   -Pt failed initial swallow study along with repeat during hospitalization.  -dysphagia diet (clear liquid diet and/or small puree portions up to 4 oz     CKD  -Creatine was up to 1.67.   -Follow up BMP.     Active Ambulatory Problems     Diagnosis Date Noted    Abnormal stress test 01/04/2024    Acute ischemic right posterior cerebral artery (PCA) stroke (H) 01/04/2024    Cerebrovascular accident (CVA) due to occlusion of left posterior cerebral artery (H) 07/04/2022     Type 2 diabetes mellitus (H) 02/16/2017    Tinnitus, bilateral 04/04/2022    Stage 3b chronic kidney disease (H) 03/13/2021    Onychomycosis of right great toe 04/05/2023    NAFLD (nonalcoholic fatty liver disease) 10/09/2019    Mixed dyslipidemia 05/05/2011    Elevated lipoprotein(a) 04/06/2022    Coronary artery disease of native artery of native heart with stable angina pectoris 01/04/2024    Visceral obesity 11/13/2020    Periodontitis 12/06/2023    History of CVA (cerebrovascular accident) 03/10/2024    Hypotension 03/10/2024    Carotid stenosis 10/17/2024    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    CKD (chronic kidney disease) 10/17/2024    HTN (hypertension) 10/17/2024     Resolved Ambulatory Problems     Diagnosis Date Noted    No Resolved Ambulatory Problems     No Additional Past Medical History     Allergies   Allergen Reactions    Latex Hives and Rash    Anesthetics, Lorna Rash    Wool Fiber Unknown       All Meds and Allergies reviewed in the record at the facility and is the most up-to-date.    Current Outpatient Medications   Medication Sig Dispense Refill    acetaminophen (TYLENOL) 325 MG tablet Take 650 mg by mouth 3 times daily      aspirin 81 MG EC tablet Take 81 mg by mouth daily      atorvastatin (LIPITOR) 80 MG tablet Take 80 mg by mouth at bedtime      carboxymethylcellulose PF (REFRESH LIQUIGEL) 1 % ophthalmic gel Place 1 drop into both eyes 2 times daily.      clopidogrel (PLAVIX) 75 MG tablet Take 75 mg by mouth daily      escitalopram (LEXAPRO) 20 MG tablet Take 1 tablet (20 mg) by mouth daily      gabapentin (NEURONTIN) 100 MG capsule Take 100 mg by mouth 2 times daily      isosorbide dinitrate (ISORDIL) 5 MG tablet Take 1 tablet (5 mg) by mouth 2 times daily      metFORMIN (GLUCOPHAGE) 500 MG tablet Take 500 mg by mouth daily (with breakfast)      nitroGLYcerin (NITROSTAT) 0.4 MG sublingual tablet Place 0.4 mg under the tongue every 5 minutes as needed for chest pain. For chest  "pain place 1 tablet under the tongue every 5 minutes for 3 doses. If symptoms persist 5 minutes after 1st dose call 911.      QUEtiapine (SEROQUEL) 25 MG tablet Take 25 mg by mouth daily as needed.      senna-docusate (SENOKOT-S/PERICOLACE) 8.6-50 MG tablet Take 1 tablet by mouth 2 times daily      tiZANidine (ZANAFLEX) 2 MG capsule Take 2 mg by mouth daily       No current facility-administered medications for this visit.       REVIEW OF SYSTEMS:   10 point review of systems reviewed and pertinent positives in the HPI.     PHYSICAL EXAMINATION:  Physical Exam     Vital signs: BP (!) 144/86   Pulse 75   Temp 97.4  F (36.3  C)   Resp 20   Ht 1.753 m (5' 9\")   Wt 70.3 kg (155 lb)   SpO2 93%   BMI 22.89 kg/m    General: Awake, Alert, oriented x3, lying in bed, follows simple commands, conversant  HEENT:PERRLA, Pink conjunctiva, moist oral mucosa, tongue is midline, facial symmetry.   NECK: Supple. Incision to left neck ealed.   CVS:  S1  S2, without murmur or gallop.   LUNG: Clear to auscultation, No wheezes, rales or rhonci.  BACK: No kyphosis of the thoracic spine  ABDOMEN: Soft, nontender to palpation, with positive bowel sounds  EXTREMITIES: Left sided hemiparesis. Pt is moving left arm somewhat.  no pedal edema, no calf tenderness  SKIN: Warm and dry  NEUROLOGIC: see above.   PSYCHIATRIC:Cognitive impairment noted. Flat affect.       Labs:  All labs reviewed in the nursing home record and Com[facility]   @  Lab Results   Component Value Date    WBC 7.1 10/11/2024     Lab Results   Component Value Date    RBC 3.66 10/11/2024     Lab Results   Component Value Date    HGB 11.6 10/11/2024     Lab Results   Component Value Date    HCT 36.6 10/11/2024     Lab Results   Component Value Date     10/11/2024     Lab Results   Component Value Date    MCH 31.7 10/11/2024     Lab Results   Component Value Date    MCHC 31.7 10/11/2024     Lab Results   Component Value Date    RDW 13.5 10/11/2024     Lab Results   Component " Value Date     10/11/2024        @Last Comprehensive Metabolic Panel:  Sodium   Date Value Ref Range Status   12/10/2024 139 135 - 145 mmol/L Final     Potassium   Date Value Ref Range Status   12/10/2024 5.0 3.4 - 5.3 mmol/L Final     Chloride   Date Value Ref Range Status   12/10/2024 102 98 - 107 mmol/L Final     Carbon Dioxide (CO2)   Date Value Ref Range Status   12/10/2024 27 22 - 29 mmol/L Final     Anion Gap   Date Value Ref Range Status   12/10/2024 10 7 - 15 mmol/L Final     Glucose   Date Value Ref Range Status   12/10/2024 99 70 - 99 mg/dL Final     Urea Nitrogen   Date Value Ref Range Status   12/10/2024 47.6 (H) 8.0 - 23.0 mg/dL Final     Creatinine   Date Value Ref Range Status   12/10/2024 1.67 (H) 0.67 - 1.17 mg/dL Final     GFR Estimate   Date Value Ref Range Status   12/10/2024 42 (L) >60 mL/min/1.73m2 Final     Calcium   Date Value Ref Range Status   12/10/2024 8.9 8.8 - 10.4 mg/dL Final     Comment:     Reference intervals for this test were updated on 7/16/2024 to reflect our healthy population more accurately. There may be differences in the flagging of prior results with similar values performed with this method. Those prior results can be interpreted in the context of the updated reference intervals.     This note has been dictated using voice recognition software. Any grammatical or context distortions are unintentional and inherent to the software    Electronically signed by: Essence Conteh CNP

## 2025-01-15 NOTE — LETTER
1/15/2025      Shaji Markham  [address] [place] [address]        [facility]    Code Status:  DNR   Visit Type:   Chief Complaint   Patient presents with     senior living Regulatory     Facility:  [facility] [facility] () [[id number]]         HPI: Shaji Markham is a 74 year old male who I am seeing today for regulatory visit on LTC. Past medical history of that includes CAD with recurrent CVA, dysphagia, depression with anxiety, DM2 and CKD stage 3b.  Pt with > 70% stenosis of the right internal carotid artery and > 70% stenosis in the left internal carotid artery. Bilateral external carotid stenosis. Pt with left sided neglect. Hx of CVA with left sided weakness.  Patient underwent left transcarotid artery revascularization for left carotid artery stenosis on 10/17/24 by Dr. Ayon.  Postoperative patient developed some aphasia due to encephalopathy after anesthesia.  CTA was negative for acute stroke and showed a patent stent with expected compression in the midportion of the stent from heavily calcified plaque. Chronic left hemiplegia due to hx of CVA. Post op anemia with relatively small retroperitoneal bleed observed on CTA.  Hemoglobin has stabilized.  Patient has baseline dysphagia prior to surgery and has worked with SLP in the past. Pt failed initial swallow study along with repeat during hospitalization. SLP evaluation recommendations include dysphagia diet (clear liquid diet and/or small puree portions up to 4 oz based on pt/family/MD decision), along with repeat VFSS in 1-2 weeks.      Today patient sitting up in wheelchair. Hx of CVA with left sided neglect. He is moving is left side somewhat. He is unable to overcome gravity in LUE. Some underlying cognitive impairment due to CVA. Nursing staff report increased restlessness, agitation and impulsiveness. He has hx of depression with anxiety and continues on lexapro. He was on Seroquel in the past but has been weaned off  this. He denies any SOB or CP. He is incontinent of bowel and bladder. He denies any dizziness or headache. Blood pressure satisfactory controlled. He is nonambulatory.     Assessment/Plan:     Coronary artery disease of native artery of native heart with stable angina pectoris (H24)  Acute ischemic right posterior cerebral artery (PCA) stroke (H)  Spasticity  -Continue ASA, clopidogrel and statin.   -left side hemiparesis.   -wheelchair bound.   -Continue AFO splinting to left lower extremity.  -continue isosorbide 5 mg BID. (Hold for SBP < 95.)   -continues tizanidine 2 mg every day for spasticity.  -Continue gabapentin BID for neuropathic pain.    Carotid stenosis  -S/p left transcarotid artery revascularization for left carotid artery stenosis on 10/17/24.   -CTA post operatively was negative for acute stroke and showed a patent stent with expected compression in the midportion of the stent from heavily calcified plaque. There is a focal intimal flap related to access from the TCAR sheath. This is not hemodynamically significant. No further intervention recommended.   -Continue statin.   -ASA 81 mg daily.   -continue plavix 75 mg every day for one year  -Follow up for repeat surveillance ultrasound in 1 year.     Post op anemia  -CTA showed relatively small retroperitoneal bleed.  -Follow up CBC in am.      Anxiety with depression  -increased restless and agitation.    -no longer on seroquel.   -Continue Lexapro 20 mg every day. Monitor symptoms.   -Follow up with ACP.  -No suicidal ideations today.     Dysphagia   -Pt failed initial swallow study along with repeat during hospitalization.  -dysphagia diet (clear liquid diet and/or small puree portions up to 4 oz     CKD  -Creatine was up to 1.67.   -Follow up BMP.     Active Ambulatory Problems     Diagnosis Date Noted     Abnormal stress test 01/04/2024     Acute ischemic right posterior cerebral artery (PCA) stroke (H) 01/04/2024     Cerebrovascular accident  (CVA) due to occlusion of left posterior cerebral artery (H) 07/04/2022     Type 2 diabetes mellitus (H) 02/16/2017     Tinnitus, bilateral 04/04/2022     Stage 3b chronic kidney disease (H) 03/13/2021     Onychomycosis of right great toe 04/05/2023     NAFLD (nonalcoholic fatty liver disease) 10/09/2019     Mixed dyslipidemia 05/05/2011     Elevated lipoprotein(a) 04/06/2022     Coronary artery disease of native artery of native heart with stable angina pectoris 01/04/2024     Visceral obesity 11/13/2020     Periodontitis 12/06/2023     History of CVA (cerebrovascular accident) 03/10/2024     Hypotension 03/10/2024     Carotid stenosis 10/17/2024     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     CKD (chronic kidney disease) 10/17/2024     HTN (hypertension) 10/17/2024     Resolved Ambulatory Problems     Diagnosis Date Noted     No Resolved Ambulatory Problems     No Additional Past Medical History     Allergies   Allergen Reactions     Latex Hives and Rash     Anesthetics, Lorna Rash     Wool Fiber Unknown       All Meds and Allergies reviewed in the record at the facility and is the most up-to-date.    Current Outpatient Medications   Medication Sig Dispense Refill     acetaminophen (TYLENOL) 325 MG tablet Take 650 mg by mouth 3 times daily       aspirin 81 MG EC tablet Take 81 mg by mouth daily       atorvastatin (LIPITOR) 80 MG tablet Take 80 mg by mouth at bedtime       carboxymethylcellulose PF (REFRESH LIQUIGEL) 1 % ophthalmic gel Place 1 drop into both eyes 2 times daily.       clopidogrel (PLAVIX) 75 MG tablet Take 75 mg by mouth daily       escitalopram (LEXAPRO) 20 MG tablet Take 1 tablet (20 mg) by mouth daily       gabapentin (NEURONTIN) 100 MG capsule Take 100 mg by mouth 2 times daily       isosorbide dinitrate (ISORDIL) 5 MG tablet Take 1 tablet (5 mg) by mouth 2 times daily       metFORMIN (GLUCOPHAGE) 500 MG tablet Take 500 mg by mouth daily (with breakfast)       nitroGLYcerin (NITROSTAT) 0.4  "MG sublingual tablet Place 0.4 mg under the tongue every 5 minutes as needed for chest pain. For chest pain place 1 tablet under the tongue every 5 minutes for 3 doses. If symptoms persist 5 minutes after 1st dose call 911.       QUEtiapine (SEROQUEL) 25 MG tablet Take 25 mg by mouth daily as needed.       senna-docusate (SENOKOT-S/PERICOLACE) 8.6-50 MG tablet Take 1 tablet by mouth 2 times daily       tiZANidine (ZANAFLEX) 2 MG capsule Take 2 mg by mouth daily       No current facility-administered medications for this visit.       REVIEW OF SYSTEMS:   10 point review of systems reviewed and pertinent positives in the HPI.     PHYSICAL EXAMINATION:  Physical Exam     Vital signs: BP (!) 144/86   Pulse 75   Temp 97.4  F (36.3  C)   Resp 20   Ht 1.753 m (5' 9\")   Wt 70.3 kg (155 lb)   SpO2 93%   BMI 22.89 kg/m    General: Awake, Alert, oriented x3, lying in bed, follows simple commands, conversant  HEENT:PERRLA, Pink conjunctiva, moist oral mucosa, tongue is midline, facial symmetry.   NECK: Supple. Incision to left neck ealed.   CVS:  S1  S2, without murmur or gallop.   LUNG: Clear to auscultation, No wheezes, rales or rhonci.  BACK: No kyphosis of the thoracic spine  ABDOMEN: Soft, nontender to palpation, with positive bowel sounds  EXTREMITIES: Left sided hemiparesis. Pt is moving left arm somewhat.  no pedal edema, no calf tenderness  SKIN: Warm and dry  NEUROLOGIC: see above.   PSYCHIATRIC:Cognitive impairment noted. Flat affect.       Labs:  All labs reviewed in the nursing home record and Epic   @  Lab Results   Component Value Date    WBC 7.1 10/11/2024     Lab Results   Component Value Date    RBC 3.66 10/11/2024     Lab Results   Component Value Date    HGB 11.6 10/11/2024     Lab Results   Component Value Date    HCT 36.6 10/11/2024     Lab Results   Component Value Date     10/11/2024     Lab Results   Component Value Date    MCH 31.7 10/11/2024     Lab Results   Component Value Date    MCHC " 31.7 10/11/2024     Lab Results   Component Value Date    RDW 13.5 10/11/2024     Lab Results   Component Value Date     10/11/2024        @Last Comprehensive Metabolic Panel:  Sodium   Date Value Ref Range Status   12/10/2024 139 135 - 145 mmol/L Final     Potassium   Date Value Ref Range Status   12/10/2024 5.0 3.4 - 5.3 mmol/L Final     Chloride   Date Value Ref Range Status   12/10/2024 102 98 - 107 mmol/L Final     Carbon Dioxide (CO2)   Date Value Ref Range Status   12/10/2024 27 22 - 29 mmol/L Final     Anion Gap   Date Value Ref Range Status   12/10/2024 10 7 - 15 mmol/L Final     Glucose   Date Value Ref Range Status   12/10/2024 99 70 - 99 mg/dL Final     Urea Nitrogen   Date Value Ref Range Status   12/10/2024 47.6 (H) 8.0 - 23.0 mg/dL Final     Creatinine   Date Value Ref Range Status   12/10/2024 1.67 (H) 0.67 - 1.17 mg/dL Final     GFR Estimate   Date Value Ref Range Status   12/10/2024 42 (L) >60 mL/min/1.73m2 Final     Calcium   Date Value Ref Range Status   12/10/2024 8.9 8.8 - 10.4 mg/dL Final     Comment:     Reference intervals for this test were updated on 7/16/2024 to reflect our healthy population more accurately. There may be differences in the flagging of prior results with similar values performed with this method. Those prior results can be interpreted in the context of the updated reference intervals.     This note has been dictated using voice recognition software. Any grammatical or context distortions are unintentional and inherent to the software    Electronically signed by: Essence Conteh CNP       Sincerely,        Essence Conteh NP    Electronically signed

## 2025-01-16 LAB
ANION GAP SERPL CALCULATED.3IONS-SCNC: 9 MMOL/L (ref 7–15)
BUN SERPL-MCNC: 20.3 MG/DL (ref 8–23)
CALCIUM SERPL-MCNC: 8.8 MG/DL (ref 8.8–10.4)
CHLORIDE SERPL-SCNC: 106 MMOL/L (ref 98–107)
CREAT SERPL-MCNC: 1.24 MG/DL (ref 0.67–1.17)
EGFRCR SERPLBLD CKD-EPI 2021: 61 ML/MIN/1.73M2
ERYTHROCYTE [DISTWIDTH] IN BLOOD BY AUTOMATED COUNT: 13.2 % (ref 10–15)
GLUCOSE SERPL-MCNC: 110 MG/DL (ref 70–99)
HCO3 SERPL-SCNC: 24 MMOL/L (ref 22–29)
HCT VFR BLD AUTO: 35.5 % (ref 40–53)
HGB BLD-MCNC: 11.2 G/DL (ref 13.3–17.7)
MCH RBC QN AUTO: 31.4 PG (ref 26.5–33)
MCHC RBC AUTO-ENTMCNC: 31.5 G/DL (ref 31.5–36.5)
MCV RBC AUTO: 99 FL (ref 78–100)
PLATELET # BLD AUTO: 271 10E3/UL (ref 150–450)
POTASSIUM SERPL-SCNC: 4.9 MMOL/L (ref 3.4–5.3)
RBC # BLD AUTO: 3.57 10E6/UL (ref 4.4–5.9)
SODIUM SERPL-SCNC: 139 MMOL/L (ref 135–145)
WBC # BLD AUTO: 7.2 10E3/UL (ref 4–11)

## 2025-01-16 PROCEDURE — 80048 BASIC METABOLIC PNL TOTAL CA: CPT | Mod: ORL | Performed by: NURSE PRACTITIONER

## 2025-01-16 PROCEDURE — 36415 COLL VENOUS BLD VENIPUNCTURE: CPT | Mod: ORL | Performed by: NURSE PRACTITIONER

## 2025-01-16 PROCEDURE — 85027 COMPLETE CBC AUTOMATED: CPT | Mod: ORL | Performed by: NURSE PRACTITIONER

## 2025-01-16 PROCEDURE — P9604 ONE-WAY ALLOW PRORATED TRIP: HCPCS | Mod: ORL | Performed by: NURSE PRACTITIONER

## 2025-03-24 PROBLEM — I69.354 HEMIPARESIS AFFECTING LEFT SIDE AS LATE EFFECT OF CEREBROVASCULAR ACCIDENT (CVA) (H): Status: ACTIVE | Noted: 2025-03-24

## 2025-03-24 PROBLEM — F33.1 MODERATE EPISODE OF RECURRENT MAJOR DEPRESSIVE DISORDER (H): Status: ACTIVE | Noted: 2025-03-24

## 2025-03-25 VITALS
RESPIRATION RATE: 20 BRPM | HEART RATE: 68 BPM | BODY MASS INDEX: 23.99 KG/M2 | SYSTOLIC BLOOD PRESSURE: 130 MMHG | HEIGHT: 69 IN | TEMPERATURE: 98.1 F | WEIGHT: 162 LBS | OXYGEN SATURATION: 96 % | DIASTOLIC BLOOD PRESSURE: 78 MMHG

## 2025-03-25 NOTE — PROGRESS NOTES
[facility]       Patient Shaji Markham  MRN: [id number]        Reason for Visit     Chief Complaint   Patient presents with    long term Regulatory       Code Status     DNR only    Assessment     L carotid A stenosis s/p left carotid artery revascularization on 10/17/2024 [TCAR    Multivessel CAD-patient is felt to be a poor surgical candidate    Prior history of CVA post angiogram-right posterior cerebral artery  Etiology felt to be periprocedural  Has left sided hemiparesis.  Hyperlipidemia continue with his statins    Hypertension complicated by low blood pressures    Diabetes type 2   currently on oral agents.  CKD 3b-  Prior history of tobacco use currently abstaining  Mood disorder/adjustment reaction  Underlying history of vascular dementia with ongoing agitation  Chronic dysphagia  Was evaluated by SLP and recommendation is to continue with his modified diet on clear liquid with puréed portions-   diabetic neuropathy   Anemia-last Hg stable at 8.2    Generalized weakness    Patient is a resident of long-term care seen today for follow-up visit  At baseline has left-sided weakness and hemiparesis   Has not been able to progress to independence in spite of aggressive therapy  At baseline due to decline he has become wheelchair-bound he has a history of recurrent falls  Unfortunately remains bedbound and wheelchair-bound at baseline.  Mood has been stable and he has been taken off his quetiapine.  However intermittent agitation has been reported.  New behavior of requesting food from other people as well as putting inanimate objects in his mouth has been reported by staff.  They have doubled his food portions  Blood pressures are stable without medication  Monitor weight.  Recheck his routine labs including an A1c      History     Patient is a very pleasant 74 year old male who is a resident of long-term care  Patient was initially admitted to the hospital with multivessel CAD  He has a  history of CVA post angiogram with resultant left-sided weakness  [place] to be a poor surgical candidate and discharged on medical management  Course complicated by hypotension  Was in the TCU and now moved to long-term care  He was readmitted to the hospital with history of bilateral carotid artery stenosis.  He underwent TCAR on 10/17/2024  Postoperatively had some encephalopathy concerns which have resolved.  At baseline remains a poor historian  Also has s vascular dementia with some behavioral concerns he was recently taken of quetiapine  Staff reports agitation with him requesting more food he has been put on double portions due to that  Mood remains emotionally labile    Past Medical & Surgical History   Diabetes  Multivessel CAD  CKD      Past Social History     Reviewed,  has a past history of smoking denies alcohol use    Family History     Reviewed, and family history is not on file.    Medication List     Current Outpatient Medications   Medication Sig Dispense Refill    acetaminophen (TYLENOL) 325 MG tablet Take 650 mg by mouth 3 times daily      aspirin 81 MG EC tablet Take 81 mg by mouth daily      atorvastatin (LIPITOR) 80 MG tablet Take 80 mg by mouth at bedtime      carboxymethylcellulose PF (REFRESH LIQUIGEL) 1 % ophthalmic gel Place 1 drop into both eyes 2 times daily.      clopidogrel (PLAVIX) 75 MG tablet Take 75 mg by mouth daily      escitalopram (LEXAPRO) 20 MG tablet Take 1 tablet (20 mg) by mouth daily      gabapentin (NEURONTIN) 100 MG capsule Take 100 mg by mouth 2 times daily      isosorbide dinitrate (ISORDIL) 5 MG tablet Take 1 tablet (5 mg) by mouth 2 times daily      metFORMIN (GLUCOPHAGE) 500 MG tablet Take 500 mg by mouth daily (with breakfast)      nitroGLYcerin (NITROSTAT) 0.4 MG sublingual tablet Place 0.4 mg under the tongue every 5 minutes as needed for chest pain. For chest pain place 1 tablet under the tongue every 5 minutes for 3 doses. If symptoms persist 5 minutes after  "1st dose call 911.      senna-docusate (SENOKOT-S/PERICOLACE) 8.6-50 MG tablet Take 1 tablet by mouth 2 times daily      tiZANidine (ZANAFLEX) 2 MG capsule Take 2 mg by mouth daily       No current facility-administered medications for this visit.      MED REC REQUIRED  Post Medication Reconciliation Status: discharge medications reconciled, continue medications without change       Allergies     Allergies   Allergen Reactions    Latex Hives and Rash    Anesthetics, Lorna Rash    Wool Fiber Unknown       Review of Systems   A comprehensive review of 14 systems was done. Pertinent findings noted here and in history of present illness. All the rest negative.  Constitutional: Negative.  Negative for fever, chills, he has  activity change, appetite change and fatigue.  Dysphagia as improved   HENT: Negative for congestion and facial swelling.    Eyes: Negative for photophobia, redness and visual disturbance.   Respiratory: Negative for cough and chest tightness.    Cardiovascular: Negative for chest pain, palpitations and has leg swelling.   Gastrointestinal: Negative for nausea, diarrhea, constipation, blood in stool and abdominal distention.   Genitourinary: Negative.    Musculoskeletal: Reporting multiple falls  Has difficulty walking and using a wheelchair but walking in the parallel bars has an AFO due to foot drop  Skin: Negative.    Neurological: Negative for dizziness, tremors, syncope, weakness, light-headedness and headaches.  Agitated and some confusion noted  Reporting muscle spasms  Left-sided hemiparesis  Hematological: Does not bruise/bleed easily.   Psychiatric/Behavioral: Negative.  Mood is improved per patient  He has had several behavioral issues and gets very agitated and angry.        Physical Exam   /78   Pulse 68   Temp 98.1  F (36.7  C)   Resp 20   Ht 1.753 m (5' 9\")   Wt 73.5 kg (162 lb)   SpO2 96%   BMI 23.92 kg/m       Constitutional: Oriented to person, place, and time and " appears well-developed.   HEENT:  Normocephalic and atraumatic.  Eyes: Conjunctivae and EOM are normal. Pupils are equal, round, and reactive to light. No discharge.  No scleral icterus. Nose normal. Mouth/Throat: Oropharynx is clear and moist. No oropharyngeal exudate.    NECK: Normal range of motion. Neck supple. No JVD present. No tracheal deviation present. No thyromegaly present.   Surgical incision on the left side is healed  CARDIOVASCULAR: Normal rate, regular rhythm and intact distal pulses.  Exam reveals no gallop and no friction rub.  Systolic murmur present.  PULMONARY: Effort normal and breath sounds normal. No respiratory distress.No Wheezing or rales.  ABDOMEN: Soft. Bowel sounds are normal. No distension and no mass.  There is no tenderness. There is no rebound and no guarding. No HSM.  MUSCULOSKELETAL: Normal range of motion. Mild kyphosis, no tenderness.  LYMPH NODES: Has no cervical, supraclavicular, axillary and groin adenopathy.   NEUROLOGICAL: Alert and oriented to person, place, and time. No cranial nerve deficit.  Normal muscle tone. Coordination normal.   Left-sided weakness.  Also has an  AFO on the left foot  GENITOURINARY: Deferred exam.  SKIN: Skin is warm and dry. No rash noted. No erythema. No pallor.   EXTREMITIES: No cyanosis, no clubbing, has edema. No Deformity.  PSYCHIATRIC: abNormal mood, affect and behavior.  Reports depression and difficulty coping with his current reality      Lab Results     Last Comprehensive Metabolic Panel:  Lab Results   Component Value Date     01/16/2025    POTASSIUM 4.9 01/16/2025    CHLORIDE 106 01/16/2025    CO2 24 01/16/2025    ANIONGAP 9 01/16/2025     (H) 01/16/2025    BUN 20.3 01/16/2025    CR 1.24 (H) 01/16/2025    GFRESTIMATED 61 01/16/2025    MARAL 8.8 01/16/2025           Electronically signed by    Desiree Katz MD

## 2025-03-26 ENCOUNTER — NURSING HOME VISIT (OUTPATIENT)
Dept: GERIATRICS | Facility: CLINIC | Age: 75
End: 2025-03-26
Payer: COMMERCIAL

## 2025-03-26 DIAGNOSIS — Z98.890 STATUS POST CAROTID ENDARTERECTOMY: ICD-10-CM

## 2025-03-26 DIAGNOSIS — F34.1 PERSISTENT DEPRESSIVE DISORDER WITH ANXIOUS DISTRESS, CURRENTLY SEVERE: ICD-10-CM

## 2025-03-26 DIAGNOSIS — F33.1 MODERATE EPISODE OF RECURRENT MAJOR DEPRESSIVE DISORDER (H): ICD-10-CM

## 2025-03-26 DIAGNOSIS — I69.354 HEMIPARESIS AFFECTING LEFT SIDE AS LATE EFFECT OF CEREBROVASCULAR ACCIDENT (CVA) (H): ICD-10-CM

## 2025-03-26 DIAGNOSIS — E11.00 TYPE 2 DIABETES MELLITUS WITH HYPEROSMOLARITY WITHOUT COMA, UNSPECIFIED WHETHER LONG TERM INSULIN USE (H): Primary | ICD-10-CM

## 2025-03-26 DIAGNOSIS — Z86.73 HISTORY OF CVA (CEREBROVASCULAR ACCIDENT): ICD-10-CM

## 2025-03-26 DIAGNOSIS — R25.2 SPASTICITY AS LATE EFFECT OF CEREBROVASCULAR ACCIDENT (CVA): ICD-10-CM

## 2025-03-26 DIAGNOSIS — N18.32 STAGE 3B CHRONIC KIDNEY DISEASE (H): ICD-10-CM

## 2025-03-26 DIAGNOSIS — R13.10 DYSPHAGIA, UNSPECIFIED TYPE: ICD-10-CM

## 2025-03-26 DIAGNOSIS — I69.398 SPASTICITY AS LATE EFFECT OF CEREBROVASCULAR ACCIDENT (CVA): ICD-10-CM

## 2025-03-26 NOTE — LETTER
3/26/2025      Shaji GOLDBERG Shahrzad  [address] [place] [address]        [facility]       Patient Shaji Markham  MRN: [id number]        Reason for Visit     Chief Complaint   Patient presents with     USP Regulatory       Code Status     DNR only    Assessment     L carotid A stenosis s/p left carotid artery revascularization on 10/17/2024 [TCAR    Multivessel CAD-patient is felt to be a poor surgical candidate    Prior history of CVA post angiogram-right posterior cerebral artery  Etiology felt to be periprocedural  Has left sided hemiparesis.  Hyperlipidemia continue with his statins    Hypertension complicated by low blood pressures    Diabetes type 2   currently on oral agents.  CKD 3b-  Prior history of tobacco use currently abstaining  Mood disorder/adjustment reaction  Underlying history of vascular dementia with ongoing agitation  Chronic dysphagia  Was evaluated by SLP and recommendation is to continue with his modified diet on clear liquid with puréed portions-   diabetic neuropathy   Anemia-last Hg stable at 8.2    Generalized weakness    Patient is a resident of [facility] seen today for follow-up visit  At baseline has left-sided weakness and hemiparesis   Has not been able to progress to independence in spite of aggressive therapy  At baseline due to decline he has become wheelchair-bound he has a history of recurrent falls  Unfortunately remains bedbound and wheelchair-bound at baseline.  Mood has been stable and he has been taken off his quetiapine.  However intermittent agitation has been reported.  New behavior of requesting food from other people as well as putting inanimate objects in his mouth has been reported by staff.  They have doubled his food portions  Blood pressures are stable without medication  Monitor weight.  Recheck his routine labs including an A1c      History     Patient is a very pleasant 74 year old male who is a resident of long-term  care  Patient was initially admitted to the hospital with multivessel CAD  He has a history of CVA post angiogram with resultant left-sided weakness  [place] to be a poor surgical candidate and discharged on medical management  Course complicated by hypotension  Was in the TCU and now moved to long-term care  He was readmitted to the hospital with history of bilateral carotid artery stenosis.  He underwent TCAR on 10/17/2024  Postoperatively had some encephalopathy concerns which have resolved.  At baseline remains a poor historian  Also has s vascular dementia with some behavioral concerns he was recently taken of quetiapine  Staff reports agitation with him requesting more food he has been put on double portions due to that  Mood remains emotionally labile    Past Medical & Surgical History   Diabetes  Multivessel CAD  CKD      Past Social History     Reviewed,  has a past history of smoking denies alcohol use    Family History     Reviewed, and family history is not on file.    Medication List     Current Outpatient Medications   Medication Sig Dispense Refill     acetaminophen (TYLENOL) 325 MG tablet Take 650 mg by mouth 3 times daily       aspirin 81 MG EC tablet Take 81 mg by mouth daily       atorvastatin (LIPITOR) 80 MG tablet Take 80 mg by mouth at bedtime       carboxymethylcellulose PF (REFRESH LIQUIGEL) 1 % ophthalmic gel Place 1 drop into both eyes 2 times daily.       clopidogrel (PLAVIX) 75 MG tablet Take 75 mg by mouth daily       escitalopram (LEXAPRO) 20 MG tablet Take 1 tablet (20 mg) by mouth daily       gabapentin (NEURONTIN) 100 MG capsule Take 100 mg by mouth 2 times daily       isosorbide dinitrate (ISORDIL) 5 MG tablet Take 1 tablet (5 mg) by mouth 2 times daily       metFORMIN (GLUCOPHAGE) 500 MG tablet Take 500 mg by mouth daily (with breakfast)       nitroGLYcerin (NITROSTAT) 0.4 MG sublingual tablet Place 0.4 mg under the tongue every 5 minutes as needed for chest pain. For chest pain place  "1 tablet under the tongue every 5 minutes for 3 doses. If symptoms persist 5 minutes after 1st dose call 911.       senna-docusate (SENOKOT-S/PERICOLACE) 8.6-50 MG tablet Take 1 tablet by mouth 2 times daily       tiZANidine (ZANAFLEX) 2 MG capsule Take 2 mg by mouth daily       No current facility-administered medications for this visit.      MED REC REQUIRED  Post Medication Reconciliation Status: discharge medications reconciled, continue medications without change       Allergies     Allergies   Allergen Reactions     Latex Hives and Rash     Anesthetics, Lorna Rash     Wool Fiber Unknown       Review of Systems   A comprehensive review of 14 systems was done. Pertinent findings noted here and in history of present illness. All the rest negative.  Constitutional: Negative.  Negative for fever, chills, he has  activity change, appetite change and fatigue.  Dysphagia as improved   HENT: Negative for congestion and facial swelling.    Eyes: Negative for photophobia, redness and visual disturbance.   Respiratory: Negative for cough and chest tightness.    Cardiovascular: Negative for chest pain, palpitations and has leg swelling.   Gastrointestinal: Negative for nausea, diarrhea, constipation, blood in stool and abdominal distention.   Genitourinary: Negative.    Musculoskeletal: Reporting multiple falls  Has difficulty walking and using a wheelchair but walking in the parallel bars has an AFO due to foot drop  Skin: Negative.    Neurological: Negative for dizziness, tremors, syncope, weakness, light-headedness and headaches.  Agitated and some confusion noted  Reporting muscle spasms  Left-sided hemiparesis  Hematological: Does not bruise/bleed easily.   Psychiatric/Behavioral: Negative.  Mood is improved per patient  He has had several behavioral issues and gets very agitated and angry.        Physical Exam   /78   Pulse 68   Temp 98.1  F (36.7  C)   Resp 20   Ht 1.753 m (5' 9\")   Wt 73.5 kg (162 lb)   " SpO2 96%   BMI 23.92 kg/m       Constitutional: Oriented to person, place, and time and appears well-developed.   HEENT:  Normocephalic and atraumatic.  Eyes: Conjunctivae and EOM are normal. Pupils are equal, round, and reactive to light. No discharge.  No scleral icterus. Nose normal. Mouth/Throat: Oropharynx is clear and moist. No oropharyngeal exudate.    NECK: Normal range of motion. Neck supple. No JVD present. No tracheal deviation present. No thyromegaly present.   Surgical incision on the left side is healed  CARDIOVASCULAR: Normal rate, regular rhythm and intact distal pulses.  Exam reveals no gallop and no friction rub.  Systolic murmur present.  PULMONARY: Effort normal and breath sounds normal. No respiratory distress.No Wheezing or rales.  ABDOMEN: Soft. Bowel sounds are normal. No distension and no mass.  There is no tenderness. There is no rebound and no guarding. No HSM.  MUSCULOSKELETAL: Normal range of motion. Mild kyphosis, no tenderness.  LYMPH NODES: Has no cervical, supraclavicular, axillary and groin adenopathy.   NEUROLOGICAL: Alert and oriented to person, place, and time. No cranial nerve deficit.  Normal muscle tone. Coordination normal.   Left-sided weakness.  Also has an  AFO on the left foot  GENITOURINARY: Deferred exam.  SKIN: Skin is warm and dry. No rash noted. No erythema. No pallor.   EXTREMITIES: No cyanosis, no clubbing, has edema. No Deformity.  PSYCHIATRIC: abNormal mood, affect and behavior.  Reports depression and difficulty coping with his current reality      Lab Results     Last Comprehensive Metabolic Panel:  Lab Results   Component Value Date     01/16/2025    POTASSIUM 4.9 01/16/2025    CHLORIDE 106 01/16/2025    CO2 24 01/16/2025    ANIONGAP 9 01/16/2025     (H) 01/16/2025    BUN 20.3 01/16/2025    CR 1.24 (H) 01/16/2025    GFRESTIMATED 61 01/16/2025    MARAL 8.8 01/16/2025           Electronically signed by    Desiree Katz MD                             Sincerely,        GAURI Fitzgerald    Electronically signed

## 2025-05-12 ENCOUNTER — DOCUMENTATION ONLY (OUTPATIENT)
Dept: OTHER | Facility: CLINIC | Age: 75
End: 2025-05-12
Payer: COMMERCIAL

## 2025-05-14 ENCOUNTER — NURSING HOME VISIT (OUTPATIENT)
Dept: GERIATRICS | Facility: CLINIC | Age: 75
End: 2025-05-14
Payer: COMMERCIAL

## 2025-05-14 VITALS
HEART RATE: 60 BPM | HEIGHT: 69 IN | SYSTOLIC BLOOD PRESSURE: 163 MMHG | OXYGEN SATURATION: 98 % | RESPIRATION RATE: 18 BRPM | DIASTOLIC BLOOD PRESSURE: 79 MMHG | WEIGHT: 186 LBS | BODY MASS INDEX: 27.55 KG/M2 | TEMPERATURE: 97.6 F

## 2025-05-14 DIAGNOSIS — Z98.890 STATUS POST CAROTID ENDARTERECTOMY: ICD-10-CM

## 2025-05-14 DIAGNOSIS — I69.354 HEMIPARESIS AFFECTING LEFT SIDE AS LATE EFFECT OF CEREBROVASCULAR ACCIDENT (CVA) (H): ICD-10-CM

## 2025-05-14 DIAGNOSIS — Z86.73 HISTORY OF CVA (CEREBROVASCULAR ACCIDENT): Primary | ICD-10-CM

## 2025-05-14 DIAGNOSIS — N18.32 STAGE 3B CHRONIC KIDNEY DISEASE (H): ICD-10-CM

## 2025-05-14 DIAGNOSIS — I69.398 SPASTICITY AS LATE EFFECT OF CEREBROVASCULAR ACCIDENT (CVA): ICD-10-CM

## 2025-05-14 DIAGNOSIS — R25.2 SPASTICITY AS LATE EFFECT OF CEREBROVASCULAR ACCIDENT (CVA): ICD-10-CM

## 2025-05-14 DIAGNOSIS — Z00.00 ENCOUNTER FOR MEDICARE ANNUAL WELLNESS EXAM: ICD-10-CM

## 2025-05-14 DIAGNOSIS — E11.00 TYPE 2 DIABETES MELLITUS WITH HYPEROSMOLARITY WITHOUT COMA, UNSPECIFIED WHETHER LONG TERM INSULIN USE (H): ICD-10-CM

## 2025-05-14 DIAGNOSIS — R63.5 WEIGHT GAIN: ICD-10-CM

## 2025-05-14 DIAGNOSIS — F33.1 MODERATE EPISODE OF RECURRENT MAJOR DEPRESSIVE DISORDER (H): ICD-10-CM

## 2025-05-14 NOTE — LETTER
5/14/2025      Shaji Markham  [address] [place] [address]        Preventive Care Visit  [facility] GER[facility]AT[facility]  Essnece Conteh, BHAKTI, Nurse Practitioner  May 14, 2025      Assessment & Plan    Coronary artery disease of native artery of native heart with stable angina pectoris (H24)  Acute ischemic right posterior cerebral artery (PCA) stroke (H)  Spasticity  -Continue ASA, clopidogrel and statin.   -left side hemiparesis.   -wheelchair bound.   -Continue AFO splinting to left lower extremity.  -continue isosorbide 5 mg BID. (Hold for SBP < 95.)   -continues tizanidine 2 mg every day for spasticity.  -Continue gabapentin BID for neuropathic pain.     Carotid stenosis  -S/p left transcarotid artery revascularization for left carotid artery stenosis on 10/17/24.   -CTA post operatively was negative for acute stroke and showed a patent stent with expected compression in the midportion of the stent from heavily calcified plaque. There is a focal intimal flap related to access from the TCAR sheath. This is not hemodynamically significant. No further intervention recommended.   -Continue statin.   -ASA 81 mg daily.   -continue plavix 75 mg every day for one year  -Follow up for repeat surveillance ultrasound in 1 year.   -Follow up lipid and liver panel.      Post op anemia  -CTA showed relatively small retroperitoneal bleed. Hgb 11.2   -Follow up CBC.      Anxiety with depression  -increased restless and agitation.               -no longer on seroquel.   -Continue Lexapro 20 mg every day. Monitor symptoms.   -Follow up with ACP.  -No suicidal ideations today.      Dysphagia   -Pt failed initial swallow study along with repeat during hospitalization.  -dysphagia diet (clear liquid diet and/or small puree portions up to 4 oz      CKD  -Creatine was up to 1.67.   -Follow up BMP.        BMI  Estimated body mass index is 27.47 kg/m  as calculated from the following:    Height as of this encounter: 1.753 m  "(5' 9\").    Weight as of this encounter: 84.4 kg (186 lb).     Pt with weight gain of 16 lbs in 30 days. He was down ~ 25 lbs in December. RD following. Encourage weight loss.           Subjective  Vinny is a 74 year old, presenting for the following:  Wellness Visit      HPI    74 year old male with past medical history of that includes CAD with recurrent CVA, dysphagia, depression with anxiety, DM2 and CKD stage 3b.  Pt with > 70% stenosis of the right internal carotid artery and > 70% stenosis in the left internal carotid artery. Bilateral external carotid stenosis. Pt with left sided neglect. Hx of CVA with left sided weakness.  Patient underwent left transcarotid artery revascularization for left carotid artery stenosis on 10/17/24 by Dr. Ayon.  Postoperative patient developed some aphasia due to encephalopathy after anesthesia.  CTA was negative for acute stroke and showed a patent stent with expected compression in the midportion of the stent from heavily calcified plaque. Chronic left hemiplegia due to hx of CVA. Post op anemia with relatively small retroperitoneal bleed observed on CTA.  Hemoglobin has stabilized.  Patient has baseline dysphagia prior to surgery and has worked with SLP in the past. Pt failed initial swallow study along with repeat during hospitalization. SLP evaluation recommendations include dysphagia diet (clear liquid diet and/or small puree portions up to 4 oz based on pt/family/MD decision).         Annual Wellness Visit     Patient has been advised of split billing requirements and indicates understanding: No       Health Care Directive    Document on file is a Health Care Directive or POLST.  In general, how would you rate your overall physical health? (!) FAIR   Discussed with patient their rating of physical health; information has been provided.   Do you have a special diet?  Low fat/cholesterol and Diabetic      Do you see a dentist two times every year?  Yes  Have you been " more tired than usual lately?  (!) YES   Discussed possible causes of fatigue.   If you drink alcohol do you typically have >3 drinks per day or >7 drinks per week? No  Do you have a current opioid prescription? No  Do you use any other controlled substances or medications that are not prescribed by a provider? None  Social History     Tobacco Use     Smoking status: Unknown     Needs assistance for the following daily activities: (!) TRANSPORTATION  Which of the following safety concerns are present in your home?  none identified   Do you (or your family members) have any concerns about your safety while driving?  Does not drive.   Do you have any of the following hearing concerns?: No hearing concerns  In the past 6 months, have you been bothered by leaking of urine? Incontinent             5/15/2025   Fall Risk   Fallen 2 or more times in the past year? Yes   Trouble with walking or balance? Yes   Reason for decline CVA with left lamar       Today's PHQ-9 Score:        No data to display                ASCVD Risk   The ASCVD Risk score (Adonis CASTANO, et al., 2019) failed to calculate for the following reasons:    Risk score cannot be calculated because patient has a medical history suggesting prior/existing ASCVD    Reviewed and updated as needed this visit by Provider     Meds                Active Ambulatory Problems     Diagnosis Date Noted     Abnormal stress test 01/04/2024     Acute ischemic right posterior cerebral artery (PCA) stroke (H) 01/04/2024     Cerebrovascular accident (CVA) due to occlusion of left posterior cerebral artery (H) 07/04/2022     Type 2 diabetes mellitus (H) 02/16/2017     Tinnitus, bilateral 04/04/2022     Stage 3b chronic kidney disease (H) 03/13/2021     Onychomycosis of right great toe 04/05/2023     NAFLD (nonalcoholic fatty liver disease) 10/09/2019     Mixed dyslipidemia 05/05/2011     Elevated lipoprotein(a) 04/06/2022     Coronary artery disease of native artery of  native heart with stable angina pectoris 01/04/2024     Visceral obesity 11/13/2020     Periodontitis 12/06/2023     History of CVA (cerebrovascular accident) 03/10/2024     Hypotension 03/10/2024     Carotid stenosis 10/17/2024     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     CKD (chronic kidney disease) 10/17/2024     HTN (hypertension) 10/17/2024     Hemiparesis affecting left side as late effect of cerebrovascular accident (CVA) (H) 03/24/2025     Moderate episode of recurrent major depressive disorder (H) 03/24/2025     Resolved Ambulatory Problems     Diagnosis Date Noted     No Resolved Ambulatory Problems     No Additional Past Medical History       Current providers sharing in care for this patient include:  Patient Care Team:  Essence Conteh NP as PCP - General (Nurse Practitioner)  Martínez Ortiz MD as MD (OB/Gyn)  Essence Conteh NP as Nurse Practitioner (Nurse Practitioner)  Ana Kerr as Geriatric Services   [facility]  Desiree Katz MBBS (Family Medicine)  Essence Conteh NP as Assigned PCP    The following health maintenance items are reviewed in Epic and correct as of today:  Health Maintenance   Topic Date Due     LIPID  Never done     MICROALBUMIN  Never done     PARATHYROID  Never done     PHOSPHORUS  Never done     DIABETIC FOOT EXAM  Never done     DEPRESSION ACTION PLAN  Never done     PHQ-9  Never done     COLORECTAL CANCER SCREENING  01/02/2024     A1C  11/26/2024     EYE EXAM  04/01/2025     BMP  04/04/2025     COVID-19 Vaccine (8 - 2024-25 season) 05/30/2025     HEMOGLOBIN  01/16/2026     MEDICARE ANNUAL WELLNESS VISIT  05/14/2026     FALL RISK ASSESSMENT  05/14/2026     ADVANCE CARE PLANNING  05/12/2030     DTAP/TDAP/TD IMMUNIZATION (3 - Td or Tdap) 08/04/2031     HEPATITIS C SCREENING  Completed     INFLUENZA VACCINE  Completed     Pneumococcal Vaccine: 50+ Years  Completed     URINALYSIS  Completed     ALK PHOS  Completed      ZOSTER IMMUNIZATION  Completed     RSV VACCINE  Completed     HPV IMMUNIZATION  Aged Out     MENINGITIS IMMUNIZATION  Aged Out       Appropriate preventive services were discussed with this patient, including applicable screening as appropriate for fall prevention, nutrition, physical activity, Tobacco-use cessation, weight loss and cognition.  Checklist reviewing preventive services available has been given to the patient.          5/15/2025   Vision Screen   Reason Vision Screen Not Completed Attempted, unable to cooperate     How many servings of fruits and vegetables do you eat daily?  2-3  On average, how many sweetened beverages do you drink each day (Examples: soda, juice, sweet tea, etc.  Do NOT count diet or artificially sweetened beverages)?   3  How many days per week do you exercise enough to make your heart beat faster? 3 or less  How many minutes a day do you exercise enough to make your heart beat faster? 9 or less  How many days per week do you miss taking your medication? 0    Advance Care Planning  Document on file is a Health Care Directive or POLST.      Social History     Tobacco Use     Smoking status: Unknown     ASCVD Risk   The ASCVD Risk score (Adonis DK, et al., 2019) failed to calculate for the following reasons:    Risk score cannot be calculated because patient has a medical history suggesting prior/existing ASCVD      Reviewed and updated as needed this visit by Provider     Meds              Current providers sharing in care for this patient include:  Patient Care Team:  Essence Conteh NP as PCP - General (Nurse Practitioner)  Martínez Ortiz MD as MD (OB/Gyn)  Essence Conteh NP as Nurse Practitioner (Nurse Practitioner)  Ana Kerr as Geriatric Services   [facility]  Desiree Katz MBBS (Family Medicine)  Essence Conteh NP as Assigned PCP    The following health maintenance items are reviewed in Epic and  "correct as of today:  Health Maintenance   Topic Date Due     LIPID  Never done     MICROALBUMIN  Never done     PARATHYROID  Never done     PHOSPHORUS  Never done     DIABETIC FOOT EXAM  Never done     DEPRESSION ACTION PLAN  Never done     PHQ-9  Never done     COLORECTAL CANCER SCREENING  01/02/2024     A1C  11/26/2024     EYE EXAM  04/01/2025     BMP  04/04/2025     COVID-19 Vaccine (8 - 2024-25 season) 05/30/2025     HEMOGLOBIN  01/16/2026     MEDICARE ANNUAL WELLNESS VISIT  05/14/2026     FALL RISK ASSESSMENT  05/14/2026     ADVANCE CARE PLANNING  05/12/2030     DTAP/TDAP/TD IMMUNIZATION (3 - Td or Tdap) 08/04/2031     HEPATITIS C SCREENING  Completed     INFLUENZA VACCINE  Completed     Pneumococcal Vaccine: 50+ Years  Completed     URINALYSIS  Completed     ALK PHOS  Completed     ZOSTER IMMUNIZATION  Completed     RSV VACCINE  Completed     HPV IMMUNIZATION  Aged Out     MENINGITIS IMMUNIZATION  Aged Out            Objective   Exam  BP (!) 163/79   Pulse 60   Temp 97.6  F (36.4  C)   Resp 18   Ht 1.753 m (5' 9\")   Wt 84.4 kg (186 lb)   SpO2 98%   BMI 27.47 kg/m     Estimated body mass index is 27.47 kg/m  as calculated from the following:    Height as of this encounter: 1.753 m (5' 9\").    Weight as of this encounter: 84.4 kg (186 lb).    Physical Exam  BP (!) 163/79   Pulse 60   Temp 97.6  F (36.4  C)   Resp 18   Ht 1.753 m (5' 9\")   Wt 84.4 kg (186 lb)   SpO2 98%   BMI 27.47 kg/m      General: Awake, Alert, oriented x3, lying in bed, follows simple commands, conversant  HEENT:PERRLA, Pink conjunctiva, moist oral mucosa, tongue is midline, facial symmetry.   NECK: Supple.   CVS:  S1  S2, without murmur or gallop.   LUNG: Clear to auscultation, No wheezes, rales or rhonci.  BACK: No kyphosis of the thoracic spine  ABDOMEN: Soft, nontender to palpation, with positive bowel sounds  EXTREMITIES: Left sided neglect. no pedal edema, no calf tenderness  SKIN: Warm and dry  NEUROLOGIC: see above. "   PSYCHIATRIC:Cognitive impairment noted.     PHQ-9= 1/27 Indicating minimal depressive symptoms on 4/4/25  BIMS=13/15 on 4/4/25             5/15/2025   Vision Screen   Reason Vision Screen Not Completed Attempted, unable to cooperate       Signed Electronically by: Essence Conteh NP      Sincerely,        Essence Conteh NP    Electronically signed

## 2025-05-14 NOTE — PROGRESS NOTES
"Preventive Care Visit  [facility] GERIATRIC SERVICES  Essence Conteh, BHAKTI, Nurse Practitioner  May 14, 2025      Assessment & Plan     Coronary artery disease of native artery of native heart with stable angina pectoris (H24)  Acute ischemic right posterior cerebral artery (PCA) stroke (H)  Spasticity  -Continue ASA, clopidogrel and statin.   -left side hemiparesis.   -wheelchair bound.   -Continue AFO splinting to left lower extremity.  -continue isosorbide 5 mg BID. (Hold for SBP < 95.)   -continues tizanidine 2 mg every day for spasticity.  -Continue gabapentin BID for neuropathic pain.     Carotid stenosis  -S/p left transcarotid artery revascularization for left carotid artery stenosis on 10/17/24.   -CTA post operatively was negative for acute stroke and showed a patent stent with expected compression in the midportion of the stent from heavily calcified plaque. There is a focal intimal flap related to access from the TCAR sheath. This is not hemodynamically significant. No further intervention recommended.   -Continue statin.   -ASA 81 mg daily.   -continue plavix 75 mg every day for one year  -Follow up for repeat surveillance ultrasound in 1 year.   -Follow up lipid and liver panel.      Post op anemia  -CTA showed relatively small retroperitoneal bleed. Hgb 11.2   -Follow up CBC.      Anxiety with depression  -increased restless and agitation.               -no longer on seroquel.   -Continue Lexapro 20 mg every day. Monitor symptoms.   -Follow up with ACP.  -No suicidal ideations today.      Dysphagia   -Pt failed initial swallow study along with repeat during hospitalization.  -dysphagia diet (clear liquid diet and/or small puree portions up to 4 oz      CKD  -Creatine was up to 1.67.   -Follow up BMP.        BMI  Estimated body mass index is 27.47 kg/m  as calculated from the following:    Height as of this encounter: 1.753 m (5' 9\").    Weight as of this encounter: 84.4 kg (186 lb).     Pt with " weight gain of 16 lbs in 30 days. He was down ~ 25 lbs in December. RD following. Encourage weight loss.           Subjective   Vinny is a 74 year old, presenting for the following:  Wellness Visit      HPI    74 year old male with past medical history of that includes CAD with recurrent CVA, dysphagia, depression with anxiety, DM2 and CKD stage 3b.  Pt with > 70% stenosis of the right internal carotid artery and > 70% stenosis in the left internal carotid artery. Bilateral external carotid stenosis. Pt with left sided neglect. Hx of CVA with left sided weakness.  Patient underwent left transcarotid artery revascularization for left carotid artery stenosis on 10/17/24 by Dr. Ayon.  Postoperative patient developed some aphasia due to encephalopathy after anesthesia.  CTA was negative for acute stroke and showed a patent stent with expected compression in the midportion of the stent from heavily calcified plaque. Chronic left hemiplegia due to hx of CVA. Post op anemia with relatively small retroperitoneal bleed observed on CTA.  Hemoglobin has stabilized.  Patient has baseline dysphagia prior to surgery and has worked with SLP in the past. Pt failed initial swallow study along with repeat during hospitalization. SLP evaluation recommendations include dysphagia diet (clear liquid diet and/or small puree portions up to 4 oz based on pt/family/MD decision).         Annual Wellness Visit     Patient has been advised of split billing requirements and indicates understanding: No       Health Care Directive    Document on file is a Health Care Directive or POLST.  In general, how would you rate your overall physical health? (!) FAIR   Discussed with patient their rating of physical health; information has been provided.   Do you have a special diet?  Low fat/cholesterol and Diabetic      Do you see a dentist two times every year?  Yes  Have you been more tired than usual lately?  (!) YES   Discussed possible causes of  fatigue.   If you drink alcohol do you typically have >3 drinks per day or >7 drinks per week? No  Do you have a current opioid prescription? No  Do you use any other controlled substances or medications that are not prescribed by a provider? None  Social History     Tobacco Use    Smoking status: Unknown     Needs assistance for the following daily activities: (!) TRANSPORTATION  Which of the following safety concerns are present in your home?  none identified   Do you (or your family members) have any concerns about your safety while driving?  Does not drive.   Do you have any of the following hearing concerns?: No hearing concerns  In the past 6 months, have you been bothered by leaking of urine? Incontinent             5/15/2025   Fall Risk   Fallen 2 or more times in the past year? Yes   Trouble with walking or balance? Yes   Reason for decline CVA with left lamar       Today's PHQ-9 Score:        No data to display                ASCVD Risk   The ASCVD Risk score (Adonis CASTANO, et al., 2019) failed to calculate for the following reasons:    Risk score cannot be calculated because patient has a medical history suggesting prior/existing ASCVD    Reviewed and updated as needed this visit by Provider     Meds                Active Ambulatory Problems     Diagnosis Date Noted    Abnormal stress test 01/04/2024    Acute ischemic right posterior cerebral artery (PCA) stroke (H) 01/04/2024    Cerebrovascular accident (CVA) due to occlusion of left posterior cerebral artery (H) 07/04/2022    Type 2 diabetes mellitus (H) 02/16/2017    Tinnitus, bilateral 04/04/2022    Stage 3b chronic kidney disease (H) 03/13/2021    Onychomycosis of right great toe 04/05/2023    NAFLD (nonalcoholic fatty liver disease) 10/09/2019    Mixed dyslipidemia 05/05/2011    Elevated lipoprotein(a) 04/06/2022    Coronary artery disease of native artery of native heart with stable angina pectoris 01/04/2024    Visceral obesity 11/13/2020     Periodontitis 12/06/2023    History of CVA (cerebrovascular accident) 03/10/2024    Hypotension 03/10/2024    Carotid stenosis 10/17/2024    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    CKD (chronic kidney disease) 10/17/2024    HTN (hypertension) 10/17/2024    Hemiparesis affecting left side as late effect of cerebrovascular accident (CVA) (H) 03/24/2025    Moderate episode of recurrent major depressive disorder (H) 03/24/2025     Resolved Ambulatory Problems     Diagnosis Date Noted    No Resolved Ambulatory Problems     No Additional Past Medical History       Current providers sharing in care for this patient include:  Patient Care Team:  Essence Conteh NP as PCP - General (Nurse Practitioner)  Martínez Ortiz MD as MD (OB/Gyn)  Essence Conteh NP as Nurse Practitioner (Nurse Practitioner)  Ana Kerr as Geriatric Services   [facility]  Desiree Katz MBBS (Family Medicine)  Essence Conteh NP as Assigned PCP    The following health maintenance items are reviewed in Epic and correct as of today:  Health Maintenance   Topic Date Due    LIPID  Never done    MICROALBUMIN  Never done    PARATHYROID  Never done    PHOSPHORUS  Never done    DIABETIC FOOT EXAM  Never done    DEPRESSION ACTION PLAN  Never done    PHQ-9  Never done    COLORECTAL CANCER SCREENING  01/02/2024    A1C  11/26/2024    EYE EXAM  04/01/2025    BMP  04/04/2025    COVID-19 Vaccine (8 - 2024-25 season) 05/30/2025    HEMOGLOBIN  01/16/2026    MEDICARE ANNUAL WELLNESS VISIT  05/14/2026    FALL RISK ASSESSMENT  05/14/2026    ADVANCE CARE PLANNING  05/12/2030    DTAP/TDAP/TD IMMUNIZATION (3 - Td or Tdap) 08/04/2031    HEPATITIS C SCREENING  Completed    INFLUENZA VACCINE  Completed    Pneumococcal Vaccine: 50+ Years  Completed    URINALYSIS  Completed    ALK PHOS  Completed    ZOSTER IMMUNIZATION  Completed    RSV VACCINE  Completed    HPV IMMUNIZATION  Aged Out    MENINGITIS IMMUNIZATION   Aged Out       Appropriate preventive services were discussed with this patient, including applicable screening as appropriate for fall prevention, nutrition, physical activity, Tobacco-use cessation, weight loss and cognition.  Checklist reviewing preventive services available has been given to the patient.          5/15/2025   Vision Screen   Reason Vision Screen Not Completed Attempted, unable to cooperate     How many servings of fruits and vegetables do you eat daily?  2-3  On average, how many sweetened beverages do you drink each day (Examples: soda, juice, sweet tea, etc.  Do NOT count diet or artificially sweetened beverages)?   3  How many days per week do you exercise enough to make your heart beat faster? 3 or less  How many minutes a day do you exercise enough to make your heart beat faster? 9 or less  How many days per week do you miss taking your medication? 0    Advance Care Planning  Document on file is a Health Care Directive or POLST.      Social History     Tobacco Use    Smoking status: Unknown     ASCVD Risk   The ASCVD Risk score (Adonis DK, et al., 2019) failed to calculate for the following reasons:    Risk score cannot be calculated because patient has a medical history suggesting prior/existing ASCVD      Reviewed and updated as needed this visit by Provider     Meds              Current providers sharing in care for this patient include:  Patient Care Team:  Essence Conteh NP as PCP - General (Nurse Practitioner)  Martínez Ortiz MD as MD (OB/Gyn)  Essence Conteh NP as Nurse Practitioner (Nurse Practitioner)  Ana Kerr as Geriatric Services   [facility]  Desiree Katz MBBS (Family Medicine)  Essence Conteh NP as Assigned PCP    The following health maintenance items are reviewed in Epic and correct as of today:  Health Maintenance   Topic Date Due    LIPID  Never done    MICROALBUMIN  Never done    PARATHYROID  Never  "done    PHOSPHORUS  Never done    DIABETIC FOOT EXAM  Never done    DEPRESSION ACTION PLAN  Never done    PHQ-9  Never done    COLORECTAL CANCER SCREENING  01/02/2024    A1C  11/26/2024    EYE EXAM  04/01/2025    BMP  04/04/2025    COVID-19 Vaccine (8 - 2024-25 season) 05/30/2025    HEMOGLOBIN  01/16/2026    MEDICARE ANNUAL WELLNESS VISIT  05/14/2026    FALL RISK ASSESSMENT  05/14/2026    ADVANCE CARE PLANNING  05/12/2030    DTAP/TDAP/TD IMMUNIZATION (3 - Td or Tdap) 08/04/2031    HEPATITIS C SCREENING  Completed    INFLUENZA VACCINE  Completed    Pneumococcal Vaccine: 50+ Years  Completed    URINALYSIS  Completed    ALK PHOS  Completed    ZOSTER IMMUNIZATION  Completed    RSV VACCINE  Completed    HPV IMMUNIZATION  Aged Out    MENINGITIS IMMUNIZATION  Aged Out            Objective    Exam  BP (!) 163/79   Pulse 60   Temp 97.6  F (36.4  C)   Resp 18   Ht 1.753 m (5' 9\")   Wt 84.4 kg (186 lb)   SpO2 98%   BMI 27.47 kg/m     Estimated body mass index is 27.47 kg/m  as calculated from the following:    Height as of this encounter: 1.753 m (5' 9\").    Weight as of this encounter: 84.4 kg (186 lb).    Physical Exam  BP (!) 163/79   Pulse 60   Temp 97.6  F (36.4  C)   Resp 18   Ht 1.753 m (5' 9\")   Wt 84.4 kg (186 lb)   SpO2 98%   BMI 27.47 kg/m      General: Awake, Alert, oriented x3, lying in bed, follows simple commands, conversant  HEENT:PERRLA, Pink conjunctiva, moist oral mucosa, tongue is midline, facial symmetry.   NECK: Supple.   CVS:  S1  S2, without murmur or gallop.   LUNG: Clear to auscultation, No wheezes, rales or rhonci.  BACK: No kyphosis of the thoracic spine  ABDOMEN: Soft, nontender to palpation, with positive bowel sounds  EXTREMITIES: Left sided neglect. no pedal edema, no calf tenderness  SKIN: Warm and dry  NEUROLOGIC: see above.   PSYCHIATRIC:Cognitive impairment noted.     PHQ-9= 1/27 Indicating minimal depressive symptoms on 4/4/25  BIMS=13/15 on 4/4/25             5/15/2025 "   Vision Screen   Reason Vision Screen Not Completed Attempted, unable to cooperate       Signed Electronically by: Essence Conteh, NP

## 2025-05-15 ENCOUNTER — LAB REQUISITION (OUTPATIENT)
Dept: LAB | Facility: CLINIC | Age: 75
End: 2025-05-15
Payer: COMMERCIAL

## 2025-05-15 DIAGNOSIS — N17.9 ACUTE KIDNEY FAILURE, UNSPECIFIED: ICD-10-CM

## 2025-05-15 ASSESSMENT — ACTIVITIES OF DAILY LIVING (ADL): CURRENT_FUNCTION: NEEDS ASSISTANCE

## 2025-05-16 NOTE — PATIENT INSTRUCTIONS
Patient Education   Preventive Care Advice   This is general advice given by our system to help you stay healthy. However, your care team may have specific advice just for you. Please talk to your care team about your preventive care needs.  Nutrition  Eat 5 or more servings of fruits and vegetables each day.  Try wheat bread, brown rice and whole grain pasta (instead of white bread, rice, and pasta).  Get enough calcium and vitamin D. Check the label on foods and aim for 100% of the RDA (recommended daily allowance).  Lifestyle  Exercise at least 150 minutes each week  (30 minutes a day, 5 days a week).  Do muscle strengthening activities 2 days a week. These help control your weight and prevent disease.  No smoking.  Wear sunscreen to prevent skin cancer.  Have a dental exam and cleaning every 6 months.  Yearly exams  See your health care team every year to talk about:  Any changes in your health.  Any medicines your care team has prescribed.  Preventive care, family planning, and ways to prevent chronic diseases.  Shots (vaccines)   HPV shots (up to age 26), if you've never had them before.  Hepatitis B shots (up to age 59), if you've never had them before.  COVID-19 shot: Get this shot when it's due.  Flu shot: Get a flu shot every year.  Tetanus shot: Get a tetanus shot every 10 years.  Pneumococcal, hepatitis A, and RSV shots: Ask your care team if you need these based on your risk.  Shingles shot (for age 50 and up)  General health tests  Diabetes screening:  Starting at age 35, Get screened for diabetes at least every 3 years.  If you are younger than age 35, ask your care team if you should be screened for diabetes.  Cholesterol test: At age 39, start having a cholesterol test every 5 years, or more often if advised.  Bone density scan (DEXA): At age 50, ask your care team if you should have this scan for osteoporosis (brittle bones).  Hepatitis C: Get tested at least once in your life.  STIs (sexually  transmitted infections)  Before age 24: Ask your care team if you should be screened for STIs.  After age 24: Get screened for STIs if you're at risk. You are at risk for STIs (including HIV) if:  You are sexually active with more than one person.  You don't use condoms every time.  You or a partner was diagnosed with a sexually transmitted infection.  If you are at risk for HIV, ask about PrEP medicine to prevent HIV.  Get tested for HIV at least once in your life, whether you are at risk for HIV or not.  Cancer screening tests  Cervical cancer screening: If you have a cervix, begin getting regular cervical cancer screening tests starting at age 21.  Breast cancer scan (mammogram): If you've ever had breasts, begin having regular mammograms starting at age 40. This is a scan to check for breast cancer.  Colon cancer screening: It is important to start screening for colon cancer at age 45.  Have a colonoscopy test every 10 years (or more often if you're at risk) Or, ask your provider about stool tests like a FIT test every year or Cologuard test every 3 years.  To learn more about your testing options, visit:   .  For help making a decision, visit:   https://bit.ly/hi19324.  Prostate cancer screening test: If you have a prostate, ask your care team if a prostate cancer screening test (PSA) at age 55 is right for you.  Lung cancer screening: If you are a current or former smoker ages 50 to 80, ask your care team if ongoing lung cancer screenings are right for you.  For informational purposes only. Not to replace the advice of your health care provider. Copyright   2023 [employer]. All rights reserved. Clinically reviewed by the [facility]. [employer] [id number] - REV 01/24.

## 2025-05-19 LAB
ANION GAP SERPL CALCULATED.3IONS-SCNC: 11 MMOL/L (ref 7–15)
BUN SERPL-MCNC: 22.3 MG/DL (ref 8–23)
CALCIUM SERPL-MCNC: 9.3 MG/DL (ref 8.8–10.4)
CHLORIDE SERPL-SCNC: 104 MMOL/L (ref 98–107)
CREAT SERPL-MCNC: 1.27 MG/DL (ref 0.67–1.17)
EGFRCR SERPLBLD CKD-EPI 2021: 59 ML/MIN/1.73M2
ERYTHROCYTE [DISTWIDTH] IN BLOOD BY AUTOMATED COUNT: 12.6 % (ref 10–15)
EST. AVERAGE GLUCOSE BLD GHB EST-MCNC: 157 MG/DL
GLUCOSE SERPL-MCNC: 148 MG/DL (ref 70–99)
HBA1C MFR BLD: 7.1 %
HCO3 SERPL-SCNC: 25 MMOL/L (ref 22–29)
HCT VFR BLD AUTO: 36.6 % (ref 40–53)
HGB BLD-MCNC: 11.7 G/DL (ref 13.3–17.7)
MCH RBC QN AUTO: 31.7 PG (ref 26.5–33)
MCHC RBC AUTO-ENTMCNC: 32 G/DL (ref 31.5–36.5)
MCV RBC AUTO: 99 FL (ref 78–100)
PLATELET # BLD AUTO: 278 10E3/UL (ref 150–450)
POTASSIUM SERPL-SCNC: 4.9 MMOL/L (ref 3.4–5.3)
RBC # BLD AUTO: 3.69 10E6/UL (ref 4.4–5.9)
SODIUM SERPL-SCNC: 140 MMOL/L (ref 135–145)
WBC # BLD AUTO: 8.4 10E3/UL (ref 4–11)

## 2025-05-19 PROCEDURE — 80048 BASIC METABOLIC PNL TOTAL CA: CPT | Mod: ORL | Performed by: NURSE PRACTITIONER

## 2025-05-19 PROCEDURE — P9604 ONE-WAY ALLOW PRORATED TRIP: HCPCS | Mod: ORL | Performed by: NURSE PRACTITIONER

## 2025-05-19 PROCEDURE — 36415 COLL VENOUS BLD VENIPUNCTURE: CPT | Mod: ORL | Performed by: NURSE PRACTITIONER

## 2025-05-19 PROCEDURE — 83036 HEMOGLOBIN GLYCOSYLATED A1C: CPT | Mod: ORL | Performed by: NURSE PRACTITIONER

## 2025-05-19 PROCEDURE — 85027 COMPLETE CBC AUTOMATED: CPT | Mod: ORL | Performed by: NURSE PRACTITIONER

## 2025-07-15 ENCOUNTER — NURSING HOME VISIT (OUTPATIENT)
Dept: GERIATRICS | Facility: CLINIC | Age: 75
End: 2025-07-15
Payer: COMMERCIAL

## 2025-07-15 ENCOUNTER — LAB REQUISITION (OUTPATIENT)
Dept: LAB | Facility: CLINIC | Age: 75
End: 2025-07-15
Payer: COMMERCIAL

## 2025-07-15 VITALS
HEART RATE: 55 BPM | WEIGHT: 195 LBS | OXYGEN SATURATION: 98 % | DIASTOLIC BLOOD PRESSURE: 67 MMHG | SYSTOLIC BLOOD PRESSURE: 128 MMHG | TEMPERATURE: 98.2 F | BODY MASS INDEX: 28.8 KG/M2 | RESPIRATION RATE: 18 BRPM

## 2025-07-15 DIAGNOSIS — R63.5 WEIGHT GAIN: ICD-10-CM

## 2025-07-15 DIAGNOSIS — R25.2 SPASTICITY AS LATE EFFECT OF CEREBROVASCULAR ACCIDENT (CVA): ICD-10-CM

## 2025-07-15 DIAGNOSIS — E11.00 TYPE 2 DIABETES MELLITUS WITH HYPEROSMOLARITY WITHOUT COMA, UNSPECIFIED WHETHER LONG TERM INSULIN USE (H): ICD-10-CM

## 2025-07-15 DIAGNOSIS — K59.01 SLOW TRANSIT CONSTIPATION: ICD-10-CM

## 2025-07-15 DIAGNOSIS — Z86.73 HISTORY OF CVA (CEREBROVASCULAR ACCIDENT): ICD-10-CM

## 2025-07-15 DIAGNOSIS — I69.398 SPASTICITY AS LATE EFFECT OF CEREBROVASCULAR ACCIDENT (CVA): ICD-10-CM

## 2025-07-15 DIAGNOSIS — I95.9 HYPOTENSION, UNSPECIFIED: ICD-10-CM

## 2025-07-15 DIAGNOSIS — I69.354 HEMIPARESIS AFFECTING LEFT SIDE AS LATE EFFECT OF CEREBROVASCULAR ACCIDENT (CVA) (H): Primary | ICD-10-CM

## 2025-07-15 PROCEDURE — 99309 SBSQ NF CARE MODERATE MDM 30: CPT | Performed by: NURSE PRACTITIONER

## 2025-07-15 RX ORDER — AMOXICILLIN 250 MG
1 CAPSULE ORAL DAILY
Status: SHIPPED
Start: 2025-07-15

## 2025-07-15 NOTE — PROGRESS NOTES
[facility] GERIATRICS  ACUTE/EPISODIC VISIT    [facility] Medical Record Number:  [id number]  Place of Service where encounter took place:  [facility] () [[id number]]    Chief Complaint   Patient presents with    RECHECK       HPI:    Shaji Markham is a 75 year old  (1950), who is being seen today for an episodic care visit.  HPI information obtained from: facility chart records, facility staff, patient report, [facility] chart review, and Care Everywhere [facility] chart review.    Today's concern is:    Diagnoses         Codes Comments      Hemiparesis affecting left side as late effect of cerebrovascular accident (CVA) (H)    -  Primary I69.354       History of CVA (cerebrovascular accident)     Z86.73       Spasticity as late effect of cerebrovascular accident (CVA)     I69.398, R25.2       Type 2 diabetes mellitus with hyperosmolarity without coma, unspecified whether long term insulin use (H)     E11.00       Weight gain     R63.5       Slow transit constipation     K59.01           Came to meet Vinny today as he moved up to the [facility] memory care unit last week due to wandering.  He has resided at [facility] since 1/12/2024.      Vinny's daughter here today visiting and she has POA over Vinny.  Wanted to meet her before she left.      Reviewed medications.  Talked about constipation.  Weight gain in the past and instead of twice a week check, did cut the weight checked down to weekly with bath days as he is stable.  Has put on 40 lbs he stated.    Daughter asked if a handicap plague application could get filled out.  Printed one off today and handed it to her prior to her leaving.  He does not drive, but family takes him out.  He wants his license, but daughter soothed him to say it is not a good idea as he reflexes are poor and high risk of hurting self and others.      Daughter asked opinion of having a colonoscopy as she gets MyChart reminders for her father.  Feel he would  not tolerate the prep.  At this point would monitor for bleeding and treat accordingly.  Do not know if there is a place to delcine the colonoscopy when they send out the reminder.  Spoke about a PPI as he is on ASA and Plavix today and most likely for life.    Lastly, daughter inquired about receiving another neurology referral.  Previous NP helped with this but daughter let the appointment set up lapse.  Will drop another referral into [facility].  Mainly to see about his tremors, old CVA.        ALLERGIES:    Allergies   Allergen Reactions    Latex Hives and Rash    Anesthetics, Lorna Rash    Wool Fiber Unknown        MEDICATIONS:  Post Discharge Medication Reconciliation Status: patient was not discharged from an inpatient facility or TCU.     Current Outpatient Medications   Medication Sig Dispense Refill    acetaminophen (TYLENOL) 325 MG tablet Take 650 mg by mouth 3 times daily      aspirin 81 MG EC tablet Take 81 mg by mouth daily      atorvastatin (LIPITOR) 80 MG tablet Take 80 mg by mouth at bedtime      carboxymethylcellulose PF (REFRESH LIQUIGEL) 1 % ophthalmic gel Place 1 drop into both eyes 2 times daily.      clopidogrel (PLAVIX) 75 MG tablet Take 75 mg by mouth daily      escitalopram (LEXAPRO) 20 MG tablet Take 1 tablet (20 mg) by mouth daily      gabapentin (NEURONTIN) 100 MG capsule Take 100 mg by mouth 2 times daily      isosorbide dinitrate (ISORDIL) 5 MG tablet Take 1 tablet (5 mg) by mouth 2 times daily      metFORMIN (GLUCOPHAGE) 500 MG tablet Take 500 mg by mouth daily (with breakfast)      nitroGLYcerin (NITROSTAT) 0.4 MG sublingual tablet Place 0.4 mg under the tongue every 5 minutes as needed for chest pain. For chest pain place 1 tablet under the tongue every 5 minutes for 3 doses. If symptoms persist 5 minutes after 1st dose call 911.      senna-docusate (SENOKOT-S/PERICOLACE) 8.6-50 MG tablet Take 1 tablet by mouth daily.      tiZANidine (ZANAFLEX) 2 MG capsule Take 2 mg by mouth daily          REVIEW OF SYSTEMS:  4 point ROS neg other than the symptoms noted above in the HPI.      PHYSICAL EXAM:  /67   Pulse 55   Temp 98.2  F (36.8  C)   Resp 18   Wt 88.5 kg (195 lb)   SpO2 98%   BMI 28.80 kg/m    Physical Exam  Vitals and nursing note reviewed.   Constitutional:       Comments: Sitting in wheelchair, alert and oriented to person, place and time being slightly vague.  Obvious tremor in right leg.   HENT:      Head: Normocephalic.      Right Ear: External ear normal.      Left Ear: External ear normal.      Mouth/Throat:      Mouth: Mucous membranes are moist.      Pharynx: Oropharynx is clear.   Eyes:      Comments: Both eyes appear to be symmetrical.  Sclera's clear.     Cardiovascular:      Rate and Rhythm: Normal rate and regular rhythm.   Pulmonary:      Effort: Pulmonary effort is normal.      Breath sounds: Normal breath sounds.   Abdominal:      General: Bowel sounds are normal.      Palpations: Abdomen is soft.   Musculoskeletal:      Cervical back: Normal range of motion.      Comments: Right leg is shaking at rest and then quiets when moving his arms around.    Left extremities affected by stroke.  Will take his right hand and stretch out his fingers and lift arm up for exercises.  Not able to move left arm above his head   Skin:     General: Skin is warm and dry.      Comments: Not wearing socks and looking at his lower legs he has many scars from open areas.  Near left ankle he has two areas that appear to be healing and scabbed.   Neurological:      Mental Status: He is alert and oriented to person, place, and time.      Comments: No facial drooping.  Feeding self with fork and stabbing food without issues with right hand.  No coughing as chewing.  Minor forgetfulness noted in visit   Psychiatric:         Behavior: Behavior normal.         Thought Content: Thought content normal.      Comments: Wants to have his 's license but no way could he operate a car.  Family  brings him to appointments and events.    Have not heard about any behaviors yet.       ASSESSMENT / PLAN:  (I69.354) Hemiparesis affecting left side as late effect of cerebrovascular accident (CVA) (H)  (primary encounter diagnosis)  (Z86.73) History of CVA (cerebrovascular accident)  (I69.398,  R25.2) Spasticity as late effect of cerebrovascular accident (CVA)  Comment: remains on ASA and Plavix.  Talked about this for a while and risk of bleeding, stomach ulcers as he is not on a PPI.  No bleeding noted.  Nose had bled a little as evident with a smear of blood on a finger.  Otherwise no new symptoms.   Does take a statin at high dose.  Atorvastatin 80mg daily.  Will check a lipid panel next lab day  Will refer to Neurology for stroke and spasms of right leg.  This is daughter's request.  Last referral  and so she asked for help to get a new referral.  Plan: Adult Neurology  Referral      (E11.00) Type 2 diabetes mellitus with hyperosmolarity without coma, unspecified whether long term insulin use (H)  Comment: currently on Metformin only.  No sugar checks.  Following A1c and due for a lab.  Will order this and CBC, BMP next lab day.    (R63.5) Weight gain  Comment: weights are done twice/three times a week and staff asking if could be reduced.  Went back to the daughter to ask her this and she stated that he had gained a lot of weight like 40 lbs.  All recent weights stable 195 lbs  Will reduce to weekly weight with bath day.  Now eats 3 meals a day and not active for exercise to make a difference.  Dietary had cut down portions to 1.5 with meals vs the 2-3 portions he was receiving according to the daughter.     (K59.01) Slow transit constipation  Comment: last week took him off his senna-S as large loose stool.  Now talking today he states he may only go once a week.  Did have a vagus vagal response already within the month?  Stressed to him that this NP did not want him to strain for his  bowels.  Was on 2 tabs before but will restart him at 1 tab daily.    Plan: senna-docusate (SENOKOT-S/PERICOLACE) 8.6-50 MG        tablet     Orders:  Senna-S 1 tab po daily for constipation.    Printed out a Handicap plague form, filled out and given to daughter  Neurology consult dropped into [facility].  Next lab day, CBC, BMP A1c and lipid panel - type 2 DM and old CVA    Electronically signed by  ALEXANDRA Mejia CNP

## 2025-07-15 NOTE — LETTER
7/15/2025      Shaji Markham  [address] [place] [address]        [facility] GERIATRICS  ACUTE/EPISODIC VISIT    [facility] Medical Record Number:  [id number]  Place of Service where encounter took place:  [facility] () [[id number]]    Chief Complaint   Patient presents with     RECHECK       HPI:    Shaji Markham is a 75 year old  (1950), who is being seen today for an episodic care visit.  HPI information obtained from: facility chart records, facility staff, patient report, [facility] chart review, and Care Everywhere [facility] chart review.    Today's concern is:    Diagnoses         Codes Comments      Hemiparesis affecting left side as late effect of cerebrovascular accident (CVA) (H)    -  Primary I69.354       History of CVA (cerebrovascular accident)     Z86.73       Spasticity as late effect of cerebrovascular accident (CVA)     I69.398, R25.2       Type 2 diabetes mellitus with hyperosmolarity without coma, unspecified whether long term insulin use (H)     E11.00       Weight gain     R63.5       Slow transit constipation     K59.01           Came to meet Vinny today as he moved up to the [facility] memory care unit last week due to wandering.  He has resided at [place] since 1/12/2024.      Vinny's daughter here today visiting and she has POA over Vinny.  Wanted to meet her before she left.      Reviewed medications.  Talked about constipation.  Weight gain in the past and instead of twice a week check, did cut the weight checked down to weekly with bath days as he is stable.  Has put on 40 lbs he stated.    Daughter asked if a handicap plague application could get filled out.  Printed one off today and handed it to her prior to her leaving.  He does not drive, but family takes him out.  He wants his license, but daughter soothed him to say it is not a good idea as he reflexes are poor and high risk of hurting self and others.      Daughter asked opinion of having  a colonoscopy as she gets MyChart reminders for her father.  Feel he would not tolerate the prep.  At this point would monitor for bleeding and treat accordingly.  Do not know if there is a place to delcine the colonoscopy when they send out the reminder.  Spoke about a PPI as he is on ASA and Plavix today and most likely for life.    Lastly, daughter inquired about receiving another neurology referral.  Previous NP helped with this but daughter let the appointment set up lapse.  Will drop another referral into [facility].  Mainly to see about his tremors, old CVA.        ALLERGIES:    Allergies   Allergen Reactions     Latex Hives and Rash     Anesthetics, Lorna Rash     Wool Fiber Unknown        MEDICATIONS:  Post Discharge Medication Reconciliation Status: patient was not discharged from an inpatient facility or TCU.     Current Outpatient Medications   Medication Sig Dispense Refill     acetaminophen (TYLENOL) 325 MG tablet Take 650 mg by mouth 3 times daily       aspirin 81 MG EC tablet Take 81 mg by mouth daily       atorvastatin (LIPITOR) 80 MG tablet Take 80 mg by mouth at bedtime       carboxymethylcellulose PF (REFRESH LIQUIGEL) 1 % ophthalmic gel Place 1 drop into both eyes 2 times daily.       clopidogrel (PLAVIX) 75 MG tablet Take 75 mg by mouth daily       escitalopram (LEXAPRO) 20 MG tablet Take 1 tablet (20 mg) by mouth daily       gabapentin (NEURONTIN) 100 MG capsule Take 100 mg by mouth 2 times daily       isosorbide dinitrate (ISORDIL) 5 MG tablet Take 1 tablet (5 mg) by mouth 2 times daily       metFORMIN (GLUCOPHAGE) 500 MG tablet Take 500 mg by mouth daily (with breakfast)       nitroGLYcerin (NITROSTAT) 0.4 MG sublingual tablet Place 0.4 mg under the tongue every 5 minutes as needed for chest pain. For chest pain place 1 tablet under the tongue every 5 minutes for 3 doses. If symptoms persist 5 minutes after 1st dose call 911.       senna-docusate (SENOKOT-S/PERICOLACE) 8.6-50 MG tablet Take 1  tablet by mouth daily.       tiZANidine (ZANAFLEX) 2 MG capsule Take 2 mg by mouth daily         REVIEW OF SYSTEMS:  4 point ROS neg other than the symptoms noted above in the HPI.      PHYSICAL EXAM:  /67   Pulse 55   Temp 98.2  F (36.8  C)   Resp 18   Wt 88.5 kg (195 lb)   SpO2 98%   BMI 28.80 kg/m    Physical Exam  Vitals and nursing note reviewed.   Constitutional:       Comments: Sitting in wheelchair, alert and oriented to person, place and time being slightly vague.  Obvious tremor in right leg.   HENT:      Head: Normocephalic.      Right Ear: External ear normal.      Left Ear: External ear normal.      Mouth/Throat:      Mouth: Mucous membranes are moist.      Pharynx: Oropharynx is clear.   Eyes:      Comments: Both eyes appear to be symmetrical.  Sclera's clear.     Cardiovascular:      Rate and Rhythm: Normal rate and regular rhythm.   Pulmonary:      Effort: Pulmonary effort is normal.      Breath sounds: Normal breath sounds.   Abdominal:      General: Bowel sounds are normal.      Palpations: Abdomen is soft.   Musculoskeletal:      Cervical back: Normal range of motion.      Comments: Right leg is shaking at rest and then quiets when moving his arms around.    Left extremities affected by stroke.  Will take his right hand and stretch out his fingers and lift arm up for exercises.  Not able to move left arm above his head   Skin:     General: Skin is warm and dry.      Comments: Not wearing socks and looking at his lower legs he has many scars from open areas.  Near left ankle he has two areas that appear to be healing and scabbed.   Neurological:      Mental Status: He is alert and oriented to person, place, and time.      Comments: No facial drooping.  Feeding self with fork and stabbing food without issues with right hand.  No coughing as chewing.  Minor forgetfulness noted in visit   Psychiatric:         Behavior: Behavior normal.         Thought Content: Thought content normal.       Comments: Wants to have his 's license but no way could he operate a car.  Family brings him to appointments and events.    Have not heard about any behaviors yet.       ASSESSMENT / PLAN:  (I69.354) Hemiparesis affecting left side as late effect of cerebrovascular accident (CVA) (H)  (primary encounter diagnosis)  (Z86.73) History of CVA (cerebrovascular accident)  (I69.398,  R25.2) Spasticity as late effect of cerebrovascular accident (CVA)  Comment: remains on ASA and Plavix.  Talked about this for a while and risk of bleeding, stomach ulcers as he is not on a PPI.  No bleeding noted.  Nose had bled a little as evident with a smear of blood on a finger.  Otherwise no new symptoms.   Does take a statin at high dose.  Atorvastatin 80mg daily.  Will check a lipid panel next lab day  Will refer to Neurology for stroke and spasms of right leg.  This is daughter's request.  Last referral  and so she asked for help to get a new referral.  Plan: Adult Neurology  Referral      (E11.00) Type 2 diabetes mellitus with hyperosmolarity without coma, unspecified whether long term insulin use (H)  Comment: currently on Metformin only.  No sugar checks.  Following A1c and due for a lab.  Will order this and CBC, BMP next lab day.    (R63.5) Weight gain  Comment: weights are done twice/three times a week and staff asking if could be reduced.  Went back to the daughter to ask her this and she stated that he had gained a lot of weight like 40 lbs.  All recent weights stable 195 lbs  Will reduce to weekly weight with bath day.  Now eats 3 meals a day and not active for exercise to make a difference.  Dietary had cut down portions to 1.5 with meals vs the 2-3 portions he was receiving according to the daughter.     (K59.01) Slow transit constipation  Comment: last week took him off his senna-S as large loose stool.  Now talking today he states he may only go once a week.  Did have a vagus vagal response already  within the month?  Stressed to him that this NP did not want him to strain for his bowels.  Was on 2 tabs before but will restart him at 1 tab daily.    Plan: senna-docusate (SENOKOT-S/PERICOLACE) 8.6-50 MG        tablet     Orders:  Senna-S 1 tab po daily for constipation.    Printed out a Handicap plague form, filled out and given to daughter  Neurology consult dropped into [facility].  Next lab day, CBC, BMP A1c and lipid panel - type 2 DM and old CVA    Electronically signed by  ALEXANDRA Mejia CNP            Sincerely,        ALEXANDRA Mejia CNP    Electronically signed

## 2025-07-16 ENCOUNTER — PATIENT OUTREACH (OUTPATIENT)
Dept: CARE COORDINATION | Facility: CLINIC | Age: 75
End: 2025-07-16
Payer: COMMERCIAL

## 2025-07-17 LAB
ANION GAP SERPL CALCULATED.3IONS-SCNC: 13 MMOL/L (ref 7–15)
BASOPHILS # BLD AUTO: 0.1 10E3/UL (ref 0–0.2)
BASOPHILS NFR BLD AUTO: 1 %
BUN SERPL-MCNC: 20.2 MG/DL (ref 8–23)
CALCIUM SERPL-MCNC: 9.5 MG/DL (ref 8.8–10.4)
CHLORIDE SERPL-SCNC: 101 MMOL/L (ref 98–107)
CHOLEST SERPL-MCNC: 155 MG/DL
CREAT SERPL-MCNC: 1.17 MG/DL (ref 0.67–1.17)
EGFRCR SERPLBLD CKD-EPI 2021: 65 ML/MIN/1.73M2
EOSINOPHIL # BLD AUTO: 0.6 10E3/UL (ref 0–0.7)
EOSINOPHIL NFR BLD AUTO: 6 %
ERYTHROCYTE [DISTWIDTH] IN BLOOD BY AUTOMATED COUNT: 12.4 % (ref 10–15)
EST. AVERAGE GLUCOSE BLD GHB EST-MCNC: 177 MG/DL
FASTING STATUS PATIENT QL REPORTED: ABNORMAL
GLUCOSE SERPL-MCNC: 285 MG/DL (ref 70–99)
HBA1C MFR BLD: 7.8 %
HCO3 SERPL-SCNC: 22 MMOL/L (ref 22–29)
HCT VFR BLD AUTO: 37.8 % (ref 40–53)
HDLC SERPL-MCNC: 27 MG/DL
HGB BLD-MCNC: 12.1 G/DL (ref 13.3–17.7)
IMM GRANULOCYTES # BLD: 0 10E3/UL
IMM GRANULOCYTES NFR BLD: 0 %
LDLC SERPL CALC-MCNC: 53 MG/DL
LYMPHOCYTES # BLD AUTO: 1.7 10E3/UL (ref 0.8–5.3)
LYMPHOCYTES NFR BLD AUTO: 19 %
MCH RBC QN AUTO: 31.3 PG (ref 26.5–33)
MCHC RBC AUTO-ENTMCNC: 32 G/DL (ref 31.5–36.5)
MCV RBC AUTO: 98 FL (ref 78–100)
MONOCYTES # BLD AUTO: 0.9 10E3/UL (ref 0–1.3)
MONOCYTES NFR BLD AUTO: 10 %
NEUTROPHILS # BLD AUTO: 5.5 10E3/UL (ref 1.6–8.3)
NEUTROPHILS NFR BLD AUTO: 63 %
NONHDLC SERPL-MCNC: 128 MG/DL
NRBC # BLD AUTO: 0 10E3/UL
NRBC BLD AUTO-RTO: 0 /100
PLATELET # BLD AUTO: 288 10E3/UL (ref 150–450)
POTASSIUM SERPL-SCNC: 4.6 MMOL/L (ref 3.4–5.3)
RBC # BLD AUTO: 3.86 10E6/UL (ref 4.4–5.9)
SODIUM SERPL-SCNC: 136 MMOL/L (ref 135–145)
TRIGL SERPL-MCNC: 374 MG/DL
WBC # BLD AUTO: 8.7 10E3/UL (ref 4–11)

## 2025-07-17 PROCEDURE — 83036 HEMOGLOBIN GLYCOSYLATED A1C: CPT | Mod: ORL | Performed by: NURSE PRACTITIONER

## 2025-07-17 PROCEDURE — 36415 COLL VENOUS BLD VENIPUNCTURE: CPT | Mod: ORL | Performed by: NURSE PRACTITIONER

## 2025-07-17 PROCEDURE — 85025 COMPLETE CBC W/AUTO DIFF WBC: CPT | Mod: ORL | Performed by: NURSE PRACTITIONER

## 2025-07-17 PROCEDURE — 80048 BASIC METABOLIC PNL TOTAL CA: CPT | Mod: ORL | Performed by: NURSE PRACTITIONER

## 2025-07-17 PROCEDURE — P9604 ONE-WAY ALLOW PRORATED TRIP: HCPCS | Mod: ORL | Performed by: NURSE PRACTITIONER

## 2025-07-17 PROCEDURE — 80061 LIPID PANEL: CPT | Mod: ORL | Performed by: NURSE PRACTITIONER

## 2025-07-21 ENCOUNTER — NURSING HOME VISIT (OUTPATIENT)
Dept: GERIATRICS | Facility: CLINIC | Age: 75
End: 2025-07-21
Payer: COMMERCIAL

## 2025-07-21 VITALS
SYSTOLIC BLOOD PRESSURE: 120 MMHG | WEIGHT: 189 LBS | RESPIRATION RATE: 18 BRPM | TEMPERATURE: 97.2 F | BODY MASS INDEX: 27.91 KG/M2 | DIASTOLIC BLOOD PRESSURE: 69 MMHG | OXYGEN SATURATION: 98 % | HEART RATE: 51 BPM

## 2025-07-21 DIAGNOSIS — Z86.73 HISTORY OF CVA (CEREBROVASCULAR ACCIDENT): ICD-10-CM

## 2025-07-21 DIAGNOSIS — I69.354 HEMIPARESIS AFFECTING LEFT SIDE AS LATE EFFECT OF CEREBROVASCULAR ACCIDENT (CVA) (H): ICD-10-CM

## 2025-07-21 DIAGNOSIS — E11.00 TYPE 2 DIABETES MELLITUS WITH HYPEROSMOLARITY WITHOUT COMA, UNSPECIFIED WHETHER LONG TERM INSULIN USE (H): Primary | ICD-10-CM

## 2025-07-21 PROCEDURE — 99309 SBSQ NF CARE MODERATE MDM 30: CPT | Performed by: NURSE PRACTITIONER

## 2025-07-21 NOTE — LETTER
7/21/2025      Shaji Markham  [address]  [facility] [address]        No notes on file      Sincerely,        ALEXANDRA Mejia CNP    Electronically signed   2 times daily.       clopidogrel (PLAVIX) 75 MG tablet Take 75 mg by mouth daily       escitalopram (LEXAPRO) 20 MG tablet Take 1 tablet (20 mg) by mouth daily       gabapentin (NEURONTIN) 100 MG capsule Take 100 mg by mouth 2 times daily       isosorbide dinitrate (ISORDIL) 5 MG tablet Take 1 tablet (5 mg) by mouth 2 times daily       nitroGLYcerin (NITROSTAT) 0.4 MG sublingual tablet Place 0.4 mg under the tongue every 5 minutes as needed for chest pain. For chest pain place 1 tablet under the tongue every 5 minutes for 3 doses. If symptoms persist 5 minutes after 1st dose call 911.       senna-docusate (SENOKOT-S/PERICOLACE) 8.6-50 MG tablet Take 1 tablet by mouth daily.       tiZANidine (ZANAFLEX) 2 MG capsule Take 2 mg by mouth daily           REVIEW OF SYSTEMS:  4 point ROS neg other than the symptoms noted above in the HPI.      PHYSICAL EXAM:  /69   Pulse 51   Temp 97.2  F (36.2  C)   Resp 18   Wt 85.7 kg (189 lb)   SpO2 98%   BMI 27.91 kg/m    Alert and made eye contact while concentrating on the staff and what he had to do for them.    Skin pale, dry and warm.    No respiratory distress.  Heart rate regular and strong.  Abdomen round soft and non-tender.    Blood sugars typically not done.    Lab Results   Component Value Date    A1C 7.8 07/17/2025           ASSESSMENT / PLAN:  (E11.00) Type 2 diabetes mellitus with hyperosmolarity without coma, unspecified whether long term insulin use (H)  (primary encounter diagnosis)  Comment: A1c crept up and so will inch his metformin up and today go to 850mg twice a day.  Will have sugars checked BID x 30 days.  Will look at the values in a month.  Plan: metFORMIN (GLUCOPHAGE) 850 MG tablet    (I69.354) Hemiparesis affecting left side as late effect of cerebrovascular accident (CVA) (H)  (Z86.73) History of CVA (cerebrovascular accident)  Comment: depends on staff for safe tranfers, hygiene, bathing, dressing.  No concerns from nursing.        Orders:  Increase Metformin to 850mg po twice a day  Check blood sugars BID for 30 days and remind NP to look at them when done    Electronically signed by  ALEXANDRA Mejia CNP            Sincerely,        ALEXANDRA Mejia CNP    Electronically signed

## 2025-07-21 NOTE — PROGRESS NOTES
[facility] GERIATRICS  ACUTE/EPISODIC VISIT    [facility] Medical Record Number:  [id number]  Place of Service where encounter took place:  [facility] () [[id number]]    Chief Complaint   Patient presents with    RECHECK       HPI:    Shaji Markham is a 75 year old  (1950), who is being seen today for an episodic care visit.  HPI information obtained from: {FGS HPI:833110}.    Today's concern is:      ALLERGIES:    Allergies   Allergen Reactions    Latex Hives and Rash    Anesthetics, Lorna Rash    Wool Fiber Unknown        MEDICATIONS:  Post Discharge Medication Reconciliation Status: {ACO Med Rec (Provider):646322}. ***    Current Outpatient Medications   Medication Sig Dispense Refill    acetaminophen (TYLENOL) 325 MG tablet Take 650 mg by mouth 3 times daily      aspirin 81 MG EC tablet Take 81 mg by mouth daily      atorvastatin (LIPITOR) 80 MG tablet Take 80 mg by mouth at bedtime      carboxymethylcellulose PF (REFRESH LIQUIGEL) 1 % ophthalmic gel Place 1 drop into both eyes 2 times daily.      clopidogrel (PLAVIX) 75 MG tablet Take 75 mg by mouth daily      escitalopram (LEXAPRO) 20 MG tablet Take 1 tablet (20 mg) by mouth daily      gabapentin (NEURONTIN) 100 MG capsule Take 100 mg by mouth 2 times daily      isosorbide dinitrate (ISORDIL) 5 MG tablet Take 1 tablet (5 mg) by mouth 2 times daily      metFORMIN (GLUCOPHAGE) 500 MG tablet Take 500 mg by mouth daily (with breakfast)      nitroGLYcerin (NITROSTAT) 0.4 MG sublingual tablet Place 0.4 mg under the tongue every 5 minutes as needed for chest pain. For chest pain place 1 tablet under the tongue every 5 minutes for 3 doses. If symptoms persist 5 minutes after 1st dose call 911.      senna-docusate (SENOKOT-S/PERICOLACE) 8.6-50 MG tablet Take 1 tablet by mouth daily.      tiZANidine (ZANAFLEX) 2 MG capsule Take 2 mg by mouth daily       Medications reviewed:  Medications reconciled to facility chart and changes were made  to reflect current medications as identified as above med list. Below are the changes that were made:   Medications stopped since last EPIC medication reconciliation:   There are no discontinued medications.    Medications started since last [facility] medication reconciliation:  No orders of the defined types were placed in this encounter.    ***    REVIEW OF SYSTEMS:  4 point ROS neg other than the symptoms noted above in the HPI.***  Unable to be obtained due to cognitive impairment or aphasia.   ROS    PHYSICAL EXAM:  /69   Pulse 51   Temp 97.2  F (36.2  C)   Resp 18   Wt 85.7 kg (189 lb)   SpO2 98%   BMI 27.91 kg/m    Physical Exam      ASSESSMENT / PLAN:  {FGS DX:907089}    Orders:  ***    Electronically signed by  Laurel Philip         to look at them when done    Electronically signed by  ALEXANDRA Mejia CNP

## 2025-07-21 NOTE — PROGRESS NOTES
[facility] GERIATRIC SERVICES       Patient Shaji Markham  MRN: [id number]        Reason for Visit     Chief Complaint   Patient presents with    long term Regulatory       Code Status     DNR only    Assessment     vascular dementia with ongoing agitation  L carotid A stenosis s/p left carotid artery revascularization on 10/17/2024 [TCAR    Multivessel CAD-patient is felt to be a poor surgical candidate    Prior history of CVA post angiogram-right posterior cerebral artery  Etiology felt to be periprocedural  Has left sided hemiparesis.  Hyperlipidemia continue with his statins    Hypertension complicated by low blood pressures    Diabetes type 2  CKD 3b-  Prior history of tobacco use currently abstaining  Mood disorder/adjustment reaction    Chronic dysphagia   diabetic neuropathy   Anemia-last Hg stable at 8.2    Generalized weakness    Patient is a resident of [facility] seen today for follow-up visit  At baseline has left-sided weakness and hemiparesis   Has not been able to progress to independence in spite of aggressive therapy  At baseline due to decline he has become wheelchair-bound he has a history of recurrent falls  Unfortunately remains bedbound and wheelchair-bound at baseline.  Moved to [facility] due to increased confusion /wandering risk and adjusting well  Mood has overall been stable  However now reporting some sexually inappropriate behaviors however they appear to be triggered by another resident.  Will monitor them and consider medication if needed  Blood pressures are stable without medication  Monitor weight.  Has been gaining weight and his portion size has been cut down.  Diabetes control noted to be suboptimal with a rise in A1c due to weight gain at 7.8.  Now on a higher dose of metformin  Recheck his routine labs including an A1c as ordered.  Continue with his care plan      History     Patient is a very pleasant 75 year old male who is a resident of long-term  care  Patient was initially admitted to the hospital with multivessel CAD  He has a history of CVA post angiogram with resultant left-sided weakness  [place] to be a poor surgical candidate and discharged on medical management  Course complicated by hypotension  Was in the TCU and now moved to long-term care  He was readmitted to the hospital with history of bilateral carotid artery stenosis.  He underwent TCAR on 10/17/2024  Postoperatively had some encephalopathy concerns which have resolved.  At baseline remains a poor historian  Also has s vascular dementia with some behavioral concerns   He was on quetiapine at one time which was discontinued.  However now staff are reporting some inappropriate mainly sexual behaviors.  It seems to be triggered by another resident so we will monitor them.  Has gained weight and A1c has gone from 7.1-7.8 due to weight gain    Past Medical & Surgical History   Diabetes  Multivessel CAD  CKD      Past Social History     Reviewed,  has a past history of smoking denies alcohol use    Family History     Reviewed, and family history is not on file.    Medication List     Current Outpatient Medications   Medication Sig Dispense Refill    acetaminophen (TYLENOL) 325 MG tablet Take 650 mg by mouth 3 times daily      aspirin 81 MG EC tablet Take 81 mg by mouth daily      atorvastatin (LIPITOR) 80 MG tablet Take 80 mg by mouth at bedtime      carboxymethylcellulose PF (REFRESH LIQUIGEL) 1 % ophthalmic gel Place 1 drop into both eyes 2 times daily.      clopidogrel (PLAVIX) 75 MG tablet Take 75 mg by mouth daily      escitalopram (LEXAPRO) 20 MG tablet Take 1 tablet (20 mg) by mouth daily      gabapentin (NEURONTIN) 100 MG capsule Take 100 mg by mouth 2 times daily      isosorbide dinitrate (ISORDIL) 5 MG tablet Take 1 tablet (5 mg) by mouth 2 times daily      metFORMIN (GLUCOPHAGE) 500 MG tablet Take 500 mg by mouth daily (with breakfast)      nitroGLYcerin (NITROSTAT) 0.4 MG sublingual  "tablet Place 0.4 mg under the tongue every 5 minutes as needed for chest pain. For chest pain place 1 tablet under the tongue every 5 minutes for 3 doses. If symptoms persist 5 minutes after 1st dose call 911.      senna-docusate (SENOKOT-S/PERICOLACE) 8.6-50 MG tablet Take 1 tablet by mouth daily.      tiZANidine (ZANAFLEX) 2 MG capsule Take 2 mg by mouth daily       No current facility-administered medications for this visit.      MED REC REQUIRED  Post Medication Reconciliation Status: discharge medications reconciled, continue medications without change       Allergies     Allergies   Allergen Reactions    Latex Hives and Rash    Anesthetics, Lorna Rash    Wool Fiber Unknown       Review of Systems   A comprehensive review of 14 systems was   Not done due to vascular dementia    Physical Exam   /70   Pulse 76   Temp 97.2  F (36.2  C)   Resp 18   Ht 1.753 m (5' 9\")   Wt 82.6 kg (182 lb)   SpO2 98%   BMI 26.88 kg/m       GENERAL: no acute distress. Cooperative in conversation.   HEENT: pupils are equal, round and reactive. Oral mucosa is moist and intact.  RESP:Chest symmetric. Regular respiratory rate. No stridor.  CVS S1-S2  ABD: Nondistended, soft.  EXTREMITIES: No lower extremity edema.   NEURO: non focal. Alert and oriented x self.   PSYCH: within normal limits. No depression or anxiety.  Has had some inappropriate behaviors  SKIN: warm dry intact      Lab Results     Last Comprehensive Metabolic Panel:  Lab Results   Component Value Date     07/17/2025    POTASSIUM 4.6 07/17/2025    CHLORIDE 101 07/17/2025    CO2 22 07/17/2025    ANIONGAP 13 07/17/2025     (H) 07/17/2025    BUN 20.2 07/17/2025    CR 1.17 07/17/2025    GFRESTIMATED 65 07/17/2025    MARAL 9.5 07/17/2025           Electronically signed by    Desiree Katz MD                        "

## 2025-07-24 ENCOUNTER — NURSING HOME VISIT (OUTPATIENT)
Dept: GERIATRICS | Facility: CLINIC | Age: 75
End: 2025-07-24
Payer: COMMERCIAL

## 2025-07-24 VITALS
TEMPERATURE: 97.2 F | DIASTOLIC BLOOD PRESSURE: 70 MMHG | WEIGHT: 182 LBS | SYSTOLIC BLOOD PRESSURE: 128 MMHG | HEART RATE: 76 BPM | RESPIRATION RATE: 18 BRPM | OXYGEN SATURATION: 98 % | HEIGHT: 69 IN | BODY MASS INDEX: 26.96 KG/M2

## 2025-07-24 DIAGNOSIS — I69.354 HEMIPARESIS AFFECTING LEFT SIDE AS LATE EFFECT OF CEREBROVASCULAR ACCIDENT (CVA) (H): ICD-10-CM

## 2025-07-24 DIAGNOSIS — I69.398 SPASTICITY AS LATE EFFECT OF CEREBROVASCULAR ACCIDENT (CVA): ICD-10-CM

## 2025-07-24 DIAGNOSIS — N18.32 STAGE 3B CHRONIC KIDNEY DISEASE (H): Primary | ICD-10-CM

## 2025-07-24 DIAGNOSIS — F34.1 PERSISTENT DEPRESSIVE DISORDER WITH ANXIOUS DISTRESS, CURRENTLY SEVERE: ICD-10-CM

## 2025-07-24 DIAGNOSIS — R13.10 DYSPHAGIA, UNSPECIFIED TYPE: ICD-10-CM

## 2025-07-24 DIAGNOSIS — Z98.890 STATUS POST CAROTID ENDARTERECTOMY: ICD-10-CM

## 2025-07-24 DIAGNOSIS — R63.5 WEIGHT GAIN: ICD-10-CM

## 2025-07-24 DIAGNOSIS — I25.118 CORONARY ARTERY DISEASE OF NATIVE ARTERY OF NATIVE HEART WITH STABLE ANGINA PECTORIS: ICD-10-CM

## 2025-07-24 DIAGNOSIS — Z86.73 HISTORY OF CVA (CEREBROVASCULAR ACCIDENT): ICD-10-CM

## 2025-07-24 DIAGNOSIS — R25.2 SPASTICITY AS LATE EFFECT OF CEREBROVASCULAR ACCIDENT (CVA): ICD-10-CM

## 2025-07-24 DIAGNOSIS — E11.00 TYPE 2 DIABETES MELLITUS WITH HYPEROSMOLARITY WITHOUT COMA, UNSPECIFIED WHETHER LONG TERM INSULIN USE (H): ICD-10-CM

## 2025-07-24 NOTE — LETTER
7/24/2025      Shaji GOLDBERG Shahrzad  [address] [place] [address]        [facility]       Patient Shaji Markham  MRN: [id number]        Reason for Visit     Chief Complaint   Patient presents with     detention Regulatory       Code Status     DNR only    Assessment     vascular dementia with ongoing agitation  L carotid A stenosis s/p left carotid artery revascularization on 10/17/2024 [TCAR    Multivessel CAD-patient is felt to be a poor surgical candidate    Prior history of CVA post angiogram-right posterior cerebral artery  Etiology felt to be periprocedural  Has left sided hemiparesis.  Hyperlipidemia continue with his statins    Hypertension complicated by low blood pressures    Diabetes type 2  CKD 3b-  Prior history of tobacco use currently abstaining  Mood disorder/adjustment reaction    Chronic dysphagia   diabetic neuropathy   Anemia-last Hg stable at 8.2    Generalized weakness    Patient is a resident of long-term care seen today for follow-up visit  At baseline has left-sided weakness and hemiparesis   Has not been able to progress to independence in spite of aggressive therapy  At baseline due to decline he has become wheelchair-bound he has a history of recurrent falls  Unfortunately remains bedbound and wheelchair-bound at baseline.  Moved to [facility] due to increased confusion /wandering risk and adjusting well  Mood has overall been stable  However now reporting some sexually inappropriate behaviors however they appear to be triggered by another resident.  Will monitor them and consider medication if needed  Blood pressures are stable without medication  Monitor weight.  Has been gaining weight and his portion size has been cut down.  Diabetes control noted to be suboptimal with a rise in A1c due to weight gain at 7.8.  Now on a higher dose of metformin  Recheck his routine labs including an A1c as ordered.  Continue with his care plan      History     Patient is  a very pleasant 75 year old male who is a resident of long-term care  Patient was initially admitted to the hospital with multivessel CAD  He has a history of CVA post angiogram with resultant left-sided weakness  [place] to be a poor surgical candidate and discharged on medical management  Course complicated by hypotension  Was in the TCU and now moved to long-term care  He was readmitted to the hospital with history of bilateral carotid artery stenosis.  He underwent TCAR on 10/17/2024  Postoperatively had some encephalopathy concerns which have resolved.  At baseline remains a poor historian  Also has s vascular dementia with some behavioral concerns   He was on quetiapine at one time which was discontinued.  However now staff are reporting some inappropriate mainly sexual behaviors.  It seems to be triggered by another resident so we will monitor them.  Has gained weight and A1c has gone from 7.1-7.8 due to weight gain    Past Medical & Surgical History   Diabetes  Multivessel CAD  CKD      Past Social History     Reviewed,  has a past history of smoking denies alcohol use    Family History     Reviewed, and family history is not on file.    Medication List     Current Outpatient Medications   Medication Sig Dispense Refill     acetaminophen (TYLENOL) 325 MG tablet Take 650 mg by mouth 3 times daily       aspirin 81 MG EC tablet Take 81 mg by mouth daily       atorvastatin (LIPITOR) 80 MG tablet Take 80 mg by mouth at bedtime       carboxymethylcellulose PF (REFRESH LIQUIGEL) 1 % ophthalmic gel Place 1 drop into both eyes 2 times daily.       clopidogrel (PLAVIX) 75 MG tablet Take 75 mg by mouth daily       escitalopram (LEXAPRO) 20 MG tablet Take 1 tablet (20 mg) by mouth daily       gabapentin (NEURONTIN) 100 MG capsule Take 100 mg by mouth 2 times daily       isosorbide dinitrate (ISORDIL) 5 MG tablet Take 1 tablet (5 mg) by mouth 2 times daily       metFORMIN (GLUCOPHAGE) 500 MG tablet Take 500 mg by mouth  "daily (with breakfast)       nitroGLYcerin (NITROSTAT) 0.4 MG sublingual tablet Place 0.4 mg under the tongue every 5 minutes as needed for chest pain. For chest pain place 1 tablet under the tongue every 5 minutes for 3 doses. If symptoms persist 5 minutes after 1st dose call 911.       senna-docusate (SENOKOT-S/PERICOLACE) 8.6-50 MG tablet Take 1 tablet by mouth daily.       tiZANidine (ZANAFLEX) 2 MG capsule Take 2 mg by mouth daily       No current facility-administered medications for this visit.      MED REC REQUIRED  Post Medication Reconciliation Status: discharge medications reconciled, continue medications without change       Allergies     Allergies   Allergen Reactions     Latex Hives and Rash     Anesthetics, Lorna Rash     Wool Fiber Unknown       Review of Systems   A comprehensive review of 14 systems was   Not done due to vascular dementia    Physical Exam   /70   Pulse 76   Temp 97.2  F (36.2  C)   Resp 18   Ht 1.753 m (5' 9\")   Wt 82.6 kg (182 lb)   SpO2 98%   BMI 26.88 kg/m       GENERAL: no acute distress. Cooperative in conversation.   HEENT: pupils are equal, round and reactive. Oral mucosa is moist and intact.  RESP:Chest symmetric. Regular respiratory rate. No stridor.  CVS S1-S2  ABD: Nondistended, soft.  EXTREMITIES: No lower extremity edema.   NEURO: non focal. Alert and oriented x self.   PSYCH: within normal limits. No depression or anxiety.  Has had some inappropriate behaviors  SKIN: warm dry intact      Lab Results     Last Comprehensive Metabolic Panel:  Lab Results   Component Value Date     07/17/2025    POTASSIUM 4.6 07/17/2025    CHLORIDE 101 07/17/2025    CO2 22 07/17/2025    ANIONGAP 13 07/17/2025     (H) 07/17/2025    BUN 20.2 07/17/2025    CR 1.17 07/17/2025    GFRESTIMATED 65 07/17/2025    MARAL 9.5 07/17/2025           Electronically signed by    Desiree Katz MD                            Sincerely,        GAURI Fitzgerald    Electronically signed  "

## 2025-08-12 ENCOUNTER — TELEPHONE (OUTPATIENT)
Dept: GERIATRICS | Facility: CLINIC | Age: 75
End: 2025-08-12
Payer: COMMERCIAL

## 2025-08-12 DIAGNOSIS — E11.42 DIABETIC POLYNEUROPATHY ASSOCIATED WITH TYPE 2 DIABETES MELLITUS (H): Primary | ICD-10-CM

## 2025-08-12 RX ORDER — GABAPENTIN 100 MG/1
200 CAPSULE ORAL 2 TIMES DAILY
Qty: 120 CAPSULE | Refills: 11 | Status: SHIPPED | OUTPATIENT
Start: 2025-08-12